# Patient Record
Sex: MALE | Race: WHITE | NOT HISPANIC OR LATINO | URBAN - METROPOLITAN AREA
[De-identification: names, ages, dates, MRNs, and addresses within clinical notes are randomized per-mention and may not be internally consistent; named-entity substitution may affect disease eponyms.]

---

## 2017-11-22 ENCOUNTER — INPATIENT (INPATIENT)
Facility: HOSPITAL | Age: 67
LOS: 5 days | Discharge: HOME | DRG: 683 | End: 2017-11-28
Attending: INTERNAL MEDICINE | Admitting: INTERNAL MEDICINE
Payer: MEDICARE

## 2017-11-22 VITALS
TEMPERATURE: 98 F | OXYGEN SATURATION: 99 % | DIASTOLIC BLOOD PRESSURE: 77 MMHG | RESPIRATION RATE: 18 BRPM | SYSTOLIC BLOOD PRESSURE: 113 MMHG | HEIGHT: 69 IN | HEART RATE: 76 BPM | WEIGHT: 149.91 LBS

## 2017-11-22 DIAGNOSIS — N28.9 DISORDER OF KIDNEY AND URETER, UNSPECIFIED: ICD-10-CM

## 2017-11-22 DIAGNOSIS — Z90.79 ACQUIRED ABSENCE OF OTHER GENITAL ORGAN(S): Chronic | ICD-10-CM

## 2017-11-22 LAB
ALBUMIN SERPL ELPH-MCNC: 3.4 G/DL — SIGNIFICANT CHANGE UP (ref 3.3–5.2)
ALP SERPL-CCNC: 86 U/L — SIGNIFICANT CHANGE UP (ref 40–120)
ALT FLD-CCNC: 18 U/L — SIGNIFICANT CHANGE UP
ANION GAP SERPL CALC-SCNC: 14 MMOL/L — SIGNIFICANT CHANGE UP (ref 5–17)
APPEARANCE UR: CLEAR — SIGNIFICANT CHANGE UP
APTT BLD: 27.6 SEC — SIGNIFICANT CHANGE UP (ref 27.5–37.4)
AST SERPL-CCNC: 14 U/L — SIGNIFICANT CHANGE UP
BACTERIA # UR AUTO: ABNORMAL
BASOPHILS # BLD AUTO: 0 K/UL — SIGNIFICANT CHANGE UP (ref 0–0.2)
BASOPHILS NFR BLD AUTO: 0.3 % — SIGNIFICANT CHANGE UP (ref 0–2)
BILIRUB SERPL-MCNC: 0.5 MG/DL — SIGNIFICANT CHANGE UP (ref 0.4–2)
BILIRUB UR-MCNC: NEGATIVE — SIGNIFICANT CHANGE UP
BUN SERPL-MCNC: 46 MG/DL — HIGH (ref 8–20)
CALCIUM SERPL-MCNC: 10.4 MG/DL — HIGH (ref 8.6–10.2)
CHLORIDE SERPL-SCNC: 113 MMOL/L — HIGH (ref 98–107)
CO2 SERPL-SCNC: 23 MMOL/L — SIGNIFICANT CHANGE UP (ref 22–29)
COLOR SPEC: YELLOW — SIGNIFICANT CHANGE UP
CREAT SERPL-MCNC: 2.8 MG/DL — HIGH (ref 0.5–1.3)
DIFF PNL FLD: ABNORMAL
EOSINOPHIL # BLD AUTO: 0.3 K/UL — SIGNIFICANT CHANGE UP (ref 0–0.5)
EOSINOPHIL NFR BLD AUTO: 4.9 % — SIGNIFICANT CHANGE UP (ref 0–5)
EPI CELLS # UR: SIGNIFICANT CHANGE UP
GLUCOSE SERPL-MCNC: 142 MG/DL — HIGH (ref 70–115)
GLUCOSE UR QL: 100 MG/DL
HCT VFR BLD CALC: 34.8 % — LOW (ref 42–52)
HGB BLD-MCNC: 11.3 G/DL — LOW (ref 14–18)
INR BLD: 1.09 RATIO — SIGNIFICANT CHANGE UP (ref 0.88–1.16)
KETONES UR-MCNC: NEGATIVE — SIGNIFICANT CHANGE UP
LEUKOCYTE ESTERASE UR-ACNC: ABNORMAL
LYMPHOCYTES # BLD AUTO: 1.4 K/UL — SIGNIFICANT CHANGE UP (ref 1–4.8)
LYMPHOCYTES # BLD AUTO: 20.4 % — SIGNIFICANT CHANGE UP (ref 20–55)
MAGNESIUM SERPL-MCNC: 2.3 MG/DL — SIGNIFICANT CHANGE UP (ref 1.6–2.6)
MCHC RBC-ENTMCNC: 30.8 PG — SIGNIFICANT CHANGE UP (ref 27–31)
MCHC RBC-ENTMCNC: 32.5 G/DL — SIGNIFICANT CHANGE UP (ref 32–36)
MCV RBC AUTO: 94.8 FL — HIGH (ref 80–94)
MONOCYTES # BLD AUTO: 0.5 K/UL — SIGNIFICANT CHANGE UP (ref 0–0.8)
MONOCYTES NFR BLD AUTO: 8 % — SIGNIFICANT CHANGE UP (ref 3–10)
NEUTROPHILS # BLD AUTO: 4.5 K/UL — SIGNIFICANT CHANGE UP (ref 1.8–8)
NEUTROPHILS NFR BLD AUTO: 65.8 % — SIGNIFICANT CHANGE UP (ref 37–73)
NITRITE UR-MCNC: POSITIVE
PH UR: 7 — SIGNIFICANT CHANGE UP (ref 5–8)
PHOSPHATE SERPL-MCNC: 3.4 MG/DL — SIGNIFICANT CHANGE UP (ref 2.4–4.7)
PLATELET # BLD AUTO: 135 K/UL — LOW (ref 150–400)
POTASSIUM SERPL-MCNC: 4.2 MMOL/L — SIGNIFICANT CHANGE UP (ref 3.5–5.3)
POTASSIUM SERPL-SCNC: 4.2 MMOL/L — SIGNIFICANT CHANGE UP (ref 3.5–5.3)
PROT SERPL-MCNC: 6 G/DL — LOW (ref 6.6–8.7)
PROT UR-MCNC: 30 MG/DL
PROTHROM AB SERPL-ACNC: 12 SEC — SIGNIFICANT CHANGE UP (ref 9.8–12.7)
RBC # BLD: 3.67 M/UL — LOW (ref 4.6–6.2)
RBC # FLD: 13.2 % — SIGNIFICANT CHANGE UP (ref 11–15.6)
RBC CASTS # UR COMP ASSIST: SIGNIFICANT CHANGE UP /HPF (ref 0–4)
SODIUM SERPL-SCNC: 150 MMOL/L — HIGH (ref 135–145)
SP GR SPEC: 1.01 — SIGNIFICANT CHANGE UP (ref 1.01–1.02)
UROBILINOGEN FLD QL: NEGATIVE MG/DL — SIGNIFICANT CHANGE UP
WBC # BLD: 6.8 K/UL — SIGNIFICANT CHANGE UP (ref 4.8–10.8)
WBC # FLD AUTO: 6.8 K/UL — SIGNIFICANT CHANGE UP (ref 4.8–10.8)
WBC UR QL: ABNORMAL

## 2017-11-22 PROCEDURE — 93010 ELECTROCARDIOGRAM REPORT: CPT

## 2017-11-22 PROCEDURE — 76775 US EXAM ABDO BACK WALL LIM: CPT | Mod: 26

## 2017-11-22 PROCEDURE — 99223 1ST HOSP IP/OBS HIGH 75: CPT

## 2017-11-22 PROCEDURE — 99285 EMERGENCY DEPT VISIT HI MDM: CPT

## 2017-11-22 RX ORDER — DEXTROSE 50 % IN WATER 50 %
25 SYRINGE (ML) INTRAVENOUS ONCE
Qty: 0 | Refills: 0 | Status: DISCONTINUED | OUTPATIENT
Start: 2017-11-22 | End: 2017-11-28

## 2017-11-22 RX ORDER — SODIUM CHLORIDE 9 MG/ML
1000 INJECTION INTRAMUSCULAR; INTRAVENOUS; SUBCUTANEOUS
Qty: 0 | Refills: 0 | Status: DISCONTINUED | OUTPATIENT
Start: 2017-11-22 | End: 2017-11-22

## 2017-11-22 RX ORDER — PANTOPRAZOLE SODIUM 20 MG/1
1 TABLET, DELAYED RELEASE ORAL
Qty: 0 | Refills: 0 | COMMUNITY

## 2017-11-22 RX ORDER — HEPARIN SODIUM 5000 [USP'U]/ML
5000 INJECTION INTRAVENOUS; SUBCUTANEOUS EVERY 8 HOURS
Qty: 0 | Refills: 0 | Status: DISCONTINUED | OUTPATIENT
Start: 2017-11-22 | End: 2017-11-28

## 2017-11-22 RX ORDER — QUETIAPINE FUMARATE 200 MG/1
25 TABLET, FILM COATED ORAL
Qty: 0 | Refills: 0 | Status: DISCONTINUED | OUTPATIENT
Start: 2017-11-22 | End: 2017-11-28

## 2017-11-22 RX ORDER — SODIUM CHLORIDE 9 MG/ML
1000 INJECTION, SOLUTION INTRAVENOUS
Qty: 0 | Refills: 0 | Status: DISCONTINUED | OUTPATIENT
Start: 2017-11-22 | End: 2017-11-28

## 2017-11-22 RX ORDER — QUETIAPINE FUMARATE 200 MG/1
100 TABLET, FILM COATED ORAL AT BEDTIME
Qty: 0 | Refills: 0 | Status: DISCONTINUED | OUTPATIENT
Start: 2017-11-22 | End: 2017-11-28

## 2017-11-22 RX ORDER — INSULIN LISPRO 100/ML
VIAL (ML) SUBCUTANEOUS
Qty: 0 | Refills: 0 | Status: DISCONTINUED | OUTPATIENT
Start: 2017-11-22 | End: 2017-11-28

## 2017-11-22 RX ORDER — SUCRALFATE 1 G
1 TABLET ORAL
Qty: 0 | Refills: 0 | Status: DISCONTINUED | OUTPATIENT
Start: 2017-11-22 | End: 2017-11-28

## 2017-11-22 RX ORDER — CINACALCET 30 MG/1
30 TABLET, FILM COATED ORAL DAILY
Qty: 0 | Refills: 0 | Status: DISCONTINUED | OUTPATIENT
Start: 2017-11-22 | End: 2017-11-28

## 2017-11-22 RX ORDER — PROPRANOLOL HCL 160 MG
20 CAPSULE, EXTENDED RELEASE 24HR ORAL
Qty: 0 | Refills: 0 | Status: DISCONTINUED | OUTPATIENT
Start: 2017-11-22 | End: 2017-11-28

## 2017-11-22 RX ORDER — LEVOTHYROXINE SODIUM 125 MCG
75 TABLET ORAL DAILY
Qty: 0 | Refills: 0 | Status: DISCONTINUED | OUTPATIENT
Start: 2017-11-22 | End: 2017-11-28

## 2017-11-22 RX ORDER — ATORVASTATIN CALCIUM 80 MG/1
1 TABLET, FILM COATED ORAL
Qty: 0 | Refills: 0 | COMMUNITY

## 2017-11-22 RX ORDER — DEXTROSE 50 % IN WATER 50 %
1 SYRINGE (ML) INTRAVENOUS ONCE
Qty: 0 | Refills: 0 | Status: DISCONTINUED | OUTPATIENT
Start: 2017-11-22 | End: 2017-11-28

## 2017-11-22 RX ORDER — GLUCAGON INJECTION, SOLUTION 0.5 MG/.1ML
1 INJECTION, SOLUTION SUBCUTANEOUS ONCE
Qty: 0 | Refills: 0 | Status: DISCONTINUED | OUTPATIENT
Start: 2017-11-22 | End: 2017-11-28

## 2017-11-22 RX ORDER — LEVOTHYROXINE SODIUM 125 MCG
1 TABLET ORAL
Qty: 0 | Refills: 0 | COMMUNITY

## 2017-11-22 RX ORDER — TRAZODONE HCL 50 MG
100 TABLET ORAL AT BEDTIME
Qty: 0 | Refills: 0 | Status: DISCONTINUED | OUTPATIENT
Start: 2017-11-22 | End: 2017-11-28

## 2017-11-22 RX ORDER — TAMSULOSIN HYDROCHLORIDE 0.4 MG/1
1 CAPSULE ORAL
Qty: 0 | Refills: 0 | COMMUNITY

## 2017-11-22 RX ORDER — ATORVASTATIN CALCIUM 80 MG/1
40 TABLET, FILM COATED ORAL AT BEDTIME
Qty: 0 | Refills: 0 | Status: DISCONTINUED | OUTPATIENT
Start: 2017-11-22 | End: 2017-11-28

## 2017-11-22 RX ORDER — SODIUM CHLORIDE 9 MG/ML
1000 INJECTION, SOLUTION INTRAVENOUS
Qty: 0 | Refills: 0 | Status: DISCONTINUED | OUTPATIENT
Start: 2017-11-22 | End: 2017-11-24

## 2017-11-22 RX ORDER — INSULIN GLARGINE 100 [IU]/ML
22 INJECTION, SOLUTION SUBCUTANEOUS AT BEDTIME
Qty: 0 | Refills: 0 | Status: DISCONTINUED | OUTPATIENT
Start: 2017-11-22 | End: 2017-11-28

## 2017-11-22 RX ORDER — PANTOPRAZOLE SODIUM 20 MG/1
40 TABLET, DELAYED RELEASE ORAL
Qty: 0 | Refills: 0 | Status: DISCONTINUED | OUTPATIENT
Start: 2017-11-22 | End: 2017-11-28

## 2017-11-22 RX ORDER — INSULIN GLARGINE 100 [IU]/ML
22 INJECTION, SOLUTION SUBCUTANEOUS
Qty: 0 | Refills: 0 | COMMUNITY

## 2017-11-22 RX ORDER — SUCRALFATE 1 G
1 TABLET ORAL
Qty: 0 | Refills: 0 | COMMUNITY

## 2017-11-22 RX ORDER — QUETIAPINE FUMARATE 200 MG/1
1 TABLET, FILM COATED ORAL
Qty: 0 | Refills: 0 | COMMUNITY

## 2017-11-22 RX ORDER — BENZTROPINE MESYLATE 1 MG
1 TABLET ORAL
Qty: 0 | Refills: 0 | Status: DISCONTINUED | OUTPATIENT
Start: 2017-11-22 | End: 2017-11-28

## 2017-11-22 RX ORDER — TAMSULOSIN HYDROCHLORIDE 0.4 MG/1
0.4 CAPSULE ORAL AT BEDTIME
Qty: 0 | Refills: 0 | Status: DISCONTINUED | OUTPATIENT
Start: 2017-11-22 | End: 2017-11-28

## 2017-11-22 RX ORDER — DEXTROSE 50 % IN WATER 50 %
12.5 SYRINGE (ML) INTRAVENOUS ONCE
Qty: 0 | Refills: 0 | Status: DISCONTINUED | OUTPATIENT
Start: 2017-11-22 | End: 2017-11-28

## 2017-11-22 RX ADMIN — Medication 75 MICROGRAM(S): at 22:10

## 2017-11-22 RX ADMIN — SODIUM CHLORIDE 85 MILLILITER(S): 9 INJECTION INTRAMUSCULAR; INTRAVENOUS; SUBCUTANEOUS at 15:02

## 2017-11-22 RX ADMIN — Medication 100 MILLIGRAM(S): at 22:10

## 2017-11-22 RX ADMIN — Medication 20 MILLIGRAM(S): at 22:10

## 2017-11-22 RX ADMIN — Medication 1 MILLIGRAM(S): at 22:10

## 2017-11-22 RX ADMIN — TAMSULOSIN HYDROCHLORIDE 0.4 MILLIGRAM(S): 0.4 CAPSULE ORAL at 22:10

## 2017-11-22 RX ADMIN — Medication 0.5 MILLIGRAM(S): at 23:01

## 2017-11-22 RX ADMIN — Medication 5 MILLIGRAM(S): at 22:10

## 2017-11-22 NOTE — ED PROVIDER NOTE - MUSCULOSKELETAL NEGATIVE STATEMENT, MLM
Unasyn, Vancomycin, Peridex, Versed no back pain, no gout, no musculoskeletal pain, no neck pain, and no weakness.

## 2017-11-22 NOTE — H&P ADULT - NSHPPHYSICALEXAM_GEN_ALL_CORE
ICU Vital Signs Last 24 Hrs  T(C): 36.6 (22 Nov 2017 12:06), Max: 36.8 (22 Nov 2017 11:09)  T(F): 97.9 (22 Nov 2017 12:06), Max: 98.2 (22 Nov 2017 11:09)  HR: 55 (22 Nov 2017 12:06) (55 - 76)  BP: 110/60 (22 Nov 2017 12:06) (110/60 - 113/77)  BP(mean): --  ABP: --  ABP(mean): --  RR: 16 (22 Nov 2017 12:06) (16 - 18)  SpO2: 95% (22 Nov 2017 12:06) (95% - 99%)

## 2017-11-22 NOTE — ED ADULT NURSE NOTE - OBJECTIVE STATEMENT
Patient poor historian. Patient daughter Naz at bedside [phone]. Daughter reports patient was hospitalized in [place] in september for dehydration, on lithium for bipolar causing renal impairment. Sent to Rehab in [place]. Medically flown back to [place]. follow up care at [facility] where they changed his psych meds to not metabolize in kidneys. Today she received call from [facility] for abnormal kidney function labs. patient has 16f gama catheter to leg bag in place.

## 2017-11-22 NOTE — ED PROVIDER NOTE - OBJECTIVE STATEMENT
67 year old male BIBA FROM A NURSING HOME WITH ABDNOMAL LABS.. Pt had recent blod tests and was told that his creatinine levels were going up. pt has a h/o kidney disease and has a chronic indwelling gama catheter . pt also has a h/o hyperparathyroidism and has elevated calcium levels 67 year old male BIBA FROM A NURSING HOME WITH ABDNOMAL LABS.. Pt had recent blood tests and was told that his creatinine levels were going up. pt has a h/o kidney disease and has a chronic indwelling gama catheter . pt also has a h/o hyperparathyroidism and has elevated calcium levels

## 2017-11-22 NOTE — H&P ADULT - ASSESSMENT
This is a 66 yo  man  with PMH of DM, HTN, psychiatric disease on Lithium for "years", prostate disease s/p surgery, chronic indwelling gama for "nonfunctioning bladder", parathyroid disease s/p resection and CRF who was following a nephrologist in [place] but was moved to [facility] in the recent past, admitted for worsening renal function. Pt report diarrhea coupe of days ago, loose stool, resolved now, also report poor oral intake. Labs here showed BUN 46, Cr2.80, Sodium 150, calcium 10.4    A/P    >Acute on CKD - unknown baseline creatinine   likely due to dehydration   admit to  medicine    start IVF, renal u/s  renal consult noted   f/u BMP  u/a     >Hypernatremia due to dehydration   c/w 0.45, f/u BMP    >Hypercalcemia - h/o parathyroid disease  c/w sensipar   f/u PTH  c/w IVF, f/u calcium     >GERD - PPI     >HTN - normotensive   will hold amlodipine for now   c/w propranolol     >Bipolar disorder and other psych comorbidities- c/w home medication     >DM - c/w lantus and sliding scale   A1c in am     >dyslipidemia - c/w statin     >DVT Ppx- heparin

## 2017-11-22 NOTE — CONSULT NOTE ADULT - ASSESSMENT
CRF(III): DM and hx Li use most likely etiologies  +chronic gama for (?) bladder dysfunction with no signs of obstruction on outpatient sono done 11/21/17.  ARF etiology unclear at this time==> r/o hypovolemia  Hypercalcemia with hx of parathyroid disease  - await admission bloodwork and repeat renal sonogram  - confirm and maintain patency of gama catheter  - IVF hydration  - check iPTH  - monitor labs

## 2017-11-22 NOTE — H&P ADULT - GASTROINTESTINAL DETAILS
Patient Demographics     Address Phone E-mail Address    Nereyda Senior  [phone] (Home)  [phone] (Mobile) *Preferred* [email or web address]      Emergency Contact(s)     Name Relation Home Work Mobile    Ty Krishnamurthy Spouse [phone] Commonly known as:  NORCO        Take 1 tablet by mouth every 4 (four) hours as needed.    Stop taking on:  3/16/2017    Denise Meneses                           Losartan Potassium 25 MG Tabs   Last time this was given:  25 mg on 3/2/2017  9:30 AM   Commonly [id number] HYDROcodone-acetaminophen (NORCO) 5-325 MG per tab 1 tablet 03/06/17 0540 Given      [id number] HYDROcodone-acetaminophen (NORCO) 5-325 MG per tab 1 tablet 03/06/17 1341 Given      [id number] STRESS FORMULA/ZINC (STRESS FORMULA) tab 1 tablet 03/06 GFR, -American >60 >=60   [facility]   Comment:           Chronic Kidney Disease: GFR <60 ml/min/1.73 m2  Kidney failure: GFR <15 ml/min/1.73 m2    The accuracy of the MDRD equation is not suitable for acute renal failure patients and it is not re Procedure                               Abnormality         Status                     ---------                               -----------         ------                     CBC W/ DIFFERENTIAL[[id number]]          Abnormal            Final result  1948 MRN [id number]   Location [facility] 1SW Attending Tania Denny MD   Hosp Day # 0 PCP Melissa Brand MD     Date of Admission:  3/1/2017    Date of Consult: 3/1/17    Chief Complaint:    Hip pain    History of Present Illness: Ears, nose, mouth, throat, and face: negative  Respiratory: negative  Cardiovascular: negative  Gastrointestinal: negative  Genitourinary:negative  Integument/breast: negative  Hematologic/lymphatic: negative  Musculoskeletal:negative  Neurological: negati Discussed with Dr. Malachi Dugan MD  3/1/2017  5:17 PM               Electronically signed by Elvis Richard MD on 3/1/2017  5:32 PM               Consults - MD Consult Notes      Consults signed by Carol Kirkland MD at 3/6/2017  3:52 PM      Au other surgery in the past.     MEDICATIONS:  Losartan 25 q.a.m. and metoprolol tartrate 50 b.i.d. She takes vitamin D 50,000 units weekly as well. ALLERGIES:  None known. SOCIAL HISTORY:  She is ;  present at the bedside.   Never smoked 2.   History of premature ventricular complexes, asymptomatic; none currently, neither are there any symptoms of ischemia, heart failure, or other significant arrhythmias. 3.   Hypertension, mildly elevated at this time.       Given the above clinical hist safety, pt took short sitting rest break between. Pt easily fatigue with standing activity and mobility. Pt was see BID today and slowly progressing toward set goals.       DISCHARGE RECOMMENDATIONS  PT Discharge Recommendations: Sub-acute rehabilitation Comment : pt with 2 person assist  sit to stand from EOB x 2  maintaining non WB status on left with therapist providing supporting for left LE   and other person assisting pt with stand   this repeated x 2  with  SPT  bed to chair  pivoting on right LE wi Closed left hip fracture, initial encounter [facility])  Active Problems:    Closed left hip fracture (HCC)       ASSESSMENT   AM session  Pt received supine in bed agreeable to mobility   Pain is well controlled    Pt with need for 2 person assist throughout ---decrease balance  Increased fall risk  Pt is non ambulatory at this time --pt able to maintain NON WB status on left with transfers and mobility as above  .   PT will continue to follow progressing as approp    D/c plans for rehab facility when pt is med -   Moving to and from a bed to a chair (including a wheelchair)?: Total   -   Need to walk in hospital room?: Total   -   Climbing 3-5 steps with a railing?: Total    AM-PAC Score:  Raw Score: 6   PT Approx Degree of Impairment Score: 100%   Standardized Goal #2    Patient is able to demonstrate transfers Sit to/from Stand at assistance level: moderate assistance    Current:  Mod Assist x 2   Assist for left LE to maintain NON WB     Goal #3    The pt will be able to perform her supine there ex with SBA to improve strength necessary to progress to ambulation. Pt unwilling to perform static stand this session, stating R LE weakness. Pt lying comfortably in bed at end of session with all needs met and  present.      PM session: TERESITA Alvares approved partici -   Turning over in bed (including adjusting bedclothes, sheets and blankets)?: A Little   -   Sitting down on and standing up from a chair with arms (e.g., wheelchair, bedside commode, etc.): A Lot   -   Moving from lying on back to sitting on the side of Occupational Therapy Notes (last 72 hours) (Notes from 3/3/2017  6:08 PM through 3/6/2017  6:08 PM)     No notes of this type exist for this encounter. Video Swallow Study Notes     No notes of this type exist for this encounter.       SLP Notes     No soft/normal/no distention/nontender

## 2017-11-22 NOTE — ED ADULT TRIAGE NOTE - CHIEF COMPLAINT QUOTE
pt alert and awake x3, BIBA from [facility] for abnormal labs of high creatinine pt alert and awake x3, BIBA from [facility] for abnormal labs of high creatinine, arrived with natan from NH

## 2017-11-22 NOTE — ED ADULT NURSE NOTE - CHIEF COMPLAINT QUOTE
pt alert and awake x3, BIBA from [facility] for abnormal labs of high creatinine, arrived with natan from NH

## 2017-11-22 NOTE — CONSULT NOTE ADULT - SUBJECTIVE AND OBJECTIVE BOX
Patient is a 67y old  Male who presents with ARF aon outpatient labwork    HPI: The pt is a 66 yo W male (pt cannot give a coherent history- family at bedside) PMH DM, HTN, psychiatric disease on Li for "years", prostate disease s/p surgery, chronic indwelling gama for "nonfunctioning bladder", parathyroid disease s/p resection and CRF who was following a nephrologist in [place] but was moved to [facility] in the recent past.  Now found to have worsening renal function on outpatient labs and sent to the ER for evaluation.  Outpatient labs show Screat=2.1 mg/dl (? baseline) earlier this month which has now risen to 3.2mg/dl (11/21).  Outpatient sonogram (11/21) showed small kidneys but no hydronephroses.    ROS: No fever, chills, HA, n/v/d, cp, sob, abd pain, cough, rash, sore throat, myalgias nor arthralgias.      PAST MEDICAL & SURGICAL HISTORY:  see above      FAMILY HISTORY:  no history of renal disease noted      Social History:  no tobacco; no etoh nor drugs    MEDICATIONS  (STANDING):  Outpatient meds reviewed through NH records  No overt nephrotoxic agents noted    MEDICATIONS  (PRN):      Allergies    No Known Allergies      Vital Signs Last 24 Hrs  T(C): 36.6 (22 Nov 2017 12:06), Max: 36.8 (22 Nov 2017 11:09)  T(F): 97.9 (22 Nov 2017 12:06), Max: 98.2 (22 Nov 2017 11:09)  HR: 55 (22 Nov 2017 12:06) (55 - 76)  BP: 110/60 (22 Nov 2017 12:06) (110/60 - 113/77)  BP(mean): --  RR: 16 (22 Nov 2017 12:06) (16 - 18)  SpO2: 95% (22 Nov 2017 12:06) (95% - 99%)    PHYSICAL EXAM:    GENERAL: NAD  HEAD:  Atraumatic, Normocephalic  EYES: EOMI, PERRLA, conjunctiva and sclera clear  NECK: Supple, No JVD, Normal thyroid  NERVOUS SYSTEM:  Alert & Oriented; intact and symmetric  CHEST/LUNG: Clear to percussion bilaterally  HEART: Regular rate and rhythm; No rub  ABDOMEN: Soft, Nontender, Nondistended; Bowel sounds present  EXTREMITIES:  2+ Peripheral Pulses, No edema  SKIN: No rashes or lesions      LABS:  ER labs pending                RADIOLOGY & ADDITIONAL TESTS:

## 2017-11-22 NOTE — H&P ADULT - HISTORY OF PRESENT ILLNESS
This is a 66 yo  man  with PMH of DM, HTN, psychiatric disease on Lithium for "years", prostate disease s/p surgery, chronic indwelling gama for "nonfunctioning bladder", parathyroid disease s/p resection and CRF who was following a nephrologist in [place] but was moved to [facility] in the recent past.  Now found to have worsening renal function, [place] [facility]  [place]ed Screat=2.1 mg/dl (? baseline) earlier this month which has now risen to 3.2mg/dl (11/21).  Outpatient sonogram (11/21) showed small kidneys but no hydronephroses. Pt report diarrhea coupe of days ago, loose stool, resolved now, also report poor oral intake.    Labs here showed BUN 46, Cr2.80, Sodium 150, calcium 10.4    Denied fever, chills, nausea, vomiting, chest pain, SOB, urinary complaints

## 2017-11-23 LAB
ANION GAP SERPL CALC-SCNC: 12 MMOL/L — SIGNIFICANT CHANGE UP (ref 5–17)
BUN SERPL-MCNC: 36 MG/DL — HIGH (ref 8–20)
CALCIUM SERPL-MCNC: 10.1 MG/DL — SIGNIFICANT CHANGE UP (ref 8.6–10.2)
CHLORIDE SERPL-SCNC: 117 MMOL/L — HIGH (ref 98–107)
CHOLEST SERPL-MCNC: 123 MG/DL — SIGNIFICANT CHANGE UP (ref 110–199)
CO2 SERPL-SCNC: 22 MMOL/L — SIGNIFICANT CHANGE UP (ref 22–29)
CREAT SERPL-MCNC: 2.3 MG/DL — HIGH (ref 0.5–1.3)
GLUCOSE BLDC GLUCOMTR-MCNC: 101 MG/DL — HIGH (ref 70–99)
GLUCOSE BLDC GLUCOMTR-MCNC: 120 MG/DL — HIGH (ref 70–99)
GLUCOSE BLDC GLUCOMTR-MCNC: 126 MG/DL — HIGH (ref 70–99)
GLUCOSE BLDC GLUCOMTR-MCNC: 223 MG/DL — HIGH (ref 70–99)
GLUCOSE SERPL-MCNC: 129 MG/DL — HIGH (ref 70–115)
HBA1C BLD-MCNC: 5.6 % — SIGNIFICANT CHANGE UP (ref 4–5.6)
HCT VFR BLD CALC: 33.4 % — LOW (ref 42–52)
HDLC SERPL-MCNC: 37 MG/DL — LOW
HGB BLD-MCNC: 10.6 G/DL — LOW (ref 14–18)
LIPID PNL WITH DIRECT LDL SERPL: 61 MG/DL — SIGNIFICANT CHANGE UP
MCHC RBC-ENTMCNC: 30.1 PG — SIGNIFICANT CHANGE UP (ref 27–31)
MCHC RBC-ENTMCNC: 31.7 G/DL — LOW (ref 32–36)
MCV RBC AUTO: 94.9 FL — HIGH (ref 80–94)
PLATELET # BLD AUTO: 145 K/UL — LOW (ref 150–400)
POTASSIUM SERPL-MCNC: 3.8 MMOL/L — SIGNIFICANT CHANGE UP (ref 3.5–5.3)
POTASSIUM SERPL-SCNC: 3.8 MMOL/L — SIGNIFICANT CHANGE UP (ref 3.5–5.3)
PTH-INTACT FLD-MCNC: 62 PG/ML — SIGNIFICANT CHANGE UP (ref 15–65)
RBC # BLD: 3.52 M/UL — LOW (ref 4.6–6.2)
RBC # FLD: 13 % — SIGNIFICANT CHANGE UP (ref 11–15.6)
SODIUM SERPL-SCNC: 151 MMOL/L — HIGH (ref 135–145)
TOTAL CHOLESTEROL/HDL RATIO MEASUREMENT: 3 RATIO — LOW (ref 3.4–9.6)
TRIGL SERPL-MCNC: 123 MG/DL — SIGNIFICANT CHANGE UP (ref 10–200)
TSH SERPL-MCNC: 0.74 UIU/ML — SIGNIFICANT CHANGE UP (ref 0.27–4.2)
WBC # BLD: 6.4 K/UL — SIGNIFICANT CHANGE UP (ref 4.8–10.8)
WBC # FLD AUTO: 6.4 K/UL — SIGNIFICANT CHANGE UP (ref 4.8–10.8)

## 2017-11-23 PROCEDURE — 99232 SBSQ HOSP IP/OBS MODERATE 35: CPT

## 2017-11-23 RX ORDER — HALOPERIDOL DECANOATE 100 MG/ML
0.5 INJECTION INTRAMUSCULAR EVERY 8 HOURS
Qty: 0 | Refills: 0 | Status: DISCONTINUED | OUTPATIENT
Start: 2017-11-23 | End: 2017-11-28

## 2017-11-23 RX ORDER — CEFTRIAXONE 500 MG/1
1 INJECTION, POWDER, FOR SOLUTION INTRAMUSCULAR; INTRAVENOUS ONCE
Qty: 0 | Refills: 0 | Status: COMPLETED | OUTPATIENT
Start: 2017-11-23 | End: 2017-11-23

## 2017-11-23 RX ORDER — CEFTRIAXONE 500 MG/1
1 INJECTION, POWDER, FOR SOLUTION INTRAMUSCULAR; INTRAVENOUS EVERY 24 HOURS
Qty: 0 | Refills: 0 | Status: DISCONTINUED | OUTPATIENT
Start: 2017-11-24 | End: 2017-11-24

## 2017-11-23 RX ORDER — CEFTRIAXONE 500 MG/1
INJECTION, POWDER, FOR SOLUTION INTRAMUSCULAR; INTRAVENOUS
Qty: 0 | Refills: 0 | Status: DISCONTINUED | OUTPATIENT
Start: 2017-11-23 | End: 2017-11-24

## 2017-11-23 RX ADMIN — HEPARIN SODIUM 5000 UNIT(S): 5000 INJECTION INTRAVENOUS; SUBCUTANEOUS at 14:50

## 2017-11-23 RX ADMIN — Medication 5 MILLIGRAM(S): at 08:57

## 2017-11-23 RX ADMIN — Medication 5 MILLIGRAM(S): at 22:24

## 2017-11-23 RX ADMIN — QUETIAPINE FUMARATE 25 MILLIGRAM(S): 200 TABLET, FILM COATED ORAL at 08:58

## 2017-11-23 RX ADMIN — TAMSULOSIN HYDROCHLORIDE 0.4 MILLIGRAM(S): 0.4 CAPSULE ORAL at 22:24

## 2017-11-23 RX ADMIN — Medication 4: at 12:14

## 2017-11-23 RX ADMIN — HEPARIN SODIUM 5000 UNIT(S): 5000 INJECTION INTRAVENOUS; SUBCUTANEOUS at 22:30

## 2017-11-23 RX ADMIN — Medication 1 MILLIGRAM(S): at 17:44

## 2017-11-23 RX ADMIN — PANTOPRAZOLE SODIUM 40 MILLIGRAM(S): 20 TABLET, DELAYED RELEASE ORAL at 08:58

## 2017-11-23 RX ADMIN — SODIUM CHLORIDE 100 MILLILITER(S): 9 INJECTION, SOLUTION INTRAVENOUS at 00:02

## 2017-11-23 RX ADMIN — Medication 1 GRAM(S): at 12:15

## 2017-11-23 RX ADMIN — ATORVASTATIN CALCIUM 40 MILLIGRAM(S): 80 TABLET, FILM COATED ORAL at 22:24

## 2017-11-23 RX ADMIN — Medication 100 MILLIGRAM(S): at 22:24

## 2017-11-23 RX ADMIN — CEFTRIAXONE 100 GRAM(S): 500 INJECTION, POWDER, FOR SOLUTION INTRAMUSCULAR; INTRAVENOUS at 12:16

## 2017-11-23 RX ADMIN — Medication 1 GRAM(S): at 22:24

## 2017-11-23 RX ADMIN — INSULIN GLARGINE 22 UNIT(S): 100 INJECTION, SOLUTION SUBCUTANEOUS at 22:24

## 2017-11-23 RX ADMIN — Medication 20 MILLIGRAM(S): at 22:24

## 2017-11-23 RX ADMIN — CINACALCET 30 MILLIGRAM(S): 30 TABLET, FILM COATED ORAL at 12:15

## 2017-11-23 RX ADMIN — Medication 75 MICROGRAM(S): at 08:57

## 2017-11-23 RX ADMIN — QUETIAPINE FUMARATE 100 MILLIGRAM(S): 200 TABLET, FILM COATED ORAL at 22:24

## 2017-11-23 RX ADMIN — SODIUM CHLORIDE 100 MILLILITER(S): 9 INJECTION, SOLUTION INTRAVENOUS at 16:11

## 2017-11-23 RX ADMIN — Medication 5 MILLIGRAM(S): at 14:50

## 2017-11-23 RX ADMIN — QUETIAPINE FUMARATE 25 MILLIGRAM(S): 200 TABLET, FILM COATED ORAL at 12:16

## 2017-11-23 RX ADMIN — Medication 1 GRAM(S): at 08:57

## 2017-11-23 NOTE — PROGRESS NOTE ADULT - ASSESSMENT
This is a 66 yo  man  with PMH of DM, HTN, psychiatric disease on Lithium for "years", prostate disease s/p surgery, chronic indwelling gama for "nonfunctioning bladder", parathyroid disease s/p resection and CRF who was following a nephrologist in [place] but was moved to [facility] in the recent past, admitted for worsening renal function. Pt report diarrhea coupe of days ago, loose stool, resolved now, also report poor oral intake. Labs here showed BUN 46, Cr2.80, Sodium 150, calcium 10.4    A/P    >Acute on CKD - unknown baseline creatinine   likely due to dehydration   continue IVF, renal u/s  Nephrology consulted, Appreciate recommendations  f/u BMP    Hypernatremia due to dehydration   c/w 0.45, f/u BMP    Hypercalcemia - h/o parathyroid disease  c/w sensipar   c/w IVF, f/u calcium     GERD - PPI     Essential HTN - normotensive   will hold amlodipine for now   c/w propranolol     >Bipolar disorder and other psych comorbidities- c/w home medication     Type 2 DM with hyperglycemia :  c/w lantus and sliding scale   A1c    >DVT Ppx- heparin This is a 66 yo  man  with PMH of DM, HTN, psychiatric disease on Lithium for "years", prostate disease s/p surgery, chronic indwelling gama for "nonfunctioning bladder", parathyroid disease s/p resection and CRF who was following a nephrologist in [place] but was moved to [facility] in the recent past, admitted for worsening renal function. Pt report diarrhea coupe of days ago, loose stool, resolved now, also report poor oral intake. Labs here showed BUN 46, Cr2.80, Sodium 150, calcium 10.4    A/P    >Acute on CKD - unknown baseline creatinine   likely due to dehydration   continue IVF,   Renal USG : no hydronephrosis  Nephrology consulted, Appreciate recommendations    Hypernatremia due to dehydration   c/w 0.45, f/u BMP    Hypercalcemia - h/o parathyroid disease  c/w Sensipar   c/w IVF, f/u calcium     GERD - PPI     Essential HTN - normotensive   continue home medicationsl     >Bipolar disorder and other psych comorbidities- c/w home medication     Type 2 DM with hyperglycemia :  c/w lantus and sliding scale   A1c    >DVT Ppx- heparin

## 2017-11-23 NOTE — PROGRESS NOTE ADULT - SUBJECTIVE AND OBJECTIVE BOX
MASOUD GIAVIVEK  ----------------------------------------    CC: worsening renal functions    The patient is in no acute distress.  Denied any chest pain, palpitations, dyspnea, or abdominal pain.    Vital Signs Last 24 Hrs  T(C): 37.1 (2017 01:00), Max: 37.1 (2017 01:00)  T(F): 98.7 (:00), Max: 98.7 (:00)  HR: 53 (:) (53 - 76)  BP: 148/76 (:) (110/60 - 148/76)  BP(mean): --  RR: 18 (:) (16 - 18)  SpO2: 97% (:) (95% - 99%)    CAPILLARY BLOOD GLUCOSE        PHYSICAL EXAMINATION:  ----------------------------------------      LABORATORY STUDIES:  ----------------------------------------                        11.3   6.8   )-----------( 135      ( 2017 14:28 )             34.8     -    150<H>  |  113<H>  |  46.0<H>  ----------------------------<  142<H>  4.2   |  23.0  |  2.80<H>    Ca    10.4<H>      2017 15:09  Phos  3.4     -22  Mg     2.3         TPro  6.0<L>  /  Alb  3.4  /  TBili  0.5  /  DBili  x   /  AST  14  /  ALT  18  /  AlkPhos  86  11-22    LIVER FUNCTIONS - ( 2017 15:09 )  Alb: 3.4 g/dL / Pro: 6.0 g/dL / ALK PHOS: 86 U/L / ALT: 18 U/L / AST: 14 U/L / GGT: x           PT/INR - ( 2017 14:28 )   PT: 12.0 sec;   INR: 1.09 ratio         PTT - ( 2017 14:28 )  PTT:27.6 sec          Urinalysis Basic - ( 2017 20:39 )    Color: Yellow / Appearance: Clear / S.010 / pH: x  Gluc: x / Ketone: Negative  / Bili: Negative / Urobili: Negative mg/dL   Blood: x / Protein: 30 mg/dL / Nitrite: Positive   Leuk Esterase: Moderate / RBC: 0-2 /HPF / WBC 26-50   Sq Epi: x / Non Sq Epi: Occasional / Bacteria: Moderate          MEDICATIONS  (STANDING):  atorvastatin 40 milliGRAM(s) Oral at bedtime  benztropine 1 milliGRAM(s) Oral two times a day  busPIRone 5 milliGRAM(s) Oral three times a day  cinacalcet 30 milliGRAM(s) Oral daily  dextrose 5%. 1000 milliLiter(s) (50 mL/Hr) IV Continuous <Continuous>  dextrose 50% Injectable 12.5 Gram(s) IV Push once  dextrose 50% Injectable 25 Gram(s) IV Push once  dextrose 50% Injectable 25 Gram(s) IV Push once  heparin  Injectable 5000 Unit(s) SubCutaneous every 8 hours  insulin glargine Injectable (LANTUS) 22 Unit(s) SubCutaneous at bedtime  insulin lispro (HumaLOG) corrective regimen sliding scale   SubCutaneous three times a day before meals  levothyroxine 75 MICROGram(s) Oral daily  pantoprazole    Tablet 40 milliGRAM(s) Oral before breakfast  propranolol 20 milliGRAM(s) Oral two times a day  QUEtiapine 100 milliGRAM(s) Oral at bedtime  QUEtiapine 25 milliGRAM(s) Oral <User Schedule>  sodium chloride 0.45%. 1000 milliLiter(s) (100 mL/Hr) IV Continuous <Continuous>  sucralfate 1 Gram(s) Oral Before meals and at bedtime  tamsulosin 0.4 milliGRAM(s) Oral at bedtime  traZODone 100 milliGRAM(s) Oral at bedtime    MEDICATIONS  (PRN):  dextrose Gel 1 Dose(s) Oral once PRN Blood Glucose LESS THAN 70 milliGRAM(s)/deciliter  glucagon  Injectable 1 milliGRAM(s) IntraMuscular once PRN Glucose LESS THAN 70 milligrams/deciliter      ASSESSMENT / PLAN:  ---------------------------------------- MASOUD GIAVIVEK  ----------------------------------------    CC: worsening renal functions    agitated last night requiring Vests ad\nd Ativan  renal functions improvin  Denied any chest pain, palpitations, dyspnea, or abdominal pain.    Vital Signs Last 24 Hrs  T(C): 37.1 (2017 01:00), Max: 37.1 (2017 01:00)  T(F): 98.7 (2017 01:00), Max: 98.7 (:00)  HR: 53 (:) (53 - 76)  BP: 148/76 (:) (110/60 - 148/76)  BP(mean): --  RR: 18 (:) (16 - 18)  SpO2: 97% (:) (95% - 99%)    CAPILLARY BLOOD GLUCOSE        PHYSICAL EXAMINATION:  ----------------------------------------      LABORATORY STUDIES:  ----------------------------------------                        11.3   6.8   )-----------( 135      ( 2017 14:28 )             34.8     11-22    150<H>  |  113<H>  |  46.0<H>  ----------------------------<  142<H>  4.2   |  23.0  |  2.80<H>    Ca    10.4<H>      2017 15:09  Phos  3.4     11-22  Mg     2.3         TPro  6.0<L>  /  Alb  3.4  /  TBili  0.5  /  DBili  x   /  AST  14  /  ALT  18  /  AlkPhos  86  11-22    LIVER FUNCTIONS - ( 2017 15:09 )  Alb: 3.4 g/dL / Pro: 6.0 g/dL / ALK PHOS: 86 U/L / ALT: 18 U/L / AST: 14 U/L / GGT: x           PT/INR - ( 2017 14:28 )   PT: 12.0 sec;   INR: 1.09 ratio         PTT - ( 2017 14:28 )  PTT:27.6 sec          Urinalysis Basic - ( 2017 20:39 )    Color: Yellow / Appearance: Clear / S.010 / pH: x  Gluc: x / Ketone: Negative  / Bili: Negative / Urobili: Negative mg/dL   Blood: x / Protein: 30 mg/dL / Nitrite: Positive   Leuk Esterase: Moderate / RBC: 0-2 /HPF / WBC 26-50   Sq Epi: x / Non Sq Epi: Occasional / Bacteria: Moderate          MEDICATIONS  (STANDING):  atorvastatin 40 milliGRAM(s) Oral at bedtime  benztropine 1 milliGRAM(s) Oral two times a day  busPIRone 5 milliGRAM(s) Oral three times a day  cinacalcet 30 milliGRAM(s) Oral daily  dextrose 5%. 1000 milliLiter(s) (50 mL/Hr) IV Continuous <Continuous>  dextrose 50% Injectable 12.5 Gram(s) IV Push once  dextrose 50% Injectable 25 Gram(s) IV Push once  dextrose 50% Injectable 25 Gram(s) IV Push once  heparin  Injectable 5000 Unit(s) SubCutaneous every 8 hours  insulin glargine Injectable (LANTUS) 22 Unit(s) SubCutaneous at bedtime  insulin lispro (HumaLOG) corrective regimen sliding scale   SubCutaneous three times a day before meals  levothyroxine 75 MICROGram(s) Oral daily  pantoprazole    Tablet 40 milliGRAM(s) Oral before breakfast  propranolol 20 milliGRAM(s) Oral two times a day  QUEtiapine 100 milliGRAM(s) Oral at bedtime  QUEtiapine 25 milliGRAM(s) Oral <User Schedule>  sodium chloride 0.45%. 1000 milliLiter(s) (100 mL/Hr) IV Continuous <Continuous>  sucralfate 1 Gram(s) Oral Before meals and at bedtime  tamsulosin 0.4 milliGRAM(s) Oral at bedtime  traZODone 100 milliGRAM(s) Oral at bedtime    MEDICATIONS  (PRN):  dextrose Gel 1 Dose(s) Oral once PRN Blood Glucose LESS THAN 70 milliGRAM(s)/deciliter  glucagon  Injectable 1 milliGRAM(s) IntraMuscular once PRN Glucose LESS THAN 70 milligrams/deciliter      ASSESSMENT / PLAN:  ---------------------------------------- MASOUD ELLIS  ----------------------------------------    CC: worsening renal functions    agitated last night requiring Vests ad\nd Ativan  renal functions improving  Denied any chest pain, palpitations, dyspnea, or abdominal pain.    Vital Signs Last 24 Hrs  T(C): 37.1 (:00), Max: 37.1 (:00)  T(F): 98.7 (2017 01:00), Max: 98.7 (:00)  HR: 53 (:) (53 - 76)  BP: 148/76 (:) (110/60 - 148/76)  BP(mean): --  RR: 18 (:) (16 - 18)  SpO2: 97% (:) (95% - 99%)    CAPILLARY BLOOD GLUCOSE        PHYSICAL EXAMINATION:  ----------------------------------------    General appearance: NAD, Awake, Alert  HEENT: NCAT, Conjunctiva clear, EOMI  + vest  Neck: Supple, No JVD  Lungs: Clear to auscultation, Breath sound equal bilaterally  Cardiovascular: S1S2, Regular rhythm  Abdomen: Soft, Nontender, Positive bowel sounds  Extremities: No clubbing, No cyanosis, No edema  + gama  CNS: alert and oriented times 2    LABORATORY STUDIES:  ----------------------------------------                        11.3   6.8   )-----------( 135      ( 2017 14:28 )             34.8     11-22    150<H>  |  113<H>  |  46.0<H>  ----------------------------<  142<H>  4.2   |  23.0  |  2.80<H>    Ca    10.4<H>      2017 15:09  Phos  3.4     11-22  Mg     2.3         TPro  6.0<L>  /  Alb  3.4  /  TBili  0.5  /  DBili  x   /  AST  14  /  ALT  18  /  AlkPhos  86      LIVER FUNCTIONS - ( 2017 15:09 )  Alb: 3.4 g/dL / Pro: 6.0 g/dL / ALK PHOS: 86 U/L / ALT: 18 U/L / AST: 14 U/L / GGT: x           PT/INR - ( 2017 14:28 )   PT: 12.0 sec;   INR: 1.09 ratio         PTT - ( 2017 14:28 )  PTT:27.6 sec      Urinalysis Basic - ( 2017 20:39 )    Color: Yellow / Appearance: Clear / S.010 / pH: x  Gluc: x / Ketone: Negative  / Bili: Negative / Urobili: Negative mg/dL   Blood: x / Protein: 30 mg/dL / Nitrite: Positive   Leuk Esterase: Moderate / RBC: 0-2 /HPF / WBC 26-50   Sq Epi: x / Non Sq Epi: Occasional / Bacteria: Moderate          MEDICATIONS  (STANDING):  atorvastatin 40 milliGRAM(s) Oral at bedtime  benztropine 1 milliGRAM(s) Oral two times a day  busPIRone 5 milliGRAM(s) Oral three times a day  cinacalcet 30 milliGRAM(s) Oral daily  dextrose 5%. 1000 milliLiter(s) (50 mL/Hr) IV Continuous <Continuous>  dextrose 50% Injectable 12.5 Gram(s) IV Push once  dextrose 50% Injectable 25 Gram(s) IV Push once  dextrose 50% Injectable 25 Gram(s) IV Push once  heparin  Injectable 5000 Unit(s) SubCutaneous every 8 hours  insulin glargine Injectable (LANTUS) 22 Unit(s) SubCutaneous at bedtime  insulin lispro (HumaLOG) corrective regimen sliding scale   SubCutaneous three times a day before meals  levothyroxine 75 MICROGram(s) Oral daily  pantoprazole    Tablet 40 milliGRAM(s) Oral before breakfast  propranolol 20 milliGRAM(s) Oral two times a day  QUEtiapine 100 milliGRAM(s) Oral at bedtime  QUEtiapine 25 milliGRAM(s) Oral <User Schedule>  sodium chloride 0.45%. 1000 milliLiter(s) (100 mL/Hr) IV Continuous <Continuous>  sucralfate 1 Gram(s) Oral Before meals and at bedtime  tamsulosin 0.4 milliGRAM(s) Oral at bedtime  traZODone 100 milliGRAM(s) Oral at bedtime    MEDICATIONS  (PRN):  dextrose Gel 1 Dose(s) Oral once PRN Blood Glucose LESS THAN 70 milliGRAM(s)/deciliter  glucagon  Injectable 1 milliGRAM(s) IntraMuscular once PRN Glucose LESS THAN 70 milligrams/deciliter      ASSESSMENT / PLAN:  ----------------------------------------

## 2017-11-23 NOTE — PROGRESS NOTE ADULT - SUBJECTIVE AND OBJECTIVE BOX
NEPHROLOGY INTERVAL HPI/OVERNIGHT EVENTS:    No new events   IVF noted   No new complaints     MEDICATIONS  (STANDING):  atorvastatin 40 milliGRAM(s) Oral at bedtime  benztropine 1 milliGRAM(s) Oral two times a day  busPIRone 5 milliGRAM(s) Oral three times a day  cinacalcet 30 milliGRAM(s) Oral daily  dextrose 5%. 1000 milliLiter(s) (50 mL/Hr) IV Continuous <Continuous>  dextrose 50% Injectable 12.5 Gram(s) IV Push once  dextrose 50% Injectable 25 Gram(s) IV Push once  dextrose 50% Injectable 25 Gram(s) IV Push once  heparin  Injectable 5000 Unit(s) SubCutaneous every 8 hours  insulin glargine Injectable (LANTUS) 22 Unit(s) SubCutaneous at bedtime  insulin lispro (HumaLOG) corrective regimen sliding scale   SubCutaneous three times a day before meals  levothyroxine 75 MICROGram(s) Oral daily  pantoprazole    Tablet 40 milliGRAM(s) Oral before breakfast  propranolol 20 milliGRAM(s) Oral two times a day  QUEtiapine 100 milliGRAM(s) Oral at bedtime  QUEtiapine 25 milliGRAM(s) Oral <User Schedule>  sodium chloride 0.45%. 1000 milliLiter(s) (100 mL/Hr) IV Continuous <Continuous>  sucralfate 1 Gram(s) Oral Before meals and at bedtime  tamsulosin 0.4 milliGRAM(s) Oral at bedtime  traZODone 100 milliGRAM(s) Oral at bedtime    MEDICATIONS  (PRN):  dextrose Gel 1 Dose(s) Oral once PRN Blood Glucose LESS THAN 70 milliGRAM(s)/deciliter  glucagon  Injectable 1 milliGRAM(s) IntraMuscular once PRN Glucose LESS THAN 70 milligrams/deciliter      Allergies    No Known Allergies    Intolerances        Vital Signs Last 24 Hrs  T(C): 36.9 (2017 08:10), Max: 37.1 (2017 01:00)  T(F): 98.5 (2017 08:10), Max: 98.7 (2017 01:00)  HR: 56 (2017 08:10) (53 - 56)  BP: 148/71 (2017 08:10) (110/60 - 148/76)  BP(mean): --  RR: 14 (2017 08:10) (14 - 18)  SpO2: 98% (2017 08:10) (95% - 98%)  Daily     Daily   I&O's Detail    2017 07:  -  2017 07:00  --------------------------------------------------------  IN:    sodium chloride 0.9%: 999 mL  Total IN: 999 mL    OUT:    Voided: 900 mL  Total OUT: 900 mL    Total NET: 99 mL      2017 07:  -  2017 11:29  --------------------------------------------------------  IN:  Total IN: 0 mL    OUT:    Indwelling Catheter - Urethral: 2500 mL  Total OUT: 2500 mL    Total NET: -2500 mL        I&O's Summary    2017 07:  -  2017 07:00  --------------------------------------------------------  IN: 999 mL / OUT: 900 mL / NET: 99 mL    2017 07:  -  2017 11:29  --------------------------------------------------------  IN: 0 mL / OUT: 2500 mL / NET: -2500 mL        PHYSICAL EXAM:  HEAD:  Atraumatic, Normocephalic  EYES: EOMI, PERRLA, conjunctiva and sclera clear  NECK: Supple, No JVD, Normal thyroid  NERVOUS SYSTEM:  Alert & Oriented; intact and symmetric  CHEST/LUNG: Clear to percussion bilaterally  HEART: Regular rate and rhythm; No rub  ABDOMEN: Soft, Nontender, Nondistended; Bowel sounds present  EXTREMITIES:  2+ Peripheral Pulses, No edema    LABS:                        10.6   6.4   )-----------( 145      ( 2017 08:11 )             33.4         151<H>  |  117<H>  |  36.0<H>  ----------------------------<  129<H>  3.8   |  22.0  |  2.30<H>    Ca    10.1      2017 08:11  Phos  3.4       Mg     2.3         TPro  6.0<L>  /  Alb  3.4  /  TBili  0.5  /  DBili  x   /  AST  14  /  ALT  18  /  AlkPhos  86      PT/INR - ( 2017 14:28 )   PT: 12.0 sec;   INR: 1.09 ratio         PTT - ( 2017 14:28 )  PTT:27.6 sec  Urinalysis Basic - ( 2017 20:39 )    Color: Yellow / Appearance: Clear / S.010 / pH: x  Gluc: x / Ketone: Negative  / Bili: Negative / Urobili: Negative mg/dL   Blood: x / Protein: 30 mg/dL / Nitrite: Positive   Leuk Esterase: Moderate / RBC: 0-2 /HPF / WBC 26-50   Sq Epi: x / Non Sq Epi: Occasional / Bacteria: Moderate      Phosphorus Level, Serum: 3.4 mg/dL ( @ 15:09)  Magnesium, Serum: 2.3 mg/dL ( @ 15:09)         EXAM:  US KIDNEY(S)                          PROCEDURE DATE:  2017          INTERPRETATION:  CLINICAL HISTORY: Renal insufficiency.    TECHNIQUE: Renal Sonography.     COMPARISON: None    FINDINGS: The right kidney displays increased cortical echogenicity   consistent with chronic medical renal disease. The right kidney measures   6.88 cm in longitudinal dimension. No right hydronephrosis, mass or   calculi is visualized. Medial midpole cyst measures a 1.6 cm. Upper pole   cyst measures 1.3 cm.    The left kidney displays increased cortical echogenicity consistent with   chronic medical renal disease. The left kidney measures 9.8 cm in   longitudinal dimension. No right hydronephrosis, mass or calculi is   visualized. Multiple subcentimeter cysts noted largest cyst within lower   pole measuring 2.4 cm and in upper pole measuring 2.3 cm.    The bladder is contracted surrounding indwelling Hardin catheter.      IMPRESSION:  Bilateral increased cortical echogenicity consistent with chronic medical   renal disease.  No hydronephrosis, mass or calculus.   Hardin catheter in place.                  RADIOLOGY & ADDITIONAL TESTS:

## 2017-11-23 NOTE — PROGRESS NOTE ADULT - ASSESSMENT
KOFI on CRF(III): DM and hx Li use most likely etiologies  +chronic gama for (?) bladder dysfunction with no signs of obstruction on outpatient sono done 11/21/17.  No hydro on renal sonogram       Hypercalcemia with hx of parathyroid disease  watch labs on Sensipar  Follow up PTH     Hypernatremia - Continue 1/2 NS    UTI - Start Ceftriaxone , follow up 11-22 urine culture

## 2017-11-24 LAB
-  AMIKACIN: SIGNIFICANT CHANGE UP
-  AZTREONAM: SIGNIFICANT CHANGE UP
-  CEFEPIME: SIGNIFICANT CHANGE UP
-  CEFTAZIDIME: SIGNIFICANT CHANGE UP
-  CIPROFLOXACIN: SIGNIFICANT CHANGE UP
-  GENTAMICIN: SIGNIFICANT CHANGE UP
-  IMIPENEM: SIGNIFICANT CHANGE UP
-  LEVOFLOXACIN: SIGNIFICANT CHANGE UP
-  MEROPENEM: SIGNIFICANT CHANGE UP
-  PIPERACILLIN/TAZOBACTAM: SIGNIFICANT CHANGE UP
-  TOBRAMYCIN: SIGNIFICANT CHANGE UP
ANION GAP SERPL CALC-SCNC: 13 MMOL/L — SIGNIFICANT CHANGE UP (ref 5–17)
BUN SERPL-MCNC: 26 MG/DL — HIGH (ref 8–20)
CALCIUM SERPL-MCNC: 10 MG/DL — SIGNIFICANT CHANGE UP (ref 8.4–10.5)
CALCIUM SERPL-MCNC: 10.2 MG/DL — SIGNIFICANT CHANGE UP (ref 8.6–10.2)
CHLORIDE SERPL-SCNC: 116 MMOL/L — HIGH (ref 98–107)
CO2 SERPL-SCNC: 23 MMOL/L — SIGNIFICANT CHANGE UP (ref 22–29)
CREAT SERPL-MCNC: 1.92 MG/DL — HIGH (ref 0.5–1.3)
CULTURE RESULTS: SIGNIFICANT CHANGE UP
GLUCOSE BLDC GLUCOMTR-MCNC: 100 MG/DL — HIGH (ref 70–99)
GLUCOSE BLDC GLUCOMTR-MCNC: 120 MG/DL — HIGH (ref 70–99)
GLUCOSE BLDC GLUCOMTR-MCNC: 264 MG/DL — HIGH (ref 70–99)
GLUCOSE BLDC GLUCOMTR-MCNC: 341 MG/DL — HIGH (ref 70–99)
GLUCOSE SERPL-MCNC: 103 MG/DL — SIGNIFICANT CHANGE UP (ref 70–115)
HCT VFR BLD CALC: 34.4 % — LOW (ref 42–52)
HGB BLD-MCNC: 11 G/DL — LOW (ref 14–18)
MAGNESIUM SERPL-MCNC: 2.1 MG/DL — SIGNIFICANT CHANGE UP (ref 1.6–2.6)
MCHC RBC-ENTMCNC: 30.5 PG — SIGNIFICANT CHANGE UP (ref 27–31)
MCHC RBC-ENTMCNC: 32 G/DL — SIGNIFICANT CHANGE UP (ref 32–36)
MCV RBC AUTO: 95.3 FL — HIGH (ref 80–94)
METHOD TYPE: SIGNIFICANT CHANGE UP
ORGANISM # SPEC MICROSCOPIC CNT: SIGNIFICANT CHANGE UP
ORGANISM # SPEC MICROSCOPIC CNT: SIGNIFICANT CHANGE UP
PLATELET # BLD AUTO: 135 K/UL — LOW (ref 150–400)
POTASSIUM SERPL-MCNC: 3.9 MMOL/L — SIGNIFICANT CHANGE UP (ref 3.5–5.3)
POTASSIUM SERPL-SCNC: 3.9 MMOL/L — SIGNIFICANT CHANGE UP (ref 3.5–5.3)
RBC # BLD: 3.61 M/UL — LOW (ref 4.6–6.2)
RBC # FLD: 12.6 % — SIGNIFICANT CHANGE UP (ref 11–15.6)
SODIUM SERPL-SCNC: 152 MMOL/L — HIGH (ref 135–145)
SPECIMEN SOURCE: SIGNIFICANT CHANGE UP
WBC # BLD: 5.7 K/UL — SIGNIFICANT CHANGE UP (ref 4.8–10.8)
WBC # FLD AUTO: 5.7 K/UL — SIGNIFICANT CHANGE UP (ref 4.8–10.8)

## 2017-11-24 PROCEDURE — 99233 SBSQ HOSP IP/OBS HIGH 50: CPT

## 2017-11-24 RX ORDER — SODIUM CHLORIDE 9 MG/ML
1000 INJECTION, SOLUTION INTRAVENOUS
Qty: 0 | Refills: 0 | Status: DISCONTINUED | OUTPATIENT
Start: 2017-11-24 | End: 2017-11-24

## 2017-11-24 RX ORDER — SODIUM CHLORIDE 9 MG/ML
1000 INJECTION INTRAMUSCULAR; INTRAVENOUS; SUBCUTANEOUS
Qty: 0 | Refills: 0 | Status: DISCONTINUED | OUTPATIENT
Start: 2017-11-24 | End: 2017-11-27

## 2017-11-24 RX ORDER — INSULIN LISPRO 100/ML
8 VIAL (ML) SUBCUTANEOUS ONCE
Qty: 0 | Refills: 0 | Status: COMPLETED | OUTPATIENT
Start: 2017-11-24 | End: 2017-11-24

## 2017-11-24 RX ADMIN — HALOPERIDOL DECANOATE 0.5 MILLIGRAM(S): 100 INJECTION INTRAMUSCULAR at 11:46

## 2017-11-24 RX ADMIN — HEPARIN SODIUM 5000 UNIT(S): 5000 INJECTION INTRAVENOUS; SUBCUTANEOUS at 23:11

## 2017-11-24 RX ADMIN — CINACALCET 30 MILLIGRAM(S): 30 TABLET, FILM COATED ORAL at 11:46

## 2017-11-24 RX ADMIN — TAMSULOSIN HYDROCHLORIDE 0.4 MILLIGRAM(S): 0.4 CAPSULE ORAL at 23:11

## 2017-11-24 RX ADMIN — Medication 6: at 11:30

## 2017-11-24 RX ADMIN — Medication 1 MILLIGRAM(S): at 18:22

## 2017-11-24 RX ADMIN — HEPARIN SODIUM 5000 UNIT(S): 5000 INJECTION INTRAVENOUS; SUBCUTANEOUS at 05:39

## 2017-11-24 RX ADMIN — Medication 5 MILLIGRAM(S): at 23:11

## 2017-11-24 RX ADMIN — Medication 5 MILLIGRAM(S): at 05:39

## 2017-11-24 RX ADMIN — PANTOPRAZOLE SODIUM 40 MILLIGRAM(S): 20 TABLET, DELAYED RELEASE ORAL at 05:39

## 2017-11-24 RX ADMIN — QUETIAPINE FUMARATE 25 MILLIGRAM(S): 200 TABLET, FILM COATED ORAL at 18:22

## 2017-11-24 RX ADMIN — HEPARIN SODIUM 5000 UNIT(S): 5000 INJECTION INTRAVENOUS; SUBCUTANEOUS at 14:51

## 2017-11-24 RX ADMIN — SODIUM CHLORIDE 80 MILLILITER(S): 9 INJECTION INTRAMUSCULAR; INTRAVENOUS; SUBCUTANEOUS at 14:51

## 2017-11-24 RX ADMIN — Medication 1 GRAM(S): at 11:29

## 2017-11-24 RX ADMIN — QUETIAPINE FUMARATE 25 MILLIGRAM(S): 200 TABLET, FILM COATED ORAL at 09:11

## 2017-11-24 RX ADMIN — Medication 1 GRAM(S): at 18:22

## 2017-11-24 RX ADMIN — Medication 20 MILLIGRAM(S): at 18:22

## 2017-11-24 RX ADMIN — Medication 20 MILLIGRAM(S): at 05:39

## 2017-11-24 RX ADMIN — Medication 1 MILLIGRAM(S): at 05:39

## 2017-11-24 RX ADMIN — Medication 1 GRAM(S): at 23:11

## 2017-11-24 RX ADMIN — Medication 1 GRAM(S): at 05:39

## 2017-11-24 RX ADMIN — INSULIN GLARGINE 22 UNIT(S): 100 INJECTION, SOLUTION SUBCUTANEOUS at 23:11

## 2017-11-24 RX ADMIN — Medication 75 MICROGRAM(S): at 05:39

## 2017-11-24 RX ADMIN — ATORVASTATIN CALCIUM 40 MILLIGRAM(S): 80 TABLET, FILM COATED ORAL at 23:11

## 2017-11-24 RX ADMIN — Medication 100 MILLIGRAM(S): at 23:11

## 2017-11-24 RX ADMIN — QUETIAPINE FUMARATE 100 MILLIGRAM(S): 200 TABLET, FILM COATED ORAL at 23:11

## 2017-11-24 RX ADMIN — Medication 8 UNIT(S): at 23:46

## 2017-11-24 RX ADMIN — Medication 5 MILLIGRAM(S): at 14:51

## 2017-11-24 NOTE — PROGRESS NOTE ADULT - SUBJECTIVE AND OBJECTIVE BOX
NEPHROLOGY INTERVAL HPI/OVERNIGHT EVENTS:    Examined earlier  Looks comfortable    MEDICATIONS  (STANDING):  atorvastatin 40 milliGRAM(s) Oral at bedtime  benztropine 1 milliGRAM(s) Oral two times a day  busPIRone 5 milliGRAM(s) Oral three times a day  cinacalcet 30 milliGRAM(s) Oral daily  dextrose 5%. 1000 milliLiter(s) (50 mL/Hr) IV Continuous <Continuous>  dextrose 5%. 1000 milliLiter(s) (50 mL/Hr) IV Continuous <Continuous>  dextrose 50% Injectable 12.5 Gram(s) IV Push once  dextrose 50% Injectable 25 Gram(s) IV Push once  dextrose 50% Injectable 25 Gram(s) IV Push once  heparin  Injectable 5000 Unit(s) SubCutaneous every 8 hours  insulin glargine Injectable (LANTUS) 22 Unit(s) SubCutaneous at bedtime  insulin lispro (HumaLOG) corrective regimen sliding scale   SubCutaneous three times a day before meals  levothyroxine 75 MICROGram(s) Oral daily  pantoprazole    Tablet 40 milliGRAM(s) Oral before breakfast  propranolol 20 milliGRAM(s) Oral two times a day  QUEtiapine 100 milliGRAM(s) Oral at bedtime  QUEtiapine 25 milliGRAM(s) Oral <User Schedule>  sucralfate 1 Gram(s) Oral Before meals and at bedtime  tamsulosin 0.4 milliGRAM(s) Oral at bedtime  traZODone 100 milliGRAM(s) Oral at bedtime    MEDICATIONS  (PRN):  dextrose Gel 1 Dose(s) Oral once PRN Blood Glucose LESS THAN 70 milliGRAM(s)/deciliter  glucagon  Injectable 1 milliGRAM(s) IntraMuscular once PRN Glucose LESS THAN 70 milligrams/deciliter  haloperidol     Tablet 0.5 milliGRAM(s) Oral every 8 hours PRN agitation      Allergies    No Known Allergies    Intolerances        Vital Signs Last 24 Hrs  T(C): 36.8 (2017 23:45), Max: 36.9 (2017 11:38)  T(F): 98.3 (2017 23:45), Max: 98.5 (2017 15:05)  HR: 74 (2017 05:37) (63 - 77)  BP: 134/76 (2017 05:37) (128/82 - 158/77)  BP(mean): --  RR: 18 (2017 23:45) (16 - 19)  SpO2: 99% (2017 23:45) (97% - 99%)  Daily     Daily     PHYSICAL EXAM:  HEAD:  Atraumatic, Normocephalic  EYES: EOMI, PERRLA, conjunctiva and sclera clear  NECK: Supple, No JVD, Normal thyroid  NERVOUS SYSTEM:  Alert & Oriented; intact and symmetric  CHEST/LUNG: Clear to percussion bilaterally  HEART: Regular rate and rhythm; No rub  ABDOMEN: Soft, Nontender, Nondistended; Bowel sounds present  EXTREMITIES:  2+ Peripheral Pulses, No edema    LABS:                        11.0   5.7   )-----------( 135      ( 2017 08:12 )             34.4         152<H>  |  116<H>  |  26.0<H>  ----------------------------<  103  3.9   |  23.0  |  1.92<H>    Ca    10.2      2017 08:12  Phos  3.4       Mg     2.1         TPro  6.0<L>  /  Alb  3.4  /  TBili  0.5  /  DBili  x   /  AST  14  /  ALT  18  /  AlkPhos  86      PT/INR - ( 2017 14:28 )   PT: 12.0 sec;   INR: 1.09 ratio         PTT - ( 2017 14:28 )  PTT:27.6 sec  Urinalysis Basic - ( 2017 20:39 )    Color: Yellow / Appearance: Clear / S.010 / pH: x  Gluc: x / Ketone: Negative  / Bili: Negative / Urobili: Negative mg/dL   Blood: x / Protein: 30 mg/dL / Nitrite: Positive   Leuk Esterase: Moderate / RBC: 0-2 /HPF / WBC 26-50   Sq Epi: x / Non Sq Epi: Occasional / Bacteria: Moderate      Magnesium, Serum: 2.1 mg/dL ( @ 08:12)  Intact PTH: 62 pg/mL ( @ 13:44)          RADIOLOGY & ADDITIONAL TESTS:

## 2017-11-24 NOTE — PROGRESS NOTE ADULT - ASSESSMENT
KOFI on CKD(III): DM and hx Li use (for 20 yrs now off Li) most likely etiologies  +chronic gama for (?) bladder dysfunction with no signs of obstruction on outpatient sono done 11/21/17.  No hydro on renal sonogram     Hypercalcemia with hx of parathyroid disease  Ca improving cont to watch labs on Sensipar  Follow up PTH     Hypernatremia - will adjust IVF make more hypotonic    UTI -   11-22 urine culture+ pseudomonas abx?

## 2017-11-24 NOTE — PROGRESS NOTE ADULT - SUBJECTIVE AND OBJECTIVE BOX
MASOUD ELLIS  ----------------------------------------    CC: FOLLOW up visit for KOFI and Hypernatremia    Urine cx: Pseudomonas But pt asymptomatic  stopped ceftriaxone  consulted Urology for catheter change and also evaluate need for Hardin  afebrile    The patient is in no acute distress.  Denied any chest pain, palpitations, dyspnea, or abdominal pain.    Vital Signs Last 24 Hrs  T(C): 36.4 (2017 12:04), Max: 36.9 (2017 15:05)  T(F): 97.5 (2017 12:04), Max: 98.5 (2017 15:05)  HR: 58 (2017 12:04) (58 - 77)  BP: 163/84 (2017 12:04) (128/82 - 163/84)  BP(mean): --  RR: 18 (2017 12:04) (18 - 19)  SpO2: 98% (2017 12:04) (97% - 99%)    CAPILLARY BLOOD GLUCOSE      POCT Blood Glucose.: 264 mg/dL (2017 11:26)  POCT Blood Glucose.: 100 mg/dL (2017 07:35)  POCT Blood Glucose.: 126 mg/dL (2017 22:17)  POCT Blood Glucose.: 101 mg/dL (2017 17:46)    PHYSICAL EXAMINATION:  ----------------------------------------    General appearance: NAD, Awake, Alert  HEENT: NCAT, Conjunctiva clear, EOMI  Neck: Supple, No JVD  Lungs: Clear to auscultation, Breath sound equal bilaterally  Cardiovascular: S1S2, Regular rhythm  Abdomen: Soft, Nontender, Positive bowel sounds  Extremities: No clubbing, No cyanosis, No edema  + Hardin with clear Urine  CNS: alert and oriented times 2      LABORATORY STUDIES:  ----------------------------------------                        11.0   5.7   )-----------( 135      ( 2017 08:12 )             34.4     11-24    152<H>  |  116<H>  |  26.0<H>  ----------------------------<  103  3.9   |  23.0  |  1.92<H>    Ca    10.2      2017 08:12  Phos  3.4       Mg     2.1         TPro  6.0<L>  /  Alb  3.4  /  T Bili  0.5  /  DBili  x   /  AST  14  /  ALT  18  /  AlkPhos  86  -    LIVER FUNCTIONS - ( 2017 15:09 )  Alb: 3.4 g/dL / Pro: 6.0 g/dL / ALK PHOS: 86 U/L / ALT: 18 U/L / AST: 14 U/L / GGT: x           PT/INR - ( 2017 14:28 )   PT: 12.0 sec;   INR: 1.09 ratio         PTT - ( 2017 14:28 )  PTT:27.6 sec          Urinalysis Basic - ( 2017 20:39 )    Color: Yellow / Appearance: Clear / S.010 / pH: x  Gluc: x / Ketone: Negative  / Bili: Negative / Urobili: Negative mg/dL   Blood: x / Protein: 30 mg/dL / Nitrite: Positive   Leuk Esterase: Moderate / RBC: 0-2 /HPF / WBC 26-50   Sq Epi: x / Non Sq Epi: Occasional / Bacteria: Moderate        Culture - Urine (collected 2017 14:33)  Source: .Urine Clean Catch (Midstream)  Final Report (2017 08:48):    >100,000 CFU/ml Pseudomonas aeruginosa  Organism: Pseudomonas aeruginosa (2017 08:48)  Organism: Pseudomonas aeruginosa (2017 08:48)        MEDICATIONS  (STANDING):  atorvastatin 40 milliGRAM(s) Oral at bedtime  benztropine 1 milliGRAM(s) Oral two times a day  busPIRone 5 milliGRAM(s) Oral three times a day  cinacalcet 30 milliGRAM(s) Oral daily  dextrose 5%. 1000 milliLiter(s) (50 mL/Hr) IV Continuous <Continuous>  dextrose 50% Injectable 12.5 Gram(s) IV Push once  dextrose 50% Injectable 25 Gram(s) IV Push once  dextrose 50% Injectable 25 Gram(s) IV Push once  heparin  Injectable 5000 Unit(s) SubCutaneous every 8 hours  insulin glargine Injectable (LANTUS) 22 Unit(s) SubCutaneous at bedtime  insulin lispro (HumaLOG) corrective regimen sliding scale   SubCutaneous three times a day before meals  levothyroxine 75 MICROGram(s) Oral daily  pantoprazole    Tablet 40 milliGRAM(s) Oral before breakfast  propranolol 20 milliGRAM(s) Oral two times a day  QUEtiapine 100 milliGRAM(s) Oral at bedtime  QUEtiapine 25 milliGRAM(s) Oral <User Schedule>  sodium chloride 0.225%. 1000 milliLiter(s) (80 mL/Hr) IV Continuous <Continuous>  sucralfate 1 Gram(s) Oral Before meals and at bedtime  tamsulosin 0.4 milliGRAM(s) Oral at bedtime  traZODone 100 milliGRAM(s) Oral at bedtime    MEDICATIONS  (PRN):  dextrose Gel 1 Dose(s) Oral once PRN Blood Glucose LESS THAN 70 milliGRAM(s)/deciliter  glucagon  Injectable 1 milliGRAM(s) IntraMuscular once PRN Glucose LESS THAN 70 milligrams/deciliter  haloperidol     Tablet 0.5 milliGRAM(s) Oral every 8 hours PRN agitation      ASSESSMENT / PLAN:  ----------------------------------------

## 2017-11-24 NOTE — PROGRESS NOTE ADULT - ASSESSMENT
This is a 68 yo  man  with PMH of DM, HTN, psychiatric disease on Lithium for "years", prostate disease s/p surgery, chronic indwelling gama for "nonfunctioning bladder", parathyroid disease s/p resection and CRF who was following a nephrologist in [place] but was moved to [facility] in the recent past, admitted for worsening renal function. Pt report diarrhea coupe of days ago, loose stool, resolved now, also report poor oral intake. Labs here showed BUN 46, Cr2.80, Sodium 150, calcium 10.4    A/P    >Acute on CKD -3-   likely due to dehydration   continue IVF,   Renal USG : no hydronephrosis  Nephrology consulted, Appreciate recommendations    Hypernatremia due to dehydration   stop 1/2 NS and start D 5 W @ 50 CC/HR for 14 hrs  repeat BMP later this evening    Chronic gama : ? bladder dysfunction    GERD - PPI     Essential HTN - normotensive   continue home medicationsl     >Bipolar disorder and other psych comorbidities- c/w home medication     Type 2 DM with hyperglycemia :  c/w lantus and sliding scale   A1c    >DVT Ppx- heparin This is a 66 yo  man  with PMH of DM, HTN, psychiatric disease on Lithium for "years", prostate disease s/p surgery, chronic indwelling gama for "nonfunctioning bladder", parathyroid disease s/p resection and CRF who was following a nephrologist in [place] but was moved to [facility] in the recent past, admitted for worsening renal function. Pt report diarrhea coupe of days ago, loose stool, resolved now, also report poor oral intake. Labs here showed BUN 46, Cr2.80, Sodium 150, calcium 10.4    A/P    >Acute on CKD -3- Pre renal, improving  likely due to dehydration   continue IVF,   Renal USG : no hydronephrosis  Nephrology consulted, Appreciate recommendations    Hypernatremia due to dehydration   stop 1/2 NS and start D 5 W @ 50 CC/HR for 14 hrs  repeat BMP later this evening    Chronic gama : ? bladder dysfunction  because of Pseudomonas in urine, likely contaminant, recommended change gama  consult Urology today  USG: No hydronephrosis    GERD - PPI     Essential HTN - normotensive   continue home medications     >Bipolar disorder and other psych comorbidities- c/w home medication     Type 2 DM with hyperglycemia :  c/w lantus and sliding scale   A1c : 5.6    >DVT Prophylaxis - SQ heparin

## 2017-11-25 LAB
ANION GAP SERPL CALC-SCNC: 13 MMOL/L — SIGNIFICANT CHANGE UP (ref 5–17)
BUN SERPL-MCNC: 25 MG/DL — HIGH (ref 8–20)
CALCIUM SERPL-MCNC: 10.3 MG/DL — HIGH (ref 8.6–10.2)
CHLORIDE SERPL-SCNC: 112 MMOL/L — HIGH (ref 98–107)
CO2 SERPL-SCNC: 22 MMOL/L — SIGNIFICANT CHANGE UP (ref 22–29)
CREAT SERPL-MCNC: 1.76 MG/DL — HIGH (ref 0.5–1.3)
GLUCOSE BLDC GLUCOMTR-MCNC: 168 MG/DL — HIGH (ref 70–99)
GLUCOSE BLDC GLUCOMTR-MCNC: 171 MG/DL — HIGH (ref 70–99)
GLUCOSE BLDC GLUCOMTR-MCNC: 223 MG/DL — HIGH (ref 70–99)
GLUCOSE BLDC GLUCOMTR-MCNC: 267 MG/DL — HIGH (ref 70–99)
GLUCOSE SERPL-MCNC: 85 MG/DL — SIGNIFICANT CHANGE UP (ref 70–115)
POTASSIUM SERPL-MCNC: 3.7 MMOL/L — SIGNIFICANT CHANGE UP (ref 3.5–5.3)
POTASSIUM SERPL-SCNC: 3.7 MMOL/L — SIGNIFICANT CHANGE UP (ref 3.5–5.3)
SODIUM SERPL-SCNC: 147 MMOL/L — HIGH (ref 135–145)

## 2017-11-25 PROCEDURE — 99232 SBSQ HOSP IP/OBS MODERATE 35: CPT

## 2017-11-25 RX ADMIN — Medication 100 MILLIGRAM(S): at 21:36

## 2017-11-25 RX ADMIN — Medication 75 MICROGRAM(S): at 06:53

## 2017-11-25 RX ADMIN — PANTOPRAZOLE SODIUM 40 MILLIGRAM(S): 20 TABLET, DELAYED RELEASE ORAL at 06:53

## 2017-11-25 RX ADMIN — Medication 1 GRAM(S): at 17:30

## 2017-11-25 RX ADMIN — HEPARIN SODIUM 5000 UNIT(S): 5000 INJECTION INTRAVENOUS; SUBCUTANEOUS at 06:53

## 2017-11-25 RX ADMIN — Medication 5 MILLIGRAM(S): at 21:35

## 2017-11-25 RX ADMIN — Medication 1 MILLIGRAM(S): at 06:53

## 2017-11-25 RX ADMIN — Medication 1 GRAM(S): at 11:57

## 2017-11-25 RX ADMIN — Medication 1 MILLIGRAM(S): at 17:30

## 2017-11-25 RX ADMIN — Medication 2: at 17:30

## 2017-11-25 RX ADMIN — Medication 4: at 11:56

## 2017-11-25 RX ADMIN — Medication 1 GRAM(S): at 06:53

## 2017-11-25 RX ADMIN — Medication 5 MILLIGRAM(S): at 06:53

## 2017-11-25 RX ADMIN — Medication 20 MILLIGRAM(S): at 17:30

## 2017-11-25 RX ADMIN — HEPARIN SODIUM 5000 UNIT(S): 5000 INJECTION INTRAVENOUS; SUBCUTANEOUS at 12:18

## 2017-11-25 RX ADMIN — Medication 5 MILLIGRAM(S): at 12:18

## 2017-11-25 RX ADMIN — QUETIAPINE FUMARATE 25 MILLIGRAM(S): 200 TABLET, FILM COATED ORAL at 08:01

## 2017-11-25 RX ADMIN — HEPARIN SODIUM 5000 UNIT(S): 5000 INJECTION INTRAVENOUS; SUBCUTANEOUS at 21:35

## 2017-11-25 RX ADMIN — TAMSULOSIN HYDROCHLORIDE 0.4 MILLIGRAM(S): 0.4 CAPSULE ORAL at 21:36

## 2017-11-25 RX ADMIN — INSULIN GLARGINE 22 UNIT(S): 100 INJECTION, SOLUTION SUBCUTANEOUS at 21:35

## 2017-11-25 RX ADMIN — Medication 20 MILLIGRAM(S): at 06:53

## 2017-11-25 RX ADMIN — ATORVASTATIN CALCIUM 40 MILLIGRAM(S): 80 TABLET, FILM COATED ORAL at 21:35

## 2017-11-25 RX ADMIN — QUETIAPINE FUMARATE 100 MILLIGRAM(S): 200 TABLET, FILM COATED ORAL at 21:37

## 2017-11-25 RX ADMIN — QUETIAPINE FUMARATE 25 MILLIGRAM(S): 200 TABLET, FILM COATED ORAL at 12:19

## 2017-11-25 RX ADMIN — Medication 1 GRAM(S): at 21:36

## 2017-11-25 RX ADMIN — Medication 2: at 08:02

## 2017-11-25 RX ADMIN — CINACALCET 30 MILLIGRAM(S): 30 TABLET, FILM COATED ORAL at 12:19

## 2017-11-25 NOTE — PROGRESS NOTE ADULT - SUBJECTIVE AND OBJECTIVE BOX
MSAOUD CALEB  ----------------------------------------    CC:  Follow up visit for KOFI, urinary retention, Hypernatremia  feels weak and requesting PT  No family at bed side  NO CP    Discussed voiding trial  Denied any chest pain, palpitations, dyspnea, or abdominal pain.    Vital Signs Last 24 Hrs  T(C): 36.7 (24 Nov 2017 23:51), Max: 36.7 (24 Nov 2017 16:44)  T(F): 98 (24 Nov 2017 23:51), Max: 98 (24 Nov 2017 16:44)  HR: 57 (24 Nov 2017 23:51) (57 - 60)  BP: 159/72 (24 Nov 2017 23:51) (159/72 - 161/83)  BP(mean): --  RR: 15 (24 Nov 2017 23:51) (15 - 18)  SpO2: 96% (24 Nov 2017 23:51) (96% - 98%)    CAPILLARY BLOOD GLUCOSE      POCT Blood Glucose.: 223 mg/dL (25 Nov 2017 11:55)  POCT Blood Glucose.: 168 mg/dL (25 Nov 2017 07:59)  POCT Blood Glucose.: 341 mg/dL (24 Nov 2017 23:09)  POCT Blood Glucose.: 120 mg/dL (24 Nov 2017 16:26)    PHYSICAL EXAMINATION:  ----------------------------------------  General appearance: NAD, Awake, Alert  HEENT: NCAT, Conjunctiva clear, EOMI  Neck: Supple, No JVD  Lungs: Clear to auscultation, Breath sound equal bilaterally  Cardiovascular: S1S2, Regular rhythm  Abdomen: Soft, Nontender, Positive bowel sounds  Extremities: No clubbing, No cyanosis, No edema  CNS: alert and oriented times 2      LABORATORY STUDIES:  ----------------------------------------                        11.0   5.7   )-----------( 135      ( 24 Nov 2017 08:12 )             34.4     11-25    147<H>  |  112<H>  |  25.0<H>  ----------------------------<  85  3.7   |  22.0  |  1.76<H>    Ca    10.3<H>      25 Nov 2017 08:11  Mg     2.1     11-24        MEDICATIONS  (STANDING):    atorvastatin 40 milliGRAM(s) Oral at bedtime  benztropine 1 milliGRAM(s) Oral two times a day  busPIRone 5 milliGRAM(s) Oral three times a day  cinacalcet 30 milliGRAM(s) Oral daily  dextrose 5%. 1000 milliLiter(s) (50 mL/Hr) IV Continuous <Continuous>  dextrose 50% Injectable 12.5 Gram(s) IV Push once  dextrose 50% Injectable 25 Gram(s) IV Push once  dextrose 50% Injectable 25 Gram(s) IV Push once  heparin  Injectable 5000 Unit(s) SubCutaneous every 8 hours  insulin glargine Injectable (LANTUS) 22 Unit(s) SubCutaneous at bedtime  insulin lispro (HumaLOG) corrective regimen sliding scale   SubCutaneous three times a day before meals  levothyroxine 75 MICROGram(s) Oral daily  pantoprazole    Tablet 40 milliGRAM(s) Oral before breakfast  propranolol 20 milliGRAM(s) Oral two times a day  QUEtiapine 100 milliGRAM(s) Oral at bedtime  QUEtiapine 25 milliGRAM(s) Oral <User Schedule>  sodium chloride 0.225%. 1000 milliLiter(s) (80 mL/Hr) IV Continuous <Continuous>  sucralfate 1 Gram(s) Oral Before meals and at bedtime  tamsulosin 0.4 milliGRAM(s) Oral at bedtime  traZODone 100 milliGRAM(s) Oral at bedtime    MEDICATIONS  (PRN):  dextrose Gel 1 Dose(s) Oral once PRN Blood Glucose LESS THAN 70 milliGRAM(s)/deciliter  glucagon  Injectable 1 milliGRAM(s) IntraMuscular once PRN Glucose LESS THAN 70 milligrams/deciliter  haloperidol     Tablet 0.5 milliGRAM(s) Oral every 8 hours PRN agitation      ASSESSMENT / PLAN:  ----------------------------------------

## 2017-11-25 NOTE — PROGRESS NOTE ADULT - ASSESSMENT
This is a 66 yo  man  with PMH of DM, HTN, psychiatric disease on Lithium for "years", prostate disease s/p surgery, chronic indwelling gama for "nonfunctioning bladder", parathyroid disease s/p resection and CRF who was following a nephrologist in [place] but was moved to [facility] in the recent past, admitted for worsening renal function. Pt report diarrhea coupe of days ago, loose stool, resolved now, also report poor oral intake. Labs here showed BUN 46, Cr2.80, Sodium 150, calcium 10.4    A/P    >Acute on CKD -3- Pre renal, improving  likely due to dehydration   continue IVF,   Renal USG : no hydronephrosis  Nephrology consulted, Appreciate recommendations    Hypernatremia due to dehydration   stop 1/2 NS and start D 5 W @ 50 CC/HR for 14 hrs  repeat BMP later this evening    Chronic gama : ? bladder dysfunction  because of Pseudomonas in urine, likely contaminant, recommended change gama  consult Urology today  USG: No hydronephrosis    GERD - PPI     Essential HTN - normotensive   continue home medications     >Bipolar disorder and other psych comorbidities- c/w home medication     Type 2 DM with hyperglycemia :  c/w lantus and sliding scale   A1c : 5.6    >DVT Prophylaxis - SQ heparin

## 2017-11-25 NOTE — PROGRESS NOTE ADULT - ASSESSMENT
This is a 68 yo  man  with PMH of DM, HTN, psychiatric disease on Lithium for "years", prostate disease s/p surgery, chronic indwelling gama for "nonfunctioning bladder", parathyroid disease s/p resection and CRF who was following a nephrologist in [place] but was moved to [facility] in the recent past, admitted for worsening renal function. Pt report diarrhea coupe of days ago, loose stool, resolved now, also report poor oral intake. Labs here showed BUN 46, Cr2.80, Sodium 150, calcium 10.4    A/P    >Acute on CKD -3- Pre renal, improving  likely due to dehydration   stopped fluids today and Monitor off of fluids  Renal USG : no hydronephrosis  Nephrology consulted, Appreciate recommendations    Hypernatremia due to dehydration   improved with  D 5 W @ 50 CC/HR for 14 hrs  stopped fluids  encouraged free water intake  repeat BMP in am    Chronic gama : ? bladder dysfunction  because of Pseudomonas in urine, likely contaminant, recommended change gama  USG: No hydronephrosis  removed Gama  voiding trial today and if Un successful replace Gama spoke with Nursing staff    GERD - PPI     Essential HTN - normotensive   continue home medications     >Bipolar disorder and other psych comorbidities- c/w home medication     Type 2 DM with hyperglycemia :  c/w lantus and sliding scale   A1c : 5.6    >DVT Prophylaxis - SQ heparin

## 2017-11-25 NOTE — PROGRESS NOTE ADULT - SUBJECTIVE AND OBJECTIVE BOX
MASOUD ELLIS  ----------------------------------------  The patient was seen and evaluated for   The patient is in no acute distress.  Denied any chest pain, palpitations, dyspnea, or abdominal pain.    Vital Signs Last 24 Hrs  T(C): 36.7 (24 Nov 2017 23:51), Max: 36.7 (24 Nov 2017 16:44)  T(F): 98 (24 Nov 2017 23:51), Max: 98 (24 Nov 2017 16:44)  HR: 57 (24 Nov 2017 23:51) (57 - 60)  BP: 159/72 (24 Nov 2017 23:51) (159/72 - 163/84)  BP(mean): --  RR: 15 (24 Nov 2017 23:51) (15 - 18)  SpO2: 96% (24 Nov 2017 23:51) (96% - 98%)    CAPILLARY BLOOD GLUCOSE      POCT Blood Glucose.: 168 mg/dL (25 Nov 2017 07:59)  POCT Blood Glucose.: 341 mg/dL (24 Nov 2017 23:09)  POCT Blood Glucose.: 120 mg/dL (24 Nov 2017 16:26)  POCT Blood Glucose.: 264 mg/dL (24 Nov 2017 11:26)    PHYSICAL EXAMINATION:  ----------------------------------------    General appearance: NAD, Awake, Alert  HEENT: NCAT, Conjunctiva clear, EOMI  Neck: Supple, No JVD  Lungs: Clear to auscultation, Breath sound equal bilaterally  Cardiovascular: S1S2, Regular rhythm  Abdomen: Soft, Nontender, Positive bowel sounds  Extremities: No clubbing, No cyanosis, No edema  CNS: alert      LABORATORY STUDIES:  ----------------------------------------                        11.0   5.7   )-----------( 135      ( 24 Nov 2017 08:12 )             34.4     11-25    147<H>  |  112<H>  |  25.0<H>  ----------------------------<  85  3.7   |  22.0  |  1.76<H>    Ca    10.3<H>      25 Nov 2017 08:11  Mg     2.1     11-24                      Culture - Urine (collected 22 Nov 2017 14:33)  Source: .Urine Clean Catch (Midstream)  Final Report (24 Nov 2017 08:48):    >100,000 CFU/ml Pseudomonas aeruginosa  Organism: Pseudomonas aeruginosa (24 Nov 2017 08:48)  Organism: Pseudomonas aeruginosa (24 Nov 2017 08:48)        MEDICATIONS  (STANDING):  atorvastatin 40 milliGRAM(s) Oral at bedtime  benztropine 1 milliGRAM(s) Oral two times a day  busPIRone 5 milliGRAM(s) Oral three times a day  cinacalcet 30 milliGRAM(s) Oral daily  dextrose 5%. 1000 milliLiter(s) (50 mL/Hr) IV Continuous <Continuous>  dextrose 50% Injectable 12.5 Gram(s) IV Push once  dextrose 50% Injectable 25 Gram(s) IV Push once  dextrose 50% Injectable 25 Gram(s) IV Push once  heparin  Injectable 5000 Unit(s) SubCutaneous every 8 hours  insulin glargine Injectable (LANTUS) 22 Unit(s) SubCutaneous at bedtime  insulin lispro (HumaLOG) corrective regimen sliding scale   SubCutaneous three times a day before meals  levothyroxine 75 MICROGram(s) Oral daily  pantoprazole    Tablet 40 milliGRAM(s) Oral before breakfast  propranolol 20 milliGRAM(s) Oral two times a day  QUEtiapine 100 milliGRAM(s) Oral at bedtime  QUEtiapine 25 milliGRAM(s) Oral <User Schedule>  sodium chloride 0.225%. 1000 milliLiter(s) (80 mL/Hr) IV Continuous <Continuous>  sucralfate 1 Gram(s) Oral Before meals and at bedtime  tamsulosin 0.4 milliGRAM(s) Oral at bedtime  traZODone 100 milliGRAM(s) Oral at bedtime    MEDICATIONS  (PRN):  dextrose Gel 1 Dose(s) Oral once PRN Blood Glucose LESS THAN 70 milliGRAM(s)/deciliter  glucagon  Injectable 1 milliGRAM(s) IntraMuscular once PRN Glucose LESS THAN 70 milligrams/deciliter  haloperidol     Tablet 0.5 milliGRAM(s) Oral every 8 hours PRN agitation      ASSESSMENT / PLAN:  ----------------------------------------

## 2017-11-26 LAB
ANION GAP SERPL CALC-SCNC: 11 MMOL/L — SIGNIFICANT CHANGE UP (ref 5–17)
BUN SERPL-MCNC: 31 MG/DL — HIGH (ref 8–20)
CALCIUM SERPL-MCNC: 10.2 MG/DL — SIGNIFICANT CHANGE UP (ref 8.6–10.2)
CHLORIDE SERPL-SCNC: 114 MMOL/L — HIGH (ref 98–107)
CO2 SERPL-SCNC: 23 MMOL/L — SIGNIFICANT CHANGE UP (ref 22–29)
CREAT SERPL-MCNC: 1.99 MG/DL — HIGH (ref 0.5–1.3)
GLUCOSE BLDC GLUCOMTR-MCNC: 132 MG/DL — HIGH (ref 70–99)
GLUCOSE BLDC GLUCOMTR-MCNC: 145 MG/DL — HIGH (ref 70–99)
GLUCOSE BLDC GLUCOMTR-MCNC: 154 MG/DL — HIGH (ref 70–99)
GLUCOSE BLDC GLUCOMTR-MCNC: 171 MG/DL — HIGH (ref 70–99)
GLUCOSE SERPL-MCNC: 145 MG/DL — HIGH (ref 70–115)
POTASSIUM SERPL-MCNC: 4 MMOL/L — SIGNIFICANT CHANGE UP (ref 3.5–5.3)
POTASSIUM SERPL-SCNC: 4 MMOL/L — SIGNIFICANT CHANGE UP (ref 3.5–5.3)
SODIUM SERPL-SCNC: 148 MMOL/L — HIGH (ref 135–145)

## 2017-11-26 PROCEDURE — 99233 SBSQ HOSP IP/OBS HIGH 50: CPT

## 2017-11-26 RX ORDER — SODIUM CHLORIDE 9 MG/ML
1000 INJECTION, SOLUTION INTRAVENOUS
Qty: 0 | Refills: 0 | Status: DISCONTINUED | OUTPATIENT
Start: 2017-11-26 | End: 2017-11-28

## 2017-11-26 RX ADMIN — CINACALCET 30 MILLIGRAM(S): 30 TABLET, FILM COATED ORAL at 11:31

## 2017-11-26 RX ADMIN — Medication 1 GRAM(S): at 05:29

## 2017-11-26 RX ADMIN — Medication 5 MILLIGRAM(S): at 13:57

## 2017-11-26 RX ADMIN — Medication 5 MILLIGRAM(S): at 05:24

## 2017-11-26 RX ADMIN — HEPARIN SODIUM 5000 UNIT(S): 5000 INJECTION INTRAVENOUS; SUBCUTANEOUS at 23:19

## 2017-11-26 RX ADMIN — Medication 100 MILLIGRAM(S): at 23:21

## 2017-11-26 RX ADMIN — Medication 75 MICROGRAM(S): at 05:23

## 2017-11-26 RX ADMIN — Medication 1 MILLIGRAM(S): at 05:23

## 2017-11-26 RX ADMIN — Medication 5 MILLIGRAM(S): at 23:19

## 2017-11-26 RX ADMIN — QUETIAPINE FUMARATE 100 MILLIGRAM(S): 200 TABLET, FILM COATED ORAL at 23:19

## 2017-11-26 RX ADMIN — Medication 20 MILLIGRAM(S): at 05:23

## 2017-11-26 RX ADMIN — Medication 1 GRAM(S): at 23:19

## 2017-11-26 RX ADMIN — QUETIAPINE FUMARATE 25 MILLIGRAM(S): 200 TABLET, FILM COATED ORAL at 11:31

## 2017-11-26 RX ADMIN — ATORVASTATIN CALCIUM 40 MILLIGRAM(S): 80 TABLET, FILM COATED ORAL at 23:19

## 2017-11-26 RX ADMIN — PANTOPRAZOLE SODIUM 40 MILLIGRAM(S): 20 TABLET, DELAYED RELEASE ORAL at 05:29

## 2017-11-26 RX ADMIN — Medication 2: at 11:30

## 2017-11-26 RX ADMIN — TAMSULOSIN HYDROCHLORIDE 0.4 MILLIGRAM(S): 0.4 CAPSULE ORAL at 23:19

## 2017-11-26 RX ADMIN — Medication 20 MILLIGRAM(S): at 17:27

## 2017-11-26 RX ADMIN — Medication 2: at 16:36

## 2017-11-26 RX ADMIN — Medication 1 MILLIGRAM(S): at 17:26

## 2017-11-26 RX ADMIN — QUETIAPINE FUMARATE 25 MILLIGRAM(S): 200 TABLET, FILM COATED ORAL at 07:31

## 2017-11-26 RX ADMIN — Medication 1 GRAM(S): at 11:31

## 2017-11-26 RX ADMIN — HEPARIN SODIUM 5000 UNIT(S): 5000 INJECTION INTRAVENOUS; SUBCUTANEOUS at 05:23

## 2017-11-26 RX ADMIN — INSULIN GLARGINE 22 UNIT(S): 100 INJECTION, SOLUTION SUBCUTANEOUS at 23:26

## 2017-11-26 RX ADMIN — Medication 1 GRAM(S): at 16:37

## 2017-11-26 RX ADMIN — HEPARIN SODIUM 5000 UNIT(S): 5000 INJECTION INTRAVENOUS; SUBCUTANEOUS at 13:57

## 2017-11-26 NOTE — PROGRESS NOTE ADULT - ASSESSMENT
This is a 66 yo  man  with PMH of DM, HTN, psychiatric disease on Lithium for "years", prostate disease s/p surgery, chronic indwelling gama for "nonfunctioning bladder", parathyroid disease s/p resection and CRF who was following a nephrologist in [place] but was moved to [facility] in the recent past, admitted for worsening renal function. Pt report diarrhea coupe of days ago, loose stool, resolved now, also report poor oral intake. Labs here showed BUN 46, Cr2.80, Sodium 150, calcium 10.4    A/P    >Acute on CKD -3- Pre renal, improving  likely due to dehydration   stopped fluids today and Monitor off of fluids  Renal USG : no hydronephrosis  Nephrology consulted, Appreciate recommendations    Hypernatremia due to dehydration   improved with  D 5 W @ 50 CC/HR for 14 hrs  stopped fluids  encouraged free water intake  repeat BMP in am    Chronic gama : ? bladder dysfunction  because of Pseudomonas in urine, likely contaminant, recommended change gama  USG: No hydronephrosis  removed Gama  voiding trial today and if Un successful replace Gama spoke with Nursing staff    GERD - PPI     Essential HTN - normotensive   continue home medications     >Bipolar disorder and other psych comorbidities- c/w home medication     Type 2 DM with hyperglycemia :  c/w lantus and sliding scale   A1c : 5.6    >DVT Prophylaxis - SQ heparin This is a 68 yo  man  with PMH of DM, HTN, psychiatric disease on Lithium for "years", prostate disease s/p surgery, chronic indwelling gama for "nonfunctioning bladder", parathyroid disease s/p resection and CRF who was following a nephrologist in [place] but was moved to [facility] in the recent past, admitted for worsening renal function. Pt report diarrhea coupe of days ago, loose stool, resolved now, also report poor oral intake. Labs here showed BUN 46, Cr2.80, Sodium 150, calcium 10.4    A/P    >Acute on CKD -3- Pre renal,    due to dehydration   Re started fluids D 5 W @ 50 CC/HR  Renal USG : no hydronephrosis  Nephrology consulted,     Hypernatremia due to dehydration   restart  D 5 W @ 50 CC/HR for 24 hrs  encouraged free water intake  repeat BMP in am    Chronic Gama : for Urinary retention   changed Gama 11/25  USG: No hydronephrosis    Essential HTN - normotensive   continue home medications     >Bipolar disorder and other psych comorbidities- c/w home medication     Type 2 DM with hyperglycemia :  c/w Lantus and sliding scale   A1c : 5.6    Deconditioning : consult PT/OT;     >DVT Prophylaxis - SQ heparin

## 2017-11-26 NOTE — PROGRESS NOTE ADULT - SUBJECTIVE AND OBJECTIVE BOX
NEPHROLOGY INTERVAL HPI/OVERNIGHT EVENTS:    Examined earlier  clinically unchanged    MEDICATIONS  (STANDING):  atorvastatin 40 milliGRAM(s) Oral at bedtime  benztropine 1 milliGRAM(s) Oral two times a day  busPIRone 5 milliGRAM(s) Oral three times a day  cinacalcet 30 milliGRAM(s) Oral daily  dextrose 5%. 1000 milliLiter(s) (50 mL/Hr) IV Continuous <Continuous>  dextrose 5%. 1000 milliLiter(s) (50 mL/Hr) IV Continuous <Continuous>  dextrose 50% Injectable 12.5 Gram(s) IV Push once  dextrose 50% Injectable 25 Gram(s) IV Push once  dextrose 50% Injectable 25 Gram(s) IV Push once  heparin  Injectable 5000 Unit(s) SubCutaneous every 8 hours  insulin glargine Injectable (LANTUS) 22 Unit(s) SubCutaneous at bedtime  insulin lispro (HumaLOG) corrective regimen sliding scale   SubCutaneous three times a day before meals  levothyroxine 75 MICROGram(s) Oral daily  pantoprazole    Tablet 40 milliGRAM(s) Oral before breakfast  propranolol 20 milliGRAM(s) Oral two times a day  QUEtiapine 100 milliGRAM(s) Oral at bedtime  QUEtiapine 25 milliGRAM(s) Oral <User Schedule>  sodium chloride 0.225%. 1000 milliLiter(s) (80 mL/Hr) IV Continuous <Continuous>  sucralfate 1 Gram(s) Oral Before meals and at bedtime  tamsulosin 0.4 milliGRAM(s) Oral at bedtime  traZODone 100 milliGRAM(s) Oral at bedtime    MEDICATIONS  (PRN):  dextrose Gel 1 Dose(s) Oral once PRN Blood Glucose LESS THAN 70 milliGRAM(s)/deciliter  glucagon  Injectable 1 milliGRAM(s) IntraMuscular once PRN Glucose LESS THAN 70 milligrams/deciliter  haloperidol     Tablet 0.5 milliGRAM(s) Oral every 8 hours PRN agitation      Allergies    No Known Allergies    Intolerances        Vital Signs Last 24 Hrs  T(C): 36.8 (26 Nov 2017 07:30), Max: 37.1 (25 Nov 2017 17:02)  T(F): 98.2 (26 Nov 2017 07:30), Max: 98.7 (25 Nov 2017 17:02)  HR: 74 (26 Nov 2017 07:30) (60 - 79)  BP: 126/84 (26 Nov 2017 07:30) (126/84 - 163/91)  BP(mean): --  RR: 18 (26 Nov 2017 07:30) (17 - 18)  SpO2: 96% (26 Nov 2017 07:30) (96% - 97%)  Daily     Daily     PHYSICAL EXAM:  HEAD:  Atraumatic, Normocephalic  EYES: EOMI  NECK: Supple, No JVD  NERVOUS SYSTEM:  Alert & Oriented; intact and symmetric  CHEST/LUNG: Clear to percussion bilaterally  HEART: Regular rate and rhythm; No rub  ABDOMEN: Soft, Nontender, Nondistended; Bowel sounds present  EXTREMITIES:   No edema      LABS:    11-26    148<H>  |  114<H>  |  31.0<H>  ----------------------------<  145<H>  4.0   |  23.0  |  1.99<H>    Ca    10.2      26 Nov 2017 07:02                  RADIOLOGY & ADDITIONAL TESTS:

## 2017-11-26 NOTE — PROGRESS NOTE ADULT - ASSESSMENT
KOFI on CKD(III): DM and hx Li use (for 20 yrs now off Li) most likely etiologies  +chronic gama for (?) bladder dysfunction with no signs of obstruction on outpatient sono done 11/21/17.  No hydro on renal sonogram     Hypercalcemia with hx of parathyroid disease  Ca improving cont to watch labs on Sensipar  Follow up PTH     Hypernatremia -Cont hypotonic IVF hypotonic

## 2017-11-26 NOTE — PROGRESS NOTE ADULT - NSHPATTENDINGPLANDISCUSS_GEN_ALL_CORE
patient, Nursing staff, Urology
Patient, Kassiisng staff, Urology office, social Work/
patient, Nurisng staff

## 2017-11-26 NOTE — PROGRESS NOTE ADULT - SUBJECTIVE AND OBJECTIVE BOX
MASOUD ELLIS  ----------------------------------------  The patient was seen and evaluated for   The patient is in no acute distress.  Denied any chest pain, palpitations, dyspnea, or abdominal pain.    Vital Signs Last 24 Hrs  T(C): 36.9 (26 Nov 2017 06:07), Max: 37.1 (25 Nov 2017 17:02)  T(F): 98.4 (26 Nov 2017 06:07), Max: 98.7 (25 Nov 2017 17:02)  HR: 77 (26 Nov 2017 06:07) (60 - 79)  BP: 157/89 (26 Nov 2017 06:07) (157/89 - 163/91)  BP(mean): --  RR: 18 (26 Nov 2017 06:07) (17 - 18)  SpO2: 97% (26 Nov 2017 00:34) (97% - 97%)    CAPILLARY BLOOD GLUCOSE      POCT Blood Glucose.: 145 mg/dL (26 Nov 2017 07:31)  POCT Blood Glucose.: 267 mg/dL (25 Nov 2017 21:40)  POCT Blood Glucose.: 171 mg/dL (25 Nov 2017 17:28)  POCT Blood Glucose.: 223 mg/dL (25 Nov 2017 11:55)    PHYSICAL EXAMINATION:  ----------------------------------------    General appearance: NAD, Awake, Alert  HEENT: NCAT, Conjunctiva clear, EOMI  Neck: Supple, No JVD  Lungs: Clear to auscultation, Breath sound equal bilaterally  Cardiovascular: S1S2, Regular rhythm  Abdomen: Soft, Nontender, Positive bowel sounds  Extremities: No clubbing, No cyanosis, No edema  CNS: alert      LABORATORY STUDIES:  ----------------------------------------    11-26    148<H>  |  114<H>  |  31.0<H>  ----------------------------<  145<H>  4.0   |  23.0  |  1.99<H>    Ca    10.2      26 Nov 2017 07:02        MEDICATIONS  (STANDING):    atorvastatin 40 milliGRAM(s) Oral at bedtime  benztropine 1 milliGRAM(s) Oral two times a day  busPIRone 5 milliGRAM(s) Oral three times a day  cinacalcet 30 milliGRAM(s) Oral daily  dextrose 5%. 1000 milliLiter(s) (50 mL/Hr) IV Continuous <Continuous>  dextrose 5%. 1000 milliLiter(s) (50 mL/Hr) IV Continuous <Continuous>  dextrose 50% Injectable 12.5 Gram(s) IV Push once  dextrose 50% Injectable 25 Gram(s) IV Push once  dextrose 50% Injectable 25 Gram(s) IV Push once  heparin  Injectable 5000 Unit(s) SubCutaneous every 8 hours  insulin glargine Injectable (LANTUS) 22 Unit(s) SubCutaneous at bedtime  insulin lispro (HumaLOG) corrective regimen sliding scale   SubCutaneous three times a day before meals  levothyroxine 75 MICROGram(s) Oral daily  pantoprazole    Tablet 40 milliGRAM(s) Oral before breakfast  propranolol 20 milliGRAM(s) Oral two times a day  QUEtiapine 100 milliGRAM(s) Oral at bedtime  QUEtiapine 25 milliGRAM(s) Oral <User Schedule>  sodium chloride 0.225%. 1000 milliLiter(s) (80 mL/Hr) IV Continuous <Continuous>  sucralfate 1 Gram(s) Oral Before meals and at bedtime  tamsulosin 0.4 milliGRAM(s) Oral at bedtime  traZODone 100 milliGRAM(s) Oral at bedtime    MEDICATIONS  (PRN):  dextrose Gel 1 Dose(s) Oral once PRN Blood Glucose LESS THAN 70 milliGRAM(s)/deciliter  glucagon  Injectable 1 milliGRAM(s) IntraMuscular once PRN Glucose LESS THAN 70 milligrams/deciliter  haloperidol     Tablet 0.5 milliGRAM(s) Oral every 8 hours PRN agitation      ASSESSMENT / PLAN:  ---------------------------------------- MASOUD GIAVIVEK  ----------------------------------------    CC:  Follow up visit for Hypernatremia, KOFI    Failed voiding trial, had to put back gama  Sodium levels worsening again, re started D 5 W @ 50 cc/hr  very deconditioned, consulted PT/OT    Vital Signs Last 24 Hrs  T(C): 36.9 (26 Nov 2017 06:07), Max: 37.1 (25 Nov 2017 17:02)  T(F): 98.4 (26 Nov 2017 06:07), Max: 98.7 (25 Nov 2017 17:02)  HR: 77 (26 Nov 2017 06:07) (60 - 79)  BP: 157/89 (26 Nov 2017 06:07) (157/89 - 163/91)  BP(mean): --  RR: 18 (26 Nov 2017 06:07) (17 - 18)  SpO2: 97% (26 Nov 2017 00:34) (97% - 97%)    CAPILLARY BLOOD GLUCOSE      POCT Blood Glucose.: 145 mg/dL (26 Nov 2017 07:31)  POCT Blood Glucose.: 267 mg/dL (25 Nov 2017 21:40)  POCT Blood Glucose.: 171 mg/dL (25 Nov 2017 17:28)  POCT Blood Glucose.: 223 mg/dL (25 Nov 2017 11:55)    PHYSICAL EXAMINATION:  ----------------------------------------    General appearance: NAD, Awake, Alert  HEENT: NCAT, Conjunctiva clear, EOMI  Neck: Supple, No JVD  Lungs: Clear to auscultation, Breath sound equal bilaterally  Cardiovascular: S1S2, Regular rhythm  Abdomen: Soft, Nontender, Positive bowel sounds  Extremities: No clubbing, No cyanosis, No edema  + gama  CNS: alert AND Oriented times 2      LABORATORY STUDIES:  ----------------------------------------    11-26    148<H>  |  114<H>  |  31.0<H>  ----------------------------<  145<H>  4.0   |  23.0  |  1.99<H>    Ca    10.2      26 Nov 2017 07:02        MEDICATIONS  (STANDING):    atorvastatin 40 milliGRAM(s) Oral at bedtime  benztropine 1 milliGRAM(s) Oral two times a day  busPIRone 5 milliGRAM(s) Oral three times a day  cinacalcet 30 milliGRAM(s) Oral daily  dextrose 5%. 1000 milliLiter(s) (50 mL/Hr) IV Continuous <Continuous>  dextrose 5%. 1000 milliLiter(s) (50 mL/Hr) IV Continuous <Continuous>  dextrose 50% Injectable 12.5 Gram(s) IV Push once  dextrose 50% Injectable 25 Gram(s) IV Push once  dextrose 50% Injectable 25 Gram(s) IV Push once  heparin  Injectable 5000 Unit(s) SubCutaneous every 8 hours  insulin glargine Injectable (LANTUS) 22 Unit(s) SubCutaneous at bedtime  insulin lispro (HumaLOG) corrective regimen sliding scale   SubCutaneous three times a day before meals  levothyroxine 75 MICROGram(s) Oral daily  pantoprazole    Tablet 40 milliGRAM(s) Oral before breakfast  propranolol 20 milliGRAM(s) Oral two times a day  QUEtiapine 100 milliGRAM(s) Oral at bedtime  QUEtiapine 25 milliGRAM(s) Oral <User Schedule>  sodium chloride 0.225%. 1000 milliLiter(s) (80 mL/Hr) IV Continuous <Continuous>  sucralfate 1 Gram(s) Oral Before meals and at bedtime  tamsulosin 0.4 milliGRAM(s) Oral at bedtime  traZODone 100 milliGRAM(s) Oral at bedtime    MEDICATIONS  (PRN):  dextrose Gel 1 Dose(s) Oral once PRN Blood Glucose LESS THAN 70 milliGRAM(s)/deciliter  glucagon  Injectable 1 milliGRAM(s) IntraMuscular once PRN Glucose LESS THAN 70 milligrams/deciliter  haloperidol     Tablet 0.5 milliGRAM(s) Oral every 8 hours PRN agitation      ASSESSMENT / PLAN:  ----------------------------------------

## 2017-11-27 LAB
ANION GAP SERPL CALC-SCNC: 14 MMOL/L — SIGNIFICANT CHANGE UP (ref 5–17)
BUN SERPL-MCNC: 30 MG/DL — HIGH (ref 8–20)
CALCIUM SERPL-MCNC: 10.6 MG/DL — HIGH (ref 8.6–10.2)
CHLORIDE SERPL-SCNC: 112 MMOL/L — HIGH (ref 98–107)
CO2 SERPL-SCNC: 22 MMOL/L — SIGNIFICANT CHANGE UP (ref 22–29)
CREAT SERPL-MCNC: 1.91 MG/DL — HIGH (ref 0.5–1.3)
GLUCOSE BLDC GLUCOMTR-MCNC: 136 MG/DL — HIGH (ref 70–99)
GLUCOSE BLDC GLUCOMTR-MCNC: 142 MG/DL — HIGH (ref 70–99)
GLUCOSE BLDC GLUCOMTR-MCNC: 166 MG/DL — HIGH (ref 70–99)
GLUCOSE BLDC GLUCOMTR-MCNC: 226 MG/DL — HIGH (ref 70–99)
GLUCOSE BLDC GLUCOMTR-MCNC: 308 MG/DL — HIGH (ref 70–99)
GLUCOSE SERPL-MCNC: 144 MG/DL — HIGH (ref 70–115)
POTASSIUM SERPL-MCNC: 3.4 MMOL/L — LOW (ref 3.5–5.3)
POTASSIUM SERPL-SCNC: 3.4 MMOL/L — LOW (ref 3.5–5.3)
SODIUM SERPL-SCNC: 148 MMOL/L — HIGH (ref 135–145)

## 2017-11-27 PROCEDURE — 99232 SBSQ HOSP IP/OBS MODERATE 35: CPT

## 2017-11-27 RX ORDER — SODIUM CHLORIDE 9 MG/ML
1000 INJECTION, SOLUTION INTRAVENOUS
Qty: 0 | Refills: 0 | Status: DISCONTINUED | OUTPATIENT
Start: 2017-11-27 | End: 2017-11-28

## 2017-11-27 RX ORDER — POTASSIUM CHLORIDE 20 MEQ
20 PACKET (EA) ORAL ONCE
Qty: 0 | Refills: 0 | Status: COMPLETED | OUTPATIENT
Start: 2017-11-27 | End: 2017-11-27

## 2017-11-27 RX ADMIN — Medication 1 GRAM(S): at 21:27

## 2017-11-27 RX ADMIN — HEPARIN SODIUM 5000 UNIT(S): 5000 INJECTION INTRAVENOUS; SUBCUTANEOUS at 14:03

## 2017-11-27 RX ADMIN — Medication 20 MILLIGRAM(S): at 07:06

## 2017-11-27 RX ADMIN — TAMSULOSIN HYDROCHLORIDE 0.4 MILLIGRAM(S): 0.4 CAPSULE ORAL at 21:29

## 2017-11-27 RX ADMIN — QUETIAPINE FUMARATE 25 MILLIGRAM(S): 200 TABLET, FILM COATED ORAL at 12:25

## 2017-11-27 RX ADMIN — Medication 5 MILLIGRAM(S): at 07:06

## 2017-11-27 RX ADMIN — Medication 100 MILLIGRAM(S): at 21:29

## 2017-11-27 RX ADMIN — INSULIN GLARGINE 22 UNIT(S): 100 INJECTION, SOLUTION SUBCUTANEOUS at 21:42

## 2017-11-27 RX ADMIN — Medication 1 MILLIGRAM(S): at 17:02

## 2017-11-27 RX ADMIN — Medication 1 GRAM(S): at 07:06

## 2017-11-27 RX ADMIN — ATORVASTATIN CALCIUM 40 MILLIGRAM(S): 80 TABLET, FILM COATED ORAL at 21:29

## 2017-11-27 RX ADMIN — Medication 1 MILLIGRAM(S): at 07:06

## 2017-11-27 RX ADMIN — QUETIAPINE FUMARATE 100 MILLIGRAM(S): 200 TABLET, FILM COATED ORAL at 21:28

## 2017-11-27 RX ADMIN — PANTOPRAZOLE SODIUM 40 MILLIGRAM(S): 20 TABLET, DELAYED RELEASE ORAL at 07:06

## 2017-11-27 RX ADMIN — Medication 5 MILLIGRAM(S): at 14:04

## 2017-11-27 RX ADMIN — SODIUM CHLORIDE 75 MILLILITER(S): 9 INJECTION, SOLUTION INTRAVENOUS at 12:26

## 2017-11-27 RX ADMIN — HEPARIN SODIUM 5000 UNIT(S): 5000 INJECTION INTRAVENOUS; SUBCUTANEOUS at 21:29

## 2017-11-27 RX ADMIN — Medication 20 MILLIEQUIVALENT(S): at 17:08

## 2017-11-27 RX ADMIN — Medication 5 MILLIGRAM(S): at 21:29

## 2017-11-27 RX ADMIN — Medication 4: at 12:25

## 2017-11-27 RX ADMIN — Medication 1 GRAM(S): at 17:02

## 2017-11-27 RX ADMIN — Medication 1 GRAM(S): at 12:25

## 2017-11-27 RX ADMIN — HEPARIN SODIUM 5000 UNIT(S): 5000 INJECTION INTRAVENOUS; SUBCUTANEOUS at 07:06

## 2017-11-27 RX ADMIN — Medication 20 MILLIGRAM(S): at 17:02

## 2017-11-27 RX ADMIN — QUETIAPINE FUMARATE 25 MILLIGRAM(S): 200 TABLET, FILM COATED ORAL at 08:11

## 2017-11-27 RX ADMIN — Medication 75 MICROGRAM(S): at 07:06

## 2017-11-27 RX ADMIN — CINACALCET 30 MILLIGRAM(S): 30 TABLET, FILM COATED ORAL at 12:25

## 2017-11-27 RX ADMIN — Medication 8: at 17:00

## 2017-11-27 RX ADMIN — SODIUM CHLORIDE 75 MILLILITER(S): 9 INJECTION, SOLUTION INTRAVENOUS at 21:27

## 2017-11-27 NOTE — PHYSICAL THERAPY INITIAL EVALUATION ADULT - BED MOBILITY LIMITATIONS, REHAB EVAL
supervision 2*2 cognitive impairment, pt pulling at gama line, attempting to transfer OOB without assist despite balance deficits

## 2017-11-27 NOTE — PROGRESS NOTE ADULT - SUBJECTIVE AND OBJECTIVE BOX
MASOUD ELLIS  ----------------------------------------  The patient was seen and evaluated for   The patient is in no acute distress.  Denied any chest pain, palpitations, dyspnea, or abdominal pain.    Vital Signs Last 24 Hrs  T(C): 36.6 (27 Nov 2017 08:17), Max: 36.9 (26 Nov 2017 16:29)  T(F): 97.8 (27 Nov 2017 08:17), Max: 98.5 (26 Nov 2017 16:29)  HR: 86 (27 Nov 2017 08:17) (80 - 86)  BP: 161/88 (27 Nov 2017 08:17) (121/75 - 161/88)  BP(mean): --  RR: 18 (27 Nov 2017 08:17) (18 - 18)  SpO2: 95% (27 Nov 2017 08:17) (95% - 96%)    CAPILLARY BLOOD GLUCOSE  136 (27 Nov 2017 07:09)      POCT Blood Glucose.: 166 mg/dL (27 Nov 2017 08:04)  POCT Blood Glucose.: 136 mg/dL (27 Nov 2017 07:08)  POCT Blood Glucose.: 132 mg/dL (26 Nov 2017 23:22)  POCT Blood Glucose.: 171 mg/dL (26 Nov 2017 16:34)  POCT Blood Glucose.: 154 mg/dL (26 Nov 2017 11:28)    PHYSICAL EXAMINATION:  ----------------------------------------      LABORATORY STUDIES:  ----------------------------------------    11-27    148<H>  |  112<H>  |  30.0<H>  ----------------------------<  144<H>  3.4<L>   |  22.0  |  1.91<H>    Ca    10.6<H>      27 Nov 2017 08:54                        MEDICATIONS  (STANDING):  atorvastatin 40 milliGRAM(s) Oral at bedtime  benztropine 1 milliGRAM(s) Oral two times a day  busPIRone 5 milliGRAM(s) Oral three times a day  cinacalcet 30 milliGRAM(s) Oral daily  dextrose 5%. 1000 milliLiter(s) (50 mL/Hr) IV Continuous <Continuous>  dextrose 5%. 1000 milliLiter(s) (75 mL/Hr) IV Continuous <Continuous>  dextrose 50% Injectable 12.5 Gram(s) IV Push once  dextrose 50% Injectable 25 Gram(s) IV Push once  dextrose 50% Injectable 25 Gram(s) IV Push once  heparin  Injectable 5000 Unit(s) SubCutaneous every 8 hours  insulin glargine Injectable (LANTUS) 22 Unit(s) SubCutaneous at bedtime  insulin lispro (HumaLOG) corrective regimen sliding scale   SubCutaneous three times a day before meals  levothyroxine 75 MICROGram(s) Oral daily  pantoprazole    Tablet 40 milliGRAM(s) Oral before breakfast  potassium chloride   Powder 20 milliEquivalent(s) Oral once  propranolol 20 milliGRAM(s) Oral two times a day  QUEtiapine 100 milliGRAM(s) Oral at bedtime  QUEtiapine 25 milliGRAM(s) Oral <User Schedule>  sodium chloride 0.225%. 1000 milliLiter(s) (80 mL/Hr) IV Continuous <Continuous>  sucralfate 1 Gram(s) Oral Before meals and at bedtime  tamsulosin 0.4 milliGRAM(s) Oral at bedtime  traZODone 100 milliGRAM(s) Oral at bedtime    MEDICATIONS  (PRN):  dextrose Gel 1 Dose(s) Oral once PRN Blood Glucose LESS THAN 70 milliGRAM(s)/deciliter  glucagon  Injectable 1 milliGRAM(s) IntraMuscular once PRN Glucose LESS THAN 70 milligrams/deciliter  haloperidol     Tablet 0.5 milliGRAM(s) Oral every 8 hours PRN agitation      ASSESSMENT / PLAN:  ---------------------------------------- MASOUD GIAVIVEK  ----------------------------------------  CC:  FOLLOW up visit for Hypernatremia, KOFI and Urinary retention    Hypernatremia worse, and so re started D 5 W and Increased fluids to 75 cc/hr      Vital Signs Last 24 Hrs    T(C): 36.6 (27 Nov 2017 08:17), Max: 36.9 (26 Nov 2017 16:29)  T(F): 97.8 (27 Nov 2017 08:17), Max: 98.5 (26 Nov 2017 16:29)  HR: 86 (27 Nov 2017 08:17) (80 - 86)  BP: 161/88 (27 Nov 2017 08:17) (121/75 - 161/88)  BP(mean): --  RR: 18 (27 Nov 2017 08:17) (18 - 18)  SpO2: 95% (27 Nov 2017 08:17) (95% - 96%)    CAPILLARY BLOOD GLUCOSE  136 (27 Nov 2017 07:09)      POCT Blood Glucose.: 166 mg/dL (27 Nov 2017 08:04)  POCT Blood Glucose.: 136 mg/dL (27 Nov 2017 07:08)  POCT Blood Glucose.: 132 mg/dL (26 Nov 2017 23:22)  POCT Blood Glucose.: 171 mg/dL (26 Nov 2017 16:34)  POCT Blood Glucose.: 154 mg/dL (26 Nov 2017 11:28)    PHYSICAL EXAMINATION:  ----------------------------------------    General appearance: NAD, Awake, Alert  HEENT: NCAT, Conjunctiva clear, EOMI  Neck: Supple, No JVD  Lungs: Clear to auscultation, Breath sound equal bilaterally  Cardiovascular: S1S2, Regular rhythm  Abdomen: Soft, Nontender, Positive bowel sounds  Extremities: No clubbing, No cyanosis, No edema  + urinary retention  CNS: alert      LABORATORY STUDIES:  ----------------------------------------    11-27    148<H>  |  112<H>  |  30.0<H>  ----------------------------<  144<H>  3.4<L>   |  22.0  |  1.91<H>    Ca    10.6<H>      27 Nov 2017 08:54      MEDICATIONS  (STANDING):    atorvastatin 40 milliGRAM(s) Oral at bedtime  benztropine 1 milliGRAM(s) Oral two times a day  busPIRone 5 milliGRAM(s) Oral three times a day  cinacalcet 30 milliGRAM(s) Oral daily  dextrose 5%. 1000 milliLiter(s) (50 mL/Hr) IV Continuous <Continuous>  dextrose 5%. 1000 milliLiter(s) (75 mL/Hr) IV Continuous <Continuous>  dextrose 50% Injectable 12.5 Gram(s) IV Push once  dextrose 50% Injectable 25 Gram(s) IV Push once  dextrose 50% Injectable 25 Gram(s) IV Push once  heparin  Injectable 5000 Unit(s) SubCutaneous every 8 hours  insulin glargine Injectable (LANTUS) 22 Unit(s) SubCutaneous at bedtime  insulin lispro (HumaLOG) corrective regimen sliding scale   SubCutaneous three times a day before meals  levothyroxine 75 MICROGram(s) Oral daily  pantoprazole    Tablet 40 milliGRAM(s) Oral before breakfast  potassium chloride   Powder 20 milliEquivalent(s) Oral once  propranolol 20 milliGRAM(s) Oral two times a day  QUEtiapine 100 milliGRAM(s) Oral at bedtime  QUEtiapine 25 milliGRAM(s) Oral <User Schedule>  sodium chloride 0.225%. 1000 milliLiter(s) (80 mL/Hr) IV Continuous <Continuous>  sucralfate 1 Gram(s) Oral Before meals and at bedtime  tamsulosin 0.4 milliGRAM(s) Oral at bedtime  traZODone 100 milliGRAM(s) Oral at bedtime    MEDICATIONS  (PRN):  dextrose Gel 1 Dose(s) Oral once PRN Blood Glucose LESS THAN 70 milliGRAM(s)/deciliter  glucagon  Injectable 1 milliGRAM(s) IntraMuscular once PRN Glucose LESS THAN 70 milligrams/deciliter  haloperidol     Tablet 0.5 milliGRAM(s) Oral every 8 hours PRN agitation      ASSESSMENT / PLAN:  ----------------------------------------

## 2017-11-27 NOTE — PHYSICAL THERAPY INITIAL EVALUATION ADULT - PATIENT/FAMILY/SIGNIFICANT OTHER GOALS STATEMENT, PT EVAL
68 yo  man  with PMH of DM, HTN, psychiatric disease on Lithium for "years", prostate disease s/p surgery, chronic indwelling gama for "nonfunctioning bladder", parathyroid disease s/p resection and CRF who was following a nephrologist in [place] but was moved to [facility] in the recent past, admitted for worsening renal function.

## 2017-11-27 NOTE — PHYSICAL THERAPY INITIAL EVALUATION ADULT - LEVEL OF INDEPENDENCE: STAIR NEGOTIATION, REHAB EVAL
2*2 poor balance and need for verbal cues to maintain safety, pt impulsive at times./minimum assist (75% patients effort)

## 2017-11-27 NOTE — PHYSICAL THERAPY INITIAL EVALUATION ADULT - IMPAIRMENTS FOUND, PT EVAL
cognitive impairment/muscle strength/posture/poor safety awareness/aerobic capacity/endurance/gait, locomotion, and balance

## 2017-11-27 NOTE — PHYSICAL THERAPY INITIAL EVALUATION ADULT - GENERAL OBSERVATIONS, REHAB EVAL
Pt received supine in bed, + gama, + bed alarm, no c/o pain, pt A&O x2, unclear on situation, pt agreeable to PT

## 2017-11-27 NOTE — PHYSICAL THERAPY INITIAL EVALUATION ADULT - ADDITIONAL COMMENTS
Pt is a poor historian, Pt states he lives alone, walks with a walker "sometimes", has "a few" steps to enter with handrails, unable to state if he has stairs inside. Pt verbalized he will stay with his daugher "Naz" upon d/c. As per H&P, pt is a resident at [facility].

## 2017-11-27 NOTE — PROGRESS NOTE ADULT - ASSESSMENT
This is a 68 yo  man  with PMH of DM, HTN, psychiatric disease on Lithium for "years", prostate disease s/p surgery, chronic indwelling gama for "nonfunctioning bladder", parathyroid disease s/p resection and CRF who was following a nephrologist in [place] but was moved to [facility] in the recent past, admitted for worsening renal function. Pt report diarrhea coupe of days ago, loose stool, resolved now, also report poor oral intake. Labs here showed BUN 46, Cr2.80, Sodium 150, calcium 10.4    A/P    >Acute on CKD -3- Pre renal,    due to dehydration   Re started fluids D 5 W @ 50 CC/HR  Renal USG : no hydronephrosis  Nephrology consulted,     Hypernatremia due to dehydration   restart  D 5 W @ 50 CC/HR for 24 hrs  encouraged free water intake  repeat BMP in am    Chronic Gama : for Urinary retention   changed Gama 11/25  USG: No hydronephrosis    Essential HTN - normotensive   continue home medications     >Bipolar disorder and other psych comorbidities- c/w home medication     Type 2 DM with hyperglycemia :  c/w Lantus and sliding scale   A1c : 5.6    Deconditioning : consult PT/OT;     >DVT Prophylaxis - SQ heparin This is a 68 yo  man  with PMH of DM, HTN, psychiatric disease on Lithium for "years", prostate disease s/p surgery, chronic indwelling gama for "nonfunctioning bladder", parathyroid disease s/p resection and CRF who was following a nephrologist in [place] but was moved to [facility] in the recent past, admitted for worsening renal function. Pt report diarrhea coupe of days ago, loose stool, resolved now, also report poor oral intake. Labs here showed BUN 46, Cr2.80, Sodium 150, calcium 10.4    A/P    >Acute on CKD -3- Pre renal, improving   fluids D 5 W @ 75 CC/HR  Renal USG : no hydronephrosis  Nephrology consulted,     Hypernatremia due to dehydration   Increased D 5 W @ 50 CC/HR to 75 cc/hr  encouraged free water intake  repeat BMP in am    Chronic Gama : for Urinary retention   changed Gama 11/25  USG: No hydronephrosis    Essential HTN - normotensive   continue home medications     >Bipolar disorder and other psych comorbidities- c/w home medication     Type 2 DM with hyperglycemia :  c/w Lantus and sliding scale   A1c : 5.6    Deconditioning : consulted PT/OT; Recommended sub acute rehab    >DVT Prophylaxis - SQ heparin     Anticipate D/C back to Sub acute Rehab tuesday/wednesday ( [facility] )

## 2017-11-28 ENCOUNTER — TRANSCRIPTION ENCOUNTER (OUTPATIENT)
Age: 67
End: 2017-11-28

## 2017-11-28 VITALS
SYSTOLIC BLOOD PRESSURE: 153 MMHG | HEART RATE: 96 BPM | RESPIRATION RATE: 18 BRPM | TEMPERATURE: 98 F | OXYGEN SATURATION: 99 % | DIASTOLIC BLOOD PRESSURE: 86 MMHG

## 2017-11-28 LAB
ANION GAP SERPL CALC-SCNC: 12 MMOL/L — SIGNIFICANT CHANGE UP (ref 5–17)
BUN SERPL-MCNC: 28 MG/DL — HIGH (ref 8–20)
CALCIUM SERPL-MCNC: 10.6 MG/DL — HIGH (ref 8.6–10.2)
CHLORIDE SERPL-SCNC: 113 MMOL/L — HIGH (ref 98–107)
CO2 SERPL-SCNC: 21 MMOL/L — LOW (ref 22–29)
CREAT SERPL-MCNC: 1.69 MG/DL — HIGH (ref 0.5–1.3)
GLUCOSE BLDC GLUCOMTR-MCNC: 127 MG/DL — HIGH (ref 70–99)
GLUCOSE BLDC GLUCOMTR-MCNC: 183 MG/DL — HIGH (ref 70–99)
GLUCOSE SERPL-MCNC: 137 MG/DL — HIGH (ref 70–115)
POTASSIUM SERPL-MCNC: 3.6 MMOL/L — SIGNIFICANT CHANGE UP (ref 3.5–5.3)
POTASSIUM SERPL-SCNC: 3.6 MMOL/L — SIGNIFICANT CHANGE UP (ref 3.5–5.3)
SODIUM SERPL-SCNC: 146 MMOL/L — HIGH (ref 135–145)

## 2017-11-28 PROCEDURE — 82310 ASSAY OF CALCIUM: CPT

## 2017-11-28 PROCEDURE — 81001 URINALYSIS AUTO W/SCOPE: CPT

## 2017-11-28 PROCEDURE — 76775 US EXAM ABDO BACK WALL LIM: CPT

## 2017-11-28 PROCEDURE — 83970 ASSAY OF PARATHORMONE: CPT

## 2017-11-28 PROCEDURE — 99239 HOSP IP/OBS DSCHRG MGMT >30: CPT

## 2017-11-28 PROCEDURE — 83036 HEMOGLOBIN GLYCOSYLATED A1C: CPT

## 2017-11-28 PROCEDURE — 93005 ELECTROCARDIOGRAM TRACING: CPT

## 2017-11-28 PROCEDURE — 80053 COMPREHEN METABOLIC PANEL: CPT

## 2017-11-28 PROCEDURE — 87086 URINE CULTURE/COLONY COUNT: CPT

## 2017-11-28 PROCEDURE — 85610 PROTHROMBIN TIME: CPT

## 2017-11-28 PROCEDURE — 80048 BASIC METABOLIC PNL TOTAL CA: CPT

## 2017-11-28 PROCEDURE — 97163 PT EVAL HIGH COMPLEX 45 MIN: CPT

## 2017-11-28 PROCEDURE — 87186 SC STD MICRODIL/AGAR DIL: CPT

## 2017-11-28 PROCEDURE — 85730 THROMBOPLASTIN TIME PARTIAL: CPT

## 2017-11-28 PROCEDURE — 85027 COMPLETE CBC AUTOMATED: CPT

## 2017-11-28 PROCEDURE — 83735 ASSAY OF MAGNESIUM: CPT

## 2017-11-28 PROCEDURE — 99285 EMERGENCY DEPT VISIT HI MDM: CPT | Mod: 25

## 2017-11-28 PROCEDURE — 84100 ASSAY OF PHOSPHORUS: CPT

## 2017-11-28 PROCEDURE — 82962 GLUCOSE BLOOD TEST: CPT

## 2017-11-28 PROCEDURE — 84443 ASSAY THYROID STIM HORMONE: CPT

## 2017-11-28 PROCEDURE — 36415 COLL VENOUS BLD VENIPUNCTURE: CPT

## 2017-11-28 PROCEDURE — 80061 LIPID PANEL: CPT

## 2017-11-28 RX ORDER — HYDRALAZINE HCL 50 MG
10 TABLET ORAL ONCE
Qty: 0 | Refills: 0 | Status: COMPLETED | OUTPATIENT
Start: 2017-11-28 | End: 2017-11-28

## 2017-11-28 RX ORDER — AMLODIPINE BESYLATE 2.5 MG/1
1 TABLET ORAL
Qty: 0 | Refills: 0 | COMMUNITY

## 2017-11-28 RX ADMIN — Medication 1 MILLIGRAM(S): at 05:37

## 2017-11-28 RX ADMIN — Medication 10 MILLIGRAM(S): at 01:43

## 2017-11-28 RX ADMIN — HEPARIN SODIUM 5000 UNIT(S): 5000 INJECTION INTRAVENOUS; SUBCUTANEOUS at 05:37

## 2017-11-28 RX ADMIN — HALOPERIDOL DECANOATE 0.5 MILLIGRAM(S): 100 INJECTION INTRAMUSCULAR at 14:36

## 2017-11-28 RX ADMIN — CINACALCET 30 MILLIGRAM(S): 30 TABLET, FILM COATED ORAL at 13:09

## 2017-11-28 RX ADMIN — Medication 5 MILLIGRAM(S): at 05:37

## 2017-11-28 RX ADMIN — HEPARIN SODIUM 5000 UNIT(S): 5000 INJECTION INTRAVENOUS; SUBCUTANEOUS at 13:09

## 2017-11-28 RX ADMIN — SODIUM CHLORIDE 75 MILLILITER(S): 9 INJECTION, SOLUTION INTRAVENOUS at 08:37

## 2017-11-28 RX ADMIN — QUETIAPINE FUMARATE 25 MILLIGRAM(S): 200 TABLET, FILM COATED ORAL at 08:26

## 2017-11-28 RX ADMIN — Medication 5 MILLIGRAM(S): at 13:09

## 2017-11-28 RX ADMIN — Medication 1 GRAM(S): at 11:53

## 2017-11-28 RX ADMIN — Medication 20 MILLIGRAM(S): at 05:36

## 2017-11-28 RX ADMIN — PANTOPRAZOLE SODIUM 40 MILLIGRAM(S): 20 TABLET, DELAYED RELEASE ORAL at 05:38

## 2017-11-28 RX ADMIN — Medication 1 GRAM(S): at 05:37

## 2017-11-28 RX ADMIN — Medication 75 MICROGRAM(S): at 05:37

## 2017-11-28 RX ADMIN — QUETIAPINE FUMARATE 25 MILLIGRAM(S): 200 TABLET, FILM COATED ORAL at 11:53

## 2017-11-28 RX ADMIN — Medication 2: at 11:52

## 2017-11-28 NOTE — PROGRESS NOTE ADULT - PROVIDER SPECIALTY LIST ADULT
Hospitalist
Nephrology
Urology
Hospitalist

## 2017-11-28 NOTE — DISCHARGE NOTE ADULT - SECONDARY DIAGNOSIS.
Hypernatremia Essential hypertension Bipolar 1 disorder Urinary retention Type 2 diabetes mellitus with hyperglycemia, with long-term current use of insulin

## 2017-11-28 NOTE — DISCHARGE NOTE ADULT - MEDICATION SUMMARY - MEDICATIONS TO TAKE
I will START or STAY ON the medications listed below when I get home from the hospital:    Flomax 0.4 mg oral capsule  -- 1 cap(s) by mouth once a day  -- Indication: For BPH    propranolol 20 mg oral tablet  -- 1 tab(s) by mouth 2 times a day  -- Indication: For HTN (hypertension)    traZODone 100 mg oral tablet  -- 1 tab(s) by mouth once a day (at bedtime)  -- Indication: For INSOMNIA    Lantus 100 units/mL subcutaneous solution  -- 22 unit(s) subcutaneous once a day (at bedtime)  -- Indication: For DM    Lipitor 40 mg oral tablet  -- 1 tab(s) by mouth once a day  -- Indication: For DYSLIPIDEMIA    benztropine 1 mg oral tablet  -- 1 tab(s) by mouth 2 times a day  -- Indication: For Bipolar 1 disorder    SEROquel 100 mg oral tablet  -- 1 tab(s) by mouth once a day (at bedtime)  -- Indication: For Bipolar 1 disorder    SEROquel 25 mg oral tablet  -- 1 tab(s) by mouth 2 times a day  -- Indication: For Bipolar 1 disorder    busPIRone 5 mg oral tablet  -- 1 tab(s) by mouth 3 times a day  -- Indication: For GENERALIZED ANXIETY DISORDER    Sensipar 30 mg oral tablet  -- 1 tab(s) by mouth once a day  -- Indication: For HyperPARATHYROIDISM    Pyridium 100 mg oral tablet  -- 1 tab(s) by mouth 3 times a day (after meals)  -- Indication: For UTI    Carafate 1 g oral tablet  -- 1 tab(s) by mouth 4 times a day (before meals and at bedtime)  -- Indication: For PUD    Protonix 40 mg oral delayed release tablet  -- 1 tab(s) by mouth once a day  -- Indication: For PUD    levothyroxine 75 mcg (0.075 mg) oral tablet  -- 1 tab(s) by mouth once a day  -- Indication: For HypOTHYROIDISM

## 2017-11-28 NOTE — DISCHARGE NOTE ADULT - HOSPITAL COURSE
· Assessment	  This is a 68 yo  man  with PMH of DM, HTN, psychiatric disease on Lithium for "years", prostate disease s/p surgery, chronic indwelling gama for "nonfunctioning bladder", parathyroid disease s/p resection and CRF who was following a nephrologist in [place] but was moved to [facility] in the recent past, admitted for worsening renal function. Pt report diarrhea coupe of days ago, loose stool, resolved now, also report poor oral intake. Labs here showed BUN 46, Cr2.80, Sodium 150, calcium 10.4    A/P    >Acute on CKD -3- Pre renal, improving   fluids D 5 W @ 75 CC/HR  Renal USG : no hydronephrosis  Nephrology consulted,     Hypernatremia due to dehydration   Increased D 5 W @ 50 CC/HR to 75 cc/hr  encouraged free water intake  repeat BMP in am    Chronic Gama : for Urinary retention   changed Gama 11/25  USG: No hydronephrosis    Essential HTN - normotensive   continue home medications     >Bipolar disorder and other psych comorbidities- c/w home medication     Type 2 DM with hyperglycemia :  c/w Lantus and sliding scale   A1c : 5.6    Deconditioning : consulted PT/OT; Recommended sub acute rehab    >DVT Prophylaxis - SQ heparin     Anticipate D/C back to Sub acute Rehab tuesday/wednesday ( [facility] ) This is a 68 yo  man  with PMH of DM, HTN, psychiatric disease on Lithium for "years", prostate disease s/p surgery, chronic indwelling gama for "nonfunctioning bladder" and Urinary retention, parathyroid disease s/p resection and CRF who was following a nephrologist in [place] but was moved to [facility] in the recent past, admitted for worsening renal function. Pt report diarrhea coupe of days ago, loose stool, resolved now, also report poor oral intake. Labs here showed BUN 46, Cr2.80, Sodium 150, calcium 10.4 and subsequently [facility] to [facility].      Acute on CKD -3- Pre renal, improved with fluids. Creatinine back to base line   Renal USG : no hydronephrosis  Nephrology consulted,   recommended patient drink free water to prevent from dehydration and recurrent worsening of renal functions  recommended check BMP in 4 days at Nursing home to make sure, renal functions stable    Hypernatremia due to dehydration   started fluids D 5 W @ 50 CC/HR to 75 cc/hr  encouraged free water intake  serum sodium levels improved to 146 on the day of D/C  Nephrology team was also consulted to assist us with Rx of Hypernatremia    Chronic Gama : for Urinary retention   changed Gama 11/25  USG: No hydronephrosis  continue Gama  Urine Cx: pseudomonas contaminant and not true infection. We have changed Gama and so no indication of Abx. Pr remained afebrile and normal wbc counts    Essential HTN - normotensive   continue home medications except Norvasc, which was stopped, as pt's BP well controlled with out Norvasc    Bipolar disorder and other psych comorbidities- c/w home medication, seroquel    Type 2 DM with hyperglycemia, with Long term use of Insulin:  c/w Lantus and sliding scale   A1c : 5.6  takes Lantus 22 U SQ Q HS. Continued his home Medications    Deconditioning : consulted PT/OT; Recommended sub acute rehab     D/C back to Sub acute Rehab [facility], with Assistance from  and case management    Total time spent 45 Minutes, in discharge, more than 50 % time spent in counseling/cor dination of patient's care

## 2017-11-28 NOTE — DISCHARGE NOTE ADULT - MEDICATION SUMMARY - MEDICATIONS TO STOP TAKING
I will STOP taking the medications listed below when I get home from the hospital:    Norvasc 10 mg oral tablet  -- 1 tab(s) by mouth once a day

## 2017-11-28 NOTE — DISCHARGE NOTE ADULT - ADDITIONAL INSTRUCTIONS
1) continue Hardin catheter and change as per Nursing protocol regularly to prevent from recurrent UTIs ( has Hardin for urinary retention )  2) Follow up BMP at nursing home in 4 days and call Physician with results. Please encourage him to drink free water   3) Please follow up with primary MD at Nursing home

## 2017-11-28 NOTE — DISCHARGE NOTE ADULT - PROVIDER TOKENS
FREE:[LAST:[PCP],PHONE:[(   )    -],FAX:[(   )    -],ADDRESS:[Please follow up Primary MD at [facility] and check BMP]]

## 2017-11-28 NOTE — DISCHARGE NOTE ADULT - PATIENT PORTAL LINK FT
“You can access the Follow[facility] Patient Portal, offered by [facility] [place], by registering with the following website: [email or web address]”

## 2017-11-28 NOTE — PROGRESS NOTE ADULT - SUBJECTIVE AND OBJECTIVE BOX
Patient seen and examined    Feels fine, comfortable  no c/o CP SOB NV   No swelling feet    Vital Signs Last 24 Hrs  T(C): 36.7 (28 Nov 2017 13:21), Max: 36.7 (28 Nov 2017 00:33)  T(F): 98 (28 Nov 2017 13:21), Max: 98 (28 Nov 2017 00:33)  HR: 96 (28 Nov 2017 13:21) (51 - 96)  BP: 153/86 (28 Nov 2017 13:21) (153/86 - 190/75)  BP(mean): --  RR: 18 (28 Nov 2017 13:21) (18 - 18)  SpO2: 99% (28 Nov 2017 13:21) (96% - 99%)    PHYSICAL EXAM    GENERAL: NAD,   EYES:  conjunctiva and sclera clear  NECK: Supple, No JVD/Bruit  NERVOUS SYSTEM:  A/O x3,   CHEST:  CTA ,No rales or rhonchi  HEART:  RRR, No murmurs  ABDOMEN: Soft, NT/ND BS+  EXTREMITIES:  No Edema;  SKIN: No rashes    28 Nov 2017 07:58    146    |  113    |  28.0   ----------------------------<  137    3.6     |  21.0   |  1.69     Ca    10.6       28 Nov 2017 07:58          na better at 146- encourage POFs  creat better at 1.69  Lytes normal; watch Calcium levels  Continue present treatment

## 2017-11-28 NOTE — DISCHARGE NOTE ADULT - CARE PLAN
Principal Discharge DX:	KOFI (acute kidney injury)  Goal:	ENCOURAGED FREE WATER INTAKE  Instructions for follow-up, activity and diet:	Follow up BMP at nursing home in 4 days and call Physician with results  Secondary Diagnosis:	Hypernatremia  Instructions for follow-up, activity and diet:	ENCOURAGED FREE WATER INTAKE AND CHECK bmp in 4 days at Nursing homr  Secondary Diagnosis:	Essential hypertension  Secondary Diagnosis:	Bipolar 1 disorder  Secondary Diagnosis:	Urinary retention  Instructions for follow-up, activity and diet:	continue Hardin catheter and change as per Nursing protocol regularly to prevent from recurrent UTI's  Secondary Diagnosis:	Type 2 diabetes mellitus with hyperglycemia, with long-term current use of insulin

## 2017-11-28 NOTE — DISCHARGE NOTE ADULT - PLAN OF CARE
ENCOURAGED FREE WATER INTAKE Follow up BMP at nursing home in 4 days and call Physician with results ENCOURAGED FREE WATER INTAKE AND CHECK bmp in 4 days at Nursing homr continue Hardin catheter and change as per Nursing protocol regularly to prevent from recurrent UTI's

## 2017-12-20 ENCOUNTER — INPATIENT (INPATIENT)
Facility: HOSPITAL | Age: 67
LOS: 12 days | Discharge: HOME | DRG: 78 | End: 2018-01-02
Attending: HOSPITALIST | Admitting: GENERAL ACUTE CARE HOSPITAL
Payer: MEDICARE

## 2017-12-20 VITALS
OXYGEN SATURATION: 97 % | DIASTOLIC BLOOD PRESSURE: 91 MMHG | SYSTOLIC BLOOD PRESSURE: 192 MMHG | HEIGHT: 66 IN | TEMPERATURE: 98 F | WEIGHT: 138.01 LBS | RESPIRATION RATE: 16 BRPM | HEART RATE: 65 BPM

## 2017-12-20 DIAGNOSIS — R41.82 ALTERED MENTAL STATUS, UNSPECIFIED: ICD-10-CM

## 2017-12-20 DIAGNOSIS — Z90.79 ACQUIRED ABSENCE OF OTHER GENITAL ORGAN(S): Chronic | ICD-10-CM

## 2017-12-20 DIAGNOSIS — I16.0 HYPERTENSIVE URGENCY: ICD-10-CM

## 2017-12-20 DIAGNOSIS — I67.4 HYPERTENSIVE ENCEPHALOPATHY: ICD-10-CM

## 2017-12-20 DIAGNOSIS — N28.9 DISORDER OF KIDNEY AND URETER, UNSPECIFIED: ICD-10-CM

## 2017-12-20 PROBLEM — F31.9 BIPOLAR DISORDER, UNSPECIFIED: Chronic | Status: ACTIVE | Noted: 2017-11-22

## 2017-12-20 PROBLEM — R33.9 RETENTION OF URINE, UNSPECIFIED: Chronic | Status: ACTIVE | Noted: 2017-11-22

## 2017-12-20 LAB
ALBUMIN SERPL ELPH-MCNC: 3.2 G/DL — LOW (ref 3.3–5.2)
ALP SERPL-CCNC: 76 U/L — SIGNIFICANT CHANGE UP (ref 40–120)
ALT FLD-CCNC: 10 U/L — SIGNIFICANT CHANGE UP
ANION GAP SERPL CALC-SCNC: 15 MMOL/L — SIGNIFICANT CHANGE UP (ref 5–17)
APPEARANCE UR: CLEAR — SIGNIFICANT CHANGE UP
APTT BLD: 29.3 SEC — SIGNIFICANT CHANGE UP (ref 27.5–37.4)
AST SERPL-CCNC: 14 U/L — SIGNIFICANT CHANGE UP
BASOPHILS # BLD AUTO: 0 K/UL — SIGNIFICANT CHANGE UP (ref 0–0.2)
BASOPHILS NFR BLD AUTO: 0.2 % — SIGNIFICANT CHANGE UP (ref 0–2)
BILIRUB SERPL-MCNC: 0.4 MG/DL — SIGNIFICANT CHANGE UP (ref 0.4–2)
BILIRUB UR-MCNC: NEGATIVE — SIGNIFICANT CHANGE UP
BLD GP AB SCN SERPL QL: SIGNIFICANT CHANGE UP
BUN SERPL-MCNC: 28 MG/DL — HIGH (ref 8–20)
CALCIUM SERPL-MCNC: 10.1 MG/DL — SIGNIFICANT CHANGE UP (ref 8.6–10.2)
CHLORIDE SERPL-SCNC: 106 MMOL/L — SIGNIFICANT CHANGE UP (ref 98–107)
CK SERPL-CCNC: 77 U/L — SIGNIFICANT CHANGE UP (ref 30–200)
CO2 SERPL-SCNC: 21 MMOL/L — LOW (ref 22–29)
COLOR SPEC: YELLOW — SIGNIFICANT CHANGE UP
CREAT SERPL-MCNC: 2.21 MG/DL — HIGH (ref 0.5–1.3)
DIFF PNL FLD: ABNORMAL
EOSINOPHIL # BLD AUTO: 0.2 K/UL — SIGNIFICANT CHANGE UP (ref 0–0.5)
EOSINOPHIL NFR BLD AUTO: 2.2 % — SIGNIFICANT CHANGE UP (ref 0–5)
EPI CELLS # UR: SIGNIFICANT CHANGE UP
GLUCOSE BLDC GLUCOMTR-MCNC: 154 MG/DL — HIGH (ref 70–99)
GLUCOSE SERPL-MCNC: 187 MG/DL — HIGH (ref 70–115)
GLUCOSE UR QL: 50 MG/DL
HCT VFR BLD CALC: 29.2 % — LOW (ref 42–52)
HGB BLD-MCNC: 9.3 G/DL — LOW (ref 14–18)
INR BLD: 1.04 RATIO — SIGNIFICANT CHANGE UP (ref 0.88–1.16)
KETONES UR-MCNC: NEGATIVE — SIGNIFICANT CHANGE UP
LEUKOCYTE ESTERASE UR-ACNC: ABNORMAL
LYMPHOCYTES # BLD AUTO: 1.3 K/UL — SIGNIFICANT CHANGE UP (ref 1–4.8)
LYMPHOCYTES # BLD AUTO: 12.6 % — LOW (ref 20–55)
MCHC RBC-ENTMCNC: 29.1 PG — SIGNIFICANT CHANGE UP (ref 27–31)
MCHC RBC-ENTMCNC: 31.8 G/DL — LOW (ref 32–36)
MCV RBC AUTO: 91.3 FL — SIGNIFICANT CHANGE UP (ref 80–94)
MONOCYTES # BLD AUTO: 0.6 K/UL — SIGNIFICANT CHANGE UP (ref 0–0.8)
MONOCYTES NFR BLD AUTO: 6.4 % — SIGNIFICANT CHANGE UP (ref 3–10)
NEUTROPHILS # BLD AUTO: 8 K/UL — SIGNIFICANT CHANGE UP (ref 1.8–8)
NEUTROPHILS NFR BLD AUTO: 78.2 % — HIGH (ref 37–73)
NITRITE UR-MCNC: NEGATIVE — SIGNIFICANT CHANGE UP
PH UR: 8 — SIGNIFICANT CHANGE UP (ref 5–8)
PLATELET # BLD AUTO: 220 K/UL — SIGNIFICANT CHANGE UP (ref 150–400)
POTASSIUM SERPL-MCNC: 4.7 MMOL/L — SIGNIFICANT CHANGE UP (ref 3.5–5.3)
POTASSIUM SERPL-SCNC: 4.7 MMOL/L — SIGNIFICANT CHANGE UP (ref 3.5–5.3)
PROT SERPL-MCNC: 5.7 G/DL — LOW (ref 6.6–8.7)
PROT UR-MCNC: 30 MG/DL
PROTHROM AB SERPL-ACNC: 11.5 SEC — SIGNIFICANT CHANGE UP (ref 9.8–12.7)
RBC # BLD: 3.2 M/UL — LOW (ref 4.6–6.2)
RBC # FLD: 14.7 % — SIGNIFICANT CHANGE UP (ref 11–15.6)
RBC CASTS # UR COMP ASSIST: SIGNIFICANT CHANGE UP /HPF (ref 0–4)
SODIUM SERPL-SCNC: 142 MMOL/L — SIGNIFICANT CHANGE UP (ref 135–145)
SP GR SPEC: 1.01 — SIGNIFICANT CHANGE UP (ref 1.01–1.02)
TROPONIN T SERPL-MCNC: 0.02 NG/ML — SIGNIFICANT CHANGE UP (ref 0–0.06)
TYPE + AB SCN PNL BLD: SIGNIFICANT CHANGE UP
UROBILINOGEN FLD QL: NEGATIVE MG/DL — SIGNIFICANT CHANGE UP
WBC # BLD: 10.2 K/UL — SIGNIFICANT CHANGE UP (ref 4.8–10.8)
WBC # FLD AUTO: 10.2 K/UL — SIGNIFICANT CHANGE UP (ref 4.8–10.8)
WBC UR QL: SIGNIFICANT CHANGE UP

## 2017-12-20 PROCEDURE — 93010 ELECTROCARDIOGRAM REPORT: CPT

## 2017-12-20 PROCEDURE — 71010: CPT | Mod: 26

## 2017-12-20 PROCEDURE — 72141 MRI NECK SPINE W/O DYE: CPT | Mod: 26

## 2017-12-20 PROCEDURE — 99291 CRITICAL CARE FIRST HOUR: CPT

## 2017-12-20 PROCEDURE — 70547 MR ANGIOGRAPHY NECK W/O DYE: CPT | Mod: 26

## 2017-12-20 PROCEDURE — 99223 1ST HOSP IP/OBS HIGH 75: CPT

## 2017-12-20 PROCEDURE — 70551 MRI BRAIN STEM W/O DYE: CPT | Mod: 26

## 2017-12-20 PROCEDURE — 70450 CT HEAD/BRAIN W/O DYE: CPT | Mod: 26

## 2017-12-20 PROCEDURE — 70544 MR ANGIOGRAPHY HEAD W/O DYE: CPT | Mod: 26,59

## 2017-12-20 RX ORDER — LABETALOL HCL 100 MG
10 TABLET ORAL
Qty: 0 | Refills: 0 | Status: COMPLETED | OUTPATIENT
Start: 2017-12-20 | End: 2017-12-21

## 2017-12-20 RX ORDER — NICARDIPINE HYDROCHLORIDE 30 MG/1
5 CAPSULE, EXTENDED RELEASE ORAL
Qty: 40 | Refills: 0 | Status: DISCONTINUED | OUTPATIENT
Start: 2017-12-20 | End: 2017-12-20

## 2017-12-20 RX ORDER — LABETALOL HCL 100 MG
20 TABLET ORAL ONCE
Qty: 0 | Refills: 0 | Status: COMPLETED | OUTPATIENT
Start: 2017-12-20 | End: 2017-12-20

## 2017-12-20 RX ORDER — QUETIAPINE FUMARATE 200 MG/1
100 TABLET, FILM COATED ORAL ONCE
Qty: 0 | Refills: 0 | Status: COMPLETED | OUTPATIENT
Start: 2017-12-20 | End: 2017-12-20

## 2017-12-20 RX ADMIN — NICARDIPINE HYDROCHLORIDE 25 MG/HR: 30 CAPSULE, EXTENDED RELEASE ORAL at 13:33

## 2017-12-20 RX ADMIN — Medication 0.5 MILLIGRAM(S): at 19:03

## 2017-12-20 RX ADMIN — Medication 20 MILLIGRAM(S): at 17:15

## 2017-12-20 RX ADMIN — QUETIAPINE FUMARATE 100 MILLIGRAM(S): 200 TABLET, FILM COATED ORAL at 18:13

## 2017-12-20 NOTE — CONSULT NOTE ADULT - SUBJECTIVE AND OBJECTIVE BOX
CHIEF COMPLAINT: weakness, r/o CVA    HPI: 67yMal  · HPI Objective Statement: 67 year old male with a h/o dementia, nDM, HTN and bipolar disorder presents with left facial droop. ,pt is from a nursing home and at 9 am he was noted to have a left facial droop and left wrist drop	    No other history is available, Patient unable to provide information    PAST MEDICAL & SURGICAL HISTORY:  HTN (hypertension)  Urinary retention  Renal impairment  Bipolar 1 disorder  S/P TURP    MEDICATIONS  (STANDING):  niCARdipine Infusion 5 mG/Hr (25 mL/Hr) IV Continuous <Continuous>    MEDICATIONS  (PRN):    Allergies    No Known Allergies    Intolerances        FAMILY HISTORY:  No pertinent family history in first degree relatives          SOCIAL HISTORY:    Tobacco:  no  Alcohol:  no  Drugs:  no        REVIEW OF SYSTEMS:    Relevant systems are negative except as noted in the chart, HPI, and PMH      VITAL SIGNS:  Vital Signs Last 24 Hrs  T(C): 36.7 (20 Dec 2017 13:34), Max: 36.9 (20 Dec 2017 13:19)  T(F): 98 (20 Dec 2017 13:34), Max: 98.4 (20 Dec 2017 13:19)  HR: 78 (20 Dec 2017 13:40) (60 - 78)  BP: 178/95 (20 Dec 2017 14:04) (176/79 - 225/101)  BP(mean): --  RR: 20 (20 Dec 2017 13:40) (16 - 20)  SpO2: 99% (20 Dec 2017 13:40) (97% - 100%)    PHYSICAL EXAMINATION:    General: Well-developed, well nourished, in no acute distress.  Cardiac:  Regular rate and rhythm. No carotid bruits appreciated.  Eyes: Fundoscopic examination was deferred.  Neurologic:  - Mental Status:  Alert, awake, oriented to person. Follows commands. Severe bradykinesia and psychomotor slowing  Cranial Nerves II-XII:    II:  ; Visual fields are full to confrontation; Pupils are equal, round, and reactive to light.  III, IV, VI:  Extraocular movements are intact without nystagmus.  V:  Facial sensation is intact in the V1-V3 distribution bilaterally.  VII:  Face is symmetric with normal eye closure and smile  VIII:  Hearing is grossly intact  IX, X, XII:  speech is clear  XI:  Head turning and shoulder shrug are intact.  - Motor:  Strength exam is limited but right side appears without weakness. There is 3/5 strength of left wrist extensors and 4+/5 of left triceps  There is no pronator drift. .  Cogwheel rigidity of limbs without tremor  - Reflexes:  1+ and symmetric at the knees.  Plantar responses nonreacitve  - Sensory:   Grossly Symmetric to light touch        LABS:                          9.3    10.2  )-----------( 220      ( 20 Dec 2017 13:37 )             29.2             PT/INR - ( 20 Dec 2017 13:37 )   PT: 11.5 sec;   INR: 1.04 ratio         PTT - ( 20 Dec 2017 13:37 )  PTT:29.3 sec      RADIOLOGY & ADDITIONAL STUDIES:      < from: CT Head No Cont (12.20.17 @ 13:15) >  There is prominence of the ventricles and cortical sulci. Midline   structures are intact. There are patchy hypodensities in periventricular   distribution consistent with chronic small vessel ischemic changes. There   is no CT evidence of acute territorial infarction.  There is no   hemorrhage, contusion or extraaxial collection. There is no acute   hydrocephalus. The visualized posterior fossa structures are intact.   There are scattered intracranial arterial calcification.  The visualized   paranasal sinuses are clear.    < end of copied text >    IMPRESSION:    Dementia  Parkinsonism- ?h/o neuroleptic exposure?   Left wrist drop- suspect radial nerve palsy r/o Crad r/o cva    PLAN:  1. MRI brain and C spine  2.  Vascular imaging studies  3. Stroke protocols  4.Cerebrovascular risk factor assessment and management  5. Antiplatelet therapy as prescribed.  6.Medical and Cardiac evaluation and treatment as indicated  7. Psych eval: meds CHIEF COMPLAINT: weakness, r/o CVA    HPI: 67yMal  · HPI Objective Statement: 67 year old male with a h/o dementia, nDM, HTN and bipolar disorder presents with left facial droop. ,pt is from a nursing home and at 9 am he was noted to have a left facial droop and left wrist drop	    No other history is available, Patient unable to provide information    PAST MEDICAL & SURGICAL HISTORY:  HTN (hypertension)  Urinary retention  Renal impairment  Bipolar 1 disorder  S/P TURP    MEDICATIONS  (STANDING):  niCARdipine Infusion 5 mG/Hr (25 mL/Hr) IV Continuous <Continuous>    include: buspar, seroquel, cogentin and  trazadone    MEDICATIONS  (PRN):    Allergies    No Known Allergies    Intolerances        FAMILY HISTORY:  No pertinent family history in first degree relatives          SOCIAL HISTORY:    Tobacco:  no  Alcohol:  no  Drugs:  no        REVIEW OF SYSTEMS:    Relevant systems are negative except as noted in the chart, HPI, and PMH      VITAL SIGNS:  Vital Signs Last 24 Hrs  T(C): 36.7 (20 Dec 2017 13:34), Max: 36.9 (20 Dec 2017 13:19)  T(F): 98 (20 Dec 2017 13:34), Max: 98.4 (20 Dec 2017 13:19)  HR: 78 (20 Dec 2017 13:40) (60 - 78)  BP: 178/95 (20 Dec 2017 14:04) (176/79 - 225/101)  BP(mean): --  RR: 20 (20 Dec 2017 13:40) (16 - 20)  SpO2: 99% (20 Dec 2017 13:40) (97% - 100%)    PHYSICAL EXAMINATION:    General: Well-developed, well nourished, in no acute distress.  Cardiac:  Regular rate and rhythm. No carotid bruits appreciated.  Eyes: Fundoscopic examination was deferred.  Neurologic:  - Mental Status:  Alert, awake, oriented to person. Follows commands. Severe bradykinesia and psychomotor slowing  Cranial Nerves II-XII:    II:  ; Visual fields are full to confrontation; Pupils are equal, round, and reactive to light.  III, IV, VI:  Extraocular movements are intact without nystagmus.  V:  Facial sensation is intact in the V1-V3 distribution bilaterally.  VII:  Face is symmetric with normal eye closure and smile  VIII:  Hearing is grossly intact  IX, X, XII:  speech is clear  XI:  Head turning and shoulder shrug are intact.  - Motor:  Strength exam is limited but right side appears without weakness. There is 3/5 strength of left wrist extensors and 4+/5 of left triceps  There is no pronator drift. .  Cogwheel rigidity of limbs without tremor  - Reflexes:  1+ and symmetric at the knees.  Plantar responses nonreacitve  - Sensory:   Grossly Symmetric to light touch        LABS:                          9.3    10.2  )-----------( 220      ( 20 Dec 2017 13:37 )             29.2             PT/INR - ( 20 Dec 2017 13:37 )   PT: 11.5 sec;   INR: 1.04 ratio         PTT - ( 20 Dec 2017 13:37 )  PTT:29.3 sec      RADIOLOGY & ADDITIONAL STUDIES:      < from: CT Head No Cont (12.20.17 @ 13:15) >  There is prominence of the ventricles and cortical sulci. Midline   structures are intact. There are patchy hypodensities in periventricular   distribution consistent with chronic small vessel ischemic changes. There   is no CT evidence of acute territorial infarction.  There is no   hemorrhage, contusion or extraaxial collection. There is no acute   hydrocephalus. The visualized posterior fossa structures are intact.   There are scattered intracranial arterial calcification.  The visualized   paranasal sinuses are clear.    < end of copied text >    IMPRESSION:    Dementia  Parkinsonism- ?h/o neuroleptic exposure?   Left wrist drop- suspect radial nerve palsy r/o Crad r/o cva    PLAN:  1. MRI brain and C spine  2.  Vascular imaging studies  3. Stroke protocols  4.Cerebrovascular risk factor assessment and management  5. Antiplatelet therapy as prescribed.  6.Medical and Cardiac evaluation and treatment as indicated  7. Psych eval: meds

## 2017-12-20 NOTE — CONSULT NOTE ADULT - PROBLEM SELECTOR RECOMMENDATION 3
Avoid nephrotoxic agents   monitor UO  Urology consult. pt w/ chronic indwelling gama     continue to trend Crt/BUN

## 2017-12-20 NOTE — ED ADULT NURSE REASSESSMENT NOTE - NS ED NURSE REASSESS COMMENT FT1
Pt sleeping, responsive to pain stimuli. Pts son speaking with md echeverria at bedside about plan of care. Please refer to flowsheets for all vitals, BG at [id number]. Pt turned on left side and repositioned with pillows. 1:1 PCA still at bedside, Pt sleeping, responsive to pain stimuli and falls back to sleep, all sensory skills intact. Pts son speaking with md Karimi at bedside about plan of care. Please refer to flow sheets for all vitals, BG at [id number]. Pt turned on left side and repositioned with pillows. 1:1 PCA still at bedside. When asked if tired pt states "yes", unable to complete full nih at this time.

## 2017-12-20 NOTE — ED ADULT NURSE NOTE - CHIEF COMPLAINT QUOTE
Sent from [facility] for "new onset left wrist drop and left facial droop." Last seen normal at 9 am as per EMS." Currently being treated for UTI with IV Zosyn. Patient denies complaints. No facial droop present upon arrival. MD called to bedside for evaluation. PIV present to R hand placed PTA. Right leg drift present.

## 2017-12-20 NOTE — ED ADULT NURSE REASSESSMENT NOTE - NIH STROKE SCALE RESULT
5-15 (moderate stroke)
16-20 (moderately severe stroke)
5-15 (moderate stroke)
5-15 (moderate stroke)

## 2017-12-20 NOTE — ED ADULT NURSE NOTE - OBJECTIVE STATEMENT
[facility] of left hand drooping and left side facial droop. upon ER MD assessment pt with neither of those mentioned symptoms, but showing right lower extremity weakness. pt is awake and alert, follows commands, hx dementia. pt last known well is 9am today. pt currently also being treated for UTI, gama cath in place on arrival to ED. pt afebrile, no resp distress, code stroke activated by ER MD.

## 2017-12-20 NOTE — ED ADULT NURSE REASSESSMENT NOTE - NS ED NURSE REASSESS COMMENT FT1
Pt seems much more comfortable now that daughter is at bedside. BP resulting in systolics of 150's, cardene drip turned off and immediately returned to a systolic of 212, cardene drip readministered at a rate of 5mg/ml and now systolic 179.

## 2017-12-20 NOTE — H&P ADULT - ASSESSMENT
This is a 66 yo man with PMH of DM, HTN, psychiatric disease on Lithium for "years", prostate disease s/p surgery, chronic indwelling gama for "nonfunctioning bladder" and Urinary retention, parathyroid disease s/p resection and CRF who was following a nephrologist in [place] but was moved to [facility], admitted for left wrist and facial droop, last well known 9AM on day of arrival.    Neurology consult appreciated: Parkinsonism- ?h/o neuroleptic exposure?, Left wrist drop- suspect radial nerve palsy r/o Crad r/o cva    ICU consulted while patient was in ER for Hypertensive encephalopathy on nicardipine gtt, w/ 's. Nicardipine drip was started without trail of injectable anti-HTN meds. 20mg IVP of labetalol was given and titrate down/ turn off drip after IV push. (SBP remained 150's.)  MRI neck:  Abnormal increased T2 signal consistent with edema seen on the right side of the cord at C5 and C6 level measuring 5 x 6 mm in axial dimension and 19 mm in craniocaudal dimension. Likely due to hemosiderin hemosiderin.  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    Hypertensive encephalopathy vs dementia vs CVA   Admit to stroke unit  Neuro checks  MRI brain and C spine – completed, prelim result reviewed  Neurosurgery consulted for abnormality on C-Spine (noted above)  f/u Carotid Doppler, ECHO,   Completed 11/2017 lipid (Tchol;123 HLD; 37 LDL 61)and A1C (5.6)  Labetalol 10mg PRN for SBP >180  NPO, advance as tolerated once pt pass bedside dysphagia screening    Acute on CKD -3- post renal, obstructive pattern,   Gentle IV hydration   Reviewed Renal USG : no hydronephrosis  Avoid nephrotoxic drugs, dose medication to daily CrCl    Chronic Gama : for Urinary retention and UTI  continue Gama  cont current zosyn abx  f/u urine culture    Bipolar disorder and other psych comorbidities-   Held Seroquel, Trazodone, Buspirone due to prolonged obtunded mental status after ativan, cont 1:1 observation for pt safety    Type 2 DM with hyperglycemia, with Long term use of Insulin:  c/w Lantus and sliding scale   Lantus dose reduced from 22 U SQ to 11u Q HS. As pt currently NPO

## 2017-12-20 NOTE — ED ADULT NURSE REASSESSMENT NOTE - NS ED NURSE REASSESS COMMENT FT1
Pt aoxo2 ( not to location.) settled into I2 after assessment of stroke symptoms. Pt  ordered for 1:1 due to anxiety and attempting to get out of bed on his own. Pt  evaluated by md Chao in Ed, pending plan of care.  Pt daughter now at the bedside @ 1500 along with 1:1 PCA and updated on plan of care. Please refer to Neuro assessment for NIH/GCS and full neuro assessment.  Pt currently on cardene drip  at 5mg/ml to keep systolic goal of 180, will titrate appropriately. Pt aoxo2 ( not to location.) settled into I2 after assessment of stroke symptoms. Pt  ordered for 1:1 due to anxiety and attempting to get out of bed on his own. Pt  evaluated by md Chao in Ed, pending plan of care.  Pt daughter now at the bedside @ 1500 along with 1:1 PCA and updated on plan of care. Please refer to Neuro assessment for NIH/GCS and full neuro assessment.  Pt currently on cardene drip  at 5mg/ml to keep systolic goal of 180, will titrate appropriately.    During initial assessment pt present with leg bag from [facility]. Hardin changed in ER, urine orange in color, pt on pyridium

## 2017-12-20 NOTE — CONSULT NOTE ADULT - ASSESSMENT
68 y/o male pmhx of Bipolar, HTN, DM, presenting from [facility] with right sided facial droop and left wrist drop that began this morning, 66 y/o male pmhx of Bipolar, HTN, DM, presenting from [facility] with right sided facial droop and left wrist drop that began this morning. Pt seen in ED on nicardipine gtt, w/ 's. Pt still w/ left wrist drop but able to follow commands and w/out significant weakness of left side. CT head negative.  Pt started on nicardipine drip w/out trail of  injectable anti-HTN meds. Advised nurse to give 20mg IVP of labetalol and titrate down/ turn off drip, PRN pushes ordered as well. As per ED nurse drip is turned off and after IV push SBP is in 150's.      At this time patient does not need an ICU admission, please re consult if condition worsens. Case discussed w/ ICU attending Dr. Bar.

## 2017-12-20 NOTE — H&P ADULT - HISTORY OF PRESENT ILLNESS
History obtained from chart as patient is currently obtunded status post Ativan for MRI studies. He is a 67 year old male who was sent from [facility] for new onset left wrist drop and left facial droop. Last seen normal at 9 morning.   · Negative Findings: no blurred vision  · Timing: sudden onset  · Duration: hour(s)  4  · Severity: MILD  · Context: unknown  · Witnessed By: nursing home staff  · Aggravating Factors: none  · Relieving Factors: none

## 2017-12-20 NOTE — ED PROVIDER NOTE - NIH STROKE SCALE 1A. LEVEL OF CONSCIOUSNESS
(3) Responds only with reflex motor or autonomic effects or totally unresponsive, flaccid, and areflex

## 2017-12-20 NOTE — ED PROVIDER NOTE - CRITICAL CARE PROVIDED
consultation with other physicians/interpretation of diagnostic studies/consult w/ pt's family directly relating to pts condition/direct patient care (not related to procedure)/additional history taking/documentation

## 2017-12-20 NOTE — ED PROVIDER NOTE - OBJECTIVE STATEMENT
67 year old male with a h/o dementia, nDM, HTN and bipolar disorder presents with left facial droop. ,pt is from a nursing home and at 9 am he was noted to have a left facial droop and left wrist drop

## 2017-12-20 NOTE — ED PROVIDER NOTE - CARE PLAN
Principal Discharge DX:	Hypertensive encephalopathy  Secondary Diagnosis:	Hypertension, unspecified type

## 2017-12-20 NOTE — ED ADULT NURSE REASSESSMENT NOTE - NIH STROKE SCALE: 1B. LOC QUESTIONS, QM
(2) Answers neither question correctly
(1) Answers one question correctly
(1) Answers one question correctly

## 2017-12-20 NOTE — ED ADULT TRIAGE NOTE - CHIEF COMPLAINT QUOTE
Sent from [facility] for "new onset left wrist drop and left facial droop." Last seen normal at 9 am as per EMS." Currently being treated for UTI with IV Zosyn. Patient denies complaints. No facial droop present upon arrival. MD called to bedside for evaluation. PIV present to R hand placed PTA. Sent from [facility] for "new onset left wrist drop and left facial droop." Last seen normal at 9 am as per EMS." Currently being treated for UTI with IV Zosyn. Patient denies complaints. No facial droop present upon arrival. MD called to bedside for evaluation. PIV present to R hand placed PTA. Right leg drift present.

## 2017-12-20 NOTE — ED ADULT NURSE REASSESSMENT NOTE - NIH STROKE SCALE: 4. FACIAL PALSY, QM
(0) Normal symmetrical movements

## 2017-12-20 NOTE — H&P ADULT - NSHPPHYSICALEXAM_GEN_ALL_CORE
Vital Signs Last 24 Hrs  T(C): 36.6 (21 Dec 2017 00:00), Max: 37.1 (20 Dec 2017 22:00)  T(F): 97.8 (21 Dec 2017 00:00), Max: 98.8 (20 Dec 2017 22:00)  HR: 65 (21 Dec 2017 00:00) (60 - 105)  BP: 135/72 (21 Dec 2017 00:00) (132/75 - 225/101)  BP(mean): --  RR: 18 (21 Dec 2017 00:00) (16 - 20)  SpO2: 98% (21 Dec 2017 00:00) (96% - 100%) Vital Signs Last 24 Hrs  T(C): 36.6 (21 Dec 2017 00:00), Max: 37.1 (20 Dec 2017 22:00)  T(F): 97.8 (21 Dec 2017 00:00), Max: 98.8 (20 Dec 2017 22:00)  HR: 65 (21 Dec 2017 00:00) (60 - 105)  BP: 135/72 (21 Dec 2017 00:00) (132/75 - 225/101)  BP(mean): --  RR: 18 (21 Dec 2017 00:00) (16 - 20)  SpO2: 98% (21 Dec 2017 00:00) (96% - 100%)    Constitutional/General: Well developed, well nourished, vitals reviewed  EYE: PERRL, conjunctiva clear  ENT: Good dentition, dry oropharynx   NECK: No masses, thyroid normal  RESP: diminished bilateral air entry - poor patient effort, no wheezes, no rhonchi, normal effort  CV: RRR, normal S1S2, no murmur, 2+ DP pulses, no JVD, no edema  ABDOMEN: Soft, nontender, no HSM, Chronic indwelling gama (present on admission)  LYMPH: No cervical or supraclavicular adenopathy  SKIN: Warm, dry, no rashes  NEURO: limited due to patient being obtunded, not able to follow commands  PSYCHIATRIC: Oriented xO.

## 2017-12-20 NOTE — ED ADULT NURSE REASSESSMENT NOTE - NS ED NURSE REASSESS COMMENT FT1
patient now out of window for TPA. pt is awake, following most commands. Cardene gtt started as per ER MD Yepez at 5mg/hour, will titrate to keep systolic . pt in no resp distress, continues to have right leg weakness.

## 2017-12-20 NOTE — ED ADULT NURSE REASSESSMENT NOTE - NIH STROKE SCALE: 1A. LEVEL OF CONSCIOUSNESS, QM
(0) Alert; keenly responsive
(1) Not alert; but arousable by minor stimulation to obey, answer, or respond

## 2017-12-20 NOTE — ED ADULT NURSE REASSESSMENT NOTE - NS ED NURSE REASSESS COMMENT FT1
Pt back from MRI placed back in tele monitor. Pt resting comfortably in iso 2 with 1:1 pca at bedside. Md Karimi at bedside for admitting orders.  Pt responding to questions appropriately, left wrist drop still present and weakness to right leg.

## 2017-12-20 NOTE — ED ADULT NURSE REASSESSMENT NOTE - NS ED NURSE REASSESS COMMENT FT1
CAILIN Reyes of ICU at bedside assessing patient and speaking to daughter on plan of care. Labetolol will be ordered  in an attempt to get off of cardene drip, pending order. Will continue to monitor patient and notify md of any changes

## 2017-12-20 NOTE — ED ADULT NURSE REASSESSMENT NOTE - NIH STROKE SCALE: 8. SENSORY, QM
(0) Normal; no sensory loss

## 2017-12-20 NOTE — ED ADULT NURSE REASSESSMENT NOTE - NIH STROKE SCALE: 6B. MOTOR LEG, RIGHT, QM
(1) Drift; leg falls by the end of the 5-sec period but does not hit bed

## 2017-12-20 NOTE — ED ADULT NURSE REASSESSMENT NOTE - NS ED NURSE REASSESS COMMENT FT1
Notified that patients blood pressure at 1700 was with a systolic over 200's, at this time patient found to be extremely anxious and attempting to get out of bed. Pt appears to be sundowning. Pt deescalated and bp systolic 163. 20mg iv push labetalol ordered by Gume henry of ICU and administered, Hr 80bpm, systolic 159.  Both Gume henry and Md villa made aware of patients anxiety which may be attributing to his elevated BP. Pts son concerned that he did not get his Seroquel today and may be the reason he is anxious. Seroquel 100mg ordered and administered.  At this time Cardene drip is off to monitor appropriate blood pressures after labetalol given. If the patients bop does not sustain a systolic lower then 180, pt will be admitted to icu .  Will reassess vitals and notify md of any changes.

## 2017-12-20 NOTE — ED PROVIDER NOTE - PROGRESS NOTE DETAILS
pt was made a code stroke but he was out of of the 4 1/2 hour window and his blood pressure was too high pt was evaluated by the icu as his blood pressure was persitiently elevated and it eventually came down and he will be admitted  to medicine

## 2017-12-21 DIAGNOSIS — I63.9 CEREBRAL INFARCTION, UNSPECIFIED: ICD-10-CM

## 2017-12-21 DIAGNOSIS — F31.9 BIPOLAR DISORDER, UNSPECIFIED: ICD-10-CM

## 2017-12-21 DIAGNOSIS — E13.29 OTHER SPECIFIED DIABETES MELLITUS WITH OTHER DIABETIC KIDNEY COMPLICATION: ICD-10-CM

## 2017-12-21 DIAGNOSIS — N39.0 URINARY TRACT INFECTION, SITE NOT SPECIFIED: ICD-10-CM

## 2017-12-21 DIAGNOSIS — G54.2 CERVICAL ROOT DISORDERS, NOT ELSEWHERE CLASSIFIED: ICD-10-CM

## 2017-12-21 DIAGNOSIS — I67.4 HYPERTENSIVE ENCEPHALOPATHY: ICD-10-CM

## 2017-12-21 DIAGNOSIS — F05 DELIRIUM DUE TO KNOWN PHYSIOLOGICAL CONDITION: ICD-10-CM

## 2017-12-21 DIAGNOSIS — R33.9 RETENTION OF URINE, UNSPECIFIED: ICD-10-CM

## 2017-12-21 LAB
ANION GAP SERPL CALC-SCNC: 17 MMOL/L — SIGNIFICANT CHANGE UP (ref 5–17)
BASOPHILS # BLD AUTO: 0 K/UL — SIGNIFICANT CHANGE UP (ref 0–0.2)
BASOPHILS NFR BLD AUTO: 0.3 % — SIGNIFICANT CHANGE UP (ref 0–2)
BUN SERPL-MCNC: 24 MG/DL — HIGH (ref 8–20)
CALCIUM SERPL-MCNC: 10.8 MG/DL — HIGH (ref 8.6–10.2)
CHLORIDE SERPL-SCNC: 115 MMOL/L — HIGH (ref 98–107)
CK SERPL-CCNC: 89 U/L — SIGNIFICANT CHANGE UP (ref 30–200)
CO2 SERPL-SCNC: 19 MMOL/L — LOW (ref 22–29)
CREAT SERPL-MCNC: 1.93 MG/DL — HIGH (ref 0.5–1.3)
CULTURE RESULTS: NO GROWTH — SIGNIFICANT CHANGE UP
EOSINOPHIL # BLD AUTO: 0.1 K/UL — SIGNIFICANT CHANGE UP (ref 0–0.5)
EOSINOPHIL NFR BLD AUTO: 1.5 % — SIGNIFICANT CHANGE UP (ref 0–5)
GLUCOSE BLDC GLUCOMTR-MCNC: 141 MG/DL — HIGH (ref 70–99)
GLUCOSE BLDC GLUCOMTR-MCNC: 147 MG/DL — HIGH (ref 70–99)
GLUCOSE BLDC GLUCOMTR-MCNC: 184 MG/DL — HIGH (ref 70–99)
GLUCOSE BLDC GLUCOMTR-MCNC: 189 MG/DL — HIGH (ref 70–99)
GLUCOSE BLDC GLUCOMTR-MCNC: 193 MG/DL — HIGH (ref 70–99)
GLUCOSE SERPL-MCNC: 182 MG/DL — HIGH (ref 70–115)
HCT VFR BLD CALC: 33.5 % — LOW (ref 42–52)
HGB BLD-MCNC: 10.5 G/DL — LOW (ref 14–18)
LYMPHOCYTES # BLD AUTO: 1.2 K/UL — SIGNIFICANT CHANGE UP (ref 1–4.8)
LYMPHOCYTES # BLD AUTO: 12.7 % — LOW (ref 20–55)
MCHC RBC-ENTMCNC: 28.8 PG — SIGNIFICANT CHANGE UP (ref 27–31)
MCHC RBC-ENTMCNC: 31.3 G/DL — LOW (ref 32–36)
MCV RBC AUTO: 91.8 FL — SIGNIFICANT CHANGE UP (ref 80–94)
MONOCYTES # BLD AUTO: 0.6 K/UL — SIGNIFICANT CHANGE UP (ref 0–0.8)
MONOCYTES NFR BLD AUTO: 6.2 % — SIGNIFICANT CHANGE UP (ref 3–10)
NEUTROPHILS # BLD AUTO: 7.3 K/UL — SIGNIFICANT CHANGE UP (ref 1.8–8)
NEUTROPHILS NFR BLD AUTO: 79 % — HIGH (ref 37–73)
PLATELET # BLD AUTO: 234 K/UL — SIGNIFICANT CHANGE UP (ref 150–400)
POTASSIUM SERPL-MCNC: 4.2 MMOL/L — SIGNIFICANT CHANGE UP (ref 3.5–5.3)
POTASSIUM SERPL-SCNC: 4.2 MMOL/L — SIGNIFICANT CHANGE UP (ref 3.5–5.3)
RBC # BLD: 3.65 M/UL — LOW (ref 4.6–6.2)
RBC # FLD: 14.9 % — SIGNIFICANT CHANGE UP (ref 11–15.6)
SODIUM SERPL-SCNC: 151 MMOL/L — HIGH (ref 135–145)
SPECIMEN SOURCE: SIGNIFICANT CHANGE UP
WBC # BLD: 9.2 K/UL — SIGNIFICANT CHANGE UP (ref 4.8–10.8)
WBC # FLD AUTO: 9.2 K/UL — SIGNIFICANT CHANGE UP (ref 4.8–10.8)

## 2017-12-21 PROCEDURE — 99233 SBSQ HOSP IP/OBS HIGH 50: CPT

## 2017-12-21 PROCEDURE — 93306 TTE W/DOPPLER COMPLETE: CPT | Mod: 26

## 2017-12-21 PROCEDURE — 93880 EXTRACRANIAL BILAT STUDY: CPT | Mod: 26

## 2017-12-21 PROCEDURE — 73610 X-RAY EXAM OF ANKLE: CPT | Mod: 26,RT

## 2017-12-21 RX ORDER — PROPRANOLOL HCL 160 MG
20 CAPSULE, EXTENDED RELEASE 24HR ORAL
Qty: 0 | Refills: 0 | Status: DISCONTINUED | OUTPATIENT
Start: 2017-12-21 | End: 2017-12-27

## 2017-12-21 RX ORDER — BENZTROPINE MESYLATE 1 MG
1 TABLET ORAL
Qty: 0 | Refills: 0 | COMMUNITY

## 2017-12-21 RX ORDER — TAMSULOSIN HYDROCHLORIDE 0.4 MG/1
0.4 CAPSULE ORAL AT BEDTIME
Qty: 0 | Refills: 0 | Status: DISCONTINUED | OUTPATIENT
Start: 2017-12-21 | End: 2018-01-02

## 2017-12-21 RX ORDER — AMLODIPINE BESYLATE 2.5 MG/1
10 TABLET ORAL DAILY
Qty: 0 | Refills: 0 | Status: DISCONTINUED | OUTPATIENT
Start: 2017-12-21 | End: 2018-01-02

## 2017-12-21 RX ORDER — BENZTROPINE MESYLATE 1 MG
1 TABLET ORAL
Qty: 0 | Refills: 0 | Status: DISCONTINUED | OUTPATIENT
Start: 2017-12-21 | End: 2017-12-21

## 2017-12-21 RX ORDER — DEXTROSE 50 % IN WATER 50 %
1 SYRINGE (ML) INTRAVENOUS ONCE
Qty: 0 | Refills: 0 | Status: DISCONTINUED | OUTPATIENT
Start: 2017-12-21 | End: 2018-01-02

## 2017-12-21 RX ORDER — SUCRALFATE 1 G
1 TABLET ORAL
Qty: 0 | Refills: 0 | Status: DISCONTINUED | OUTPATIENT
Start: 2017-12-21 | End: 2018-01-02

## 2017-12-21 RX ORDER — DEXTROSE 50 % IN WATER 50 %
12.5 SYRINGE (ML) INTRAVENOUS ONCE
Qty: 0 | Refills: 0 | Status: DISCONTINUED | OUTPATIENT
Start: 2017-12-21 | End: 2018-01-02

## 2017-12-21 RX ORDER — PIPERACILLIN AND TAZOBACTAM 4; .5 G/20ML; G/20ML
2.25 INJECTION, POWDER, LYOPHILIZED, FOR SOLUTION INTRAVENOUS EVERY 8 HOURS
Qty: 0 | Refills: 0 | Status: DISCONTINUED | OUTPATIENT
Start: 2017-12-21 | End: 2017-12-23

## 2017-12-21 RX ORDER — DEXAMETHASONE 0.5 MG/5ML
4 ELIXIR ORAL EVERY 8 HOURS
Qty: 0 | Refills: 0 | Status: DISCONTINUED | OUTPATIENT
Start: 2017-12-21 | End: 2017-12-23

## 2017-12-21 RX ORDER — ATORVASTATIN CALCIUM 80 MG/1
40 TABLET, FILM COATED ORAL AT BEDTIME
Qty: 0 | Refills: 0 | Status: DISCONTINUED | OUTPATIENT
Start: 2017-12-21 | End: 2018-01-02

## 2017-12-21 RX ORDER — PIPERACILLIN AND TAZOBACTAM 4; .5 G/20ML; G/20ML
2.25 INJECTION, POWDER, LYOPHILIZED, FOR SOLUTION INTRAVENOUS EVERY 8 HOURS
Qty: 0 | Refills: 0 | Status: DISCONTINUED | OUTPATIENT
Start: 2017-12-21 | End: 2017-12-21

## 2017-12-21 RX ORDER — INSULIN LISPRO 100/ML
VIAL (ML) SUBCUTANEOUS
Qty: 0 | Refills: 0 | Status: DISCONTINUED | OUTPATIENT
Start: 2017-12-21 | End: 2018-01-02

## 2017-12-21 RX ORDER — LEVOTHYROXINE SODIUM 125 MCG
75 TABLET ORAL DAILY
Qty: 0 | Refills: 0 | Status: DISCONTINUED | OUTPATIENT
Start: 2017-12-21 | End: 2018-01-02

## 2017-12-21 RX ORDER — HYDRALAZINE HCL 50 MG
10 TABLET ORAL EVERY 8 HOURS
Qty: 0 | Refills: 0 | Status: DISCONTINUED | OUTPATIENT
Start: 2017-12-21 | End: 2018-01-02

## 2017-12-21 RX ORDER — INSULIN GLARGINE 100 [IU]/ML
11 INJECTION, SOLUTION SUBCUTANEOUS AT BEDTIME
Qty: 0 | Refills: 0 | Status: DISCONTINUED | OUTPATIENT
Start: 2017-12-21 | End: 2017-12-22

## 2017-12-21 RX ORDER — SODIUM CHLORIDE 9 MG/ML
1000 INJECTION INTRAMUSCULAR; INTRAVENOUS; SUBCUTANEOUS
Qty: 0 | Refills: 0 | Status: DISCONTINUED | OUTPATIENT
Start: 2017-12-21 | End: 2017-12-21

## 2017-12-21 RX ORDER — PHENAZOPYRIDINE HCL 100 MG
100 TABLET ORAL
Qty: 0 | Refills: 0 | Status: DISCONTINUED | OUTPATIENT
Start: 2017-12-21 | End: 2017-12-23

## 2017-12-21 RX ORDER — PANTOPRAZOLE SODIUM 20 MG/1
40 TABLET, DELAYED RELEASE ORAL
Qty: 0 | Refills: 0 | Status: DISCONTINUED | OUTPATIENT
Start: 2017-12-21 | End: 2018-01-02

## 2017-12-21 RX ORDER — SODIUM CHLORIDE 9 MG/ML
1000 INJECTION, SOLUTION INTRAVENOUS
Qty: 0 | Refills: 0 | Status: DISCONTINUED | OUTPATIENT
Start: 2017-12-21 | End: 2018-01-02

## 2017-12-21 RX ORDER — DEXTROSE 50 % IN WATER 50 %
25 SYRINGE (ML) INTRAVENOUS ONCE
Qty: 0 | Refills: 0 | Status: DISCONTINUED | OUTPATIENT
Start: 2017-12-21 | End: 2018-01-02

## 2017-12-21 RX ORDER — GLUCAGON INJECTION, SOLUTION 0.5 MG/.1ML
1 INJECTION, SOLUTION SUBCUTANEOUS ONCE
Qty: 0 | Refills: 0 | Status: DISCONTINUED | OUTPATIENT
Start: 2017-12-21 | End: 2018-01-02

## 2017-12-21 RX ORDER — CINACALCET 30 MG/1
30 TABLET, FILM COATED ORAL DAILY
Qty: 0 | Refills: 0 | Status: DISCONTINUED | OUTPATIENT
Start: 2017-12-21 | End: 2017-12-25

## 2017-12-21 RX ADMIN — Medication 1: at 18:00

## 2017-12-21 RX ADMIN — Medication 1: at 12:25

## 2017-12-21 RX ADMIN — Medication 10 MILLIGRAM(S): at 12:33

## 2017-12-21 RX ADMIN — Medication 4 MILLIGRAM(S): at 12:25

## 2017-12-21 RX ADMIN — Medication 100 MILLIGRAM(S): at 18:01

## 2017-12-21 RX ADMIN — PANTOPRAZOLE SODIUM 40 MILLIGRAM(S): 20 TABLET, DELAYED RELEASE ORAL at 08:42

## 2017-12-21 RX ADMIN — CINACALCET 30 MILLIGRAM(S): 30 TABLET, FILM COATED ORAL at 18:01

## 2017-12-21 RX ADMIN — Medication: at 21:29

## 2017-12-21 RX ADMIN — SODIUM CHLORIDE 85 MILLILITER(S): 9 INJECTION INTRAMUSCULAR; INTRAVENOUS; SUBCUTANEOUS at 05:41

## 2017-12-21 RX ADMIN — Medication 100 MILLIGRAM(S): at 08:42

## 2017-12-21 RX ADMIN — Medication 10 MILLIGRAM(S): at 08:42

## 2017-12-21 RX ADMIN — INSULIN GLARGINE 11 UNIT(S): 100 INJECTION, SOLUTION SUBCUTANEOUS at 21:12

## 2017-12-21 RX ADMIN — Medication 1 GRAM(S): at 12:26

## 2017-12-21 RX ADMIN — Medication 1 MILLIGRAM(S): at 21:42

## 2017-12-21 RX ADMIN — Medication 100 MILLIGRAM(S): at 12:33

## 2017-12-21 RX ADMIN — Medication 1 GRAM(S): at 08:42

## 2017-12-21 RX ADMIN — TAMSULOSIN HYDROCHLORIDE 0.4 MILLIGRAM(S): 0.4 CAPSULE ORAL at 21:03

## 2017-12-21 RX ADMIN — Medication 1 GRAM(S): at 18:01

## 2017-12-21 RX ADMIN — ATORVASTATIN CALCIUM 40 MILLIGRAM(S): 80 TABLET, FILM COATED ORAL at 21:03

## 2017-12-21 RX ADMIN — Medication 1 GRAM(S): at 21:03

## 2017-12-21 RX ADMIN — Medication 20 MILLIGRAM(S): at 12:26

## 2017-12-21 RX ADMIN — Medication 20 MILLIGRAM(S): at 18:01

## 2017-12-21 RX ADMIN — AMLODIPINE BESYLATE 10 MILLIGRAM(S): 2.5 TABLET ORAL at 18:01

## 2017-12-21 RX ADMIN — PIPERACILLIN AND TAZOBACTAM 200 GRAM(S): 4; .5 INJECTION, POWDER, LYOPHILIZED, FOR SOLUTION INTRAVENOUS at 18:01

## 2017-12-21 RX ADMIN — Medication 4 MILLIGRAM(S): at 21:12

## 2017-12-21 RX ADMIN — PIPERACILLIN AND TAZOBACTAM 200 GRAM(S): 4; .5 INJECTION, POWDER, LYOPHILIZED, FOR SOLUTION INTRAVENOUS at 08:41

## 2017-12-21 NOTE — BEHAVIORAL HEALTH ASSESSMENT NOTE - OTHER
rigid not observed nonverbal nonverbal. Will reply yes. unable to assess sahil nonverbal at this time

## 2017-12-21 NOTE — CONSULT NOTE ADULT - ATTENDING COMMENTS
NSGY Attg:    see above  rec MRI c spine w and without contrast
I have seen and evaluated this patient independently and agree with the above: Pt with hypertensive urgency placed on cardene gtts- Resume home PO BB and titrate dose for effect with utilization of IVP meds PRN. Pt able to be titrated off of Cardene and does not require ICU LOC at this time, should his condition change and BP becomes uncontrollable please reconsult as condition dictates.

## 2017-12-21 NOTE — ED ADULT NURSE REASSESSMENT NOTE - NS ED NURSE REASSESS COMMENT FT1
pt awake and alert, refused blood work and dysphagia screen. pt states " I just want to be left alone, get out of my room."

## 2017-12-21 NOTE — PROGRESS NOTE ADULT - PROBLEM SELECTOR PLAN 5
c/w Lantus and sliding scale   Lantus dose reduced from 22 U SQ to 11u Q HS while pt NPO, return to usual dose when tolerating PO

## 2017-12-21 NOTE — CONSULT NOTE ADULT - ASSESSMENT
66 y/o male with history of bipolar disorder (1) and renal insufficiency presents 12/20 from [facility] new onset left wrist drop and left facial droop.  - MRI with abnormal 5x6mm (axial) by 19mm (cc) area of increased T2 signal on R side of the cord at C5/C6 level with blooming on the GRE concerning for hemosiderin.     PLAN:  Imaging reviewed.     NOTE IS INCOMPLETE 66 y/o male with history of bipolar disorder (1) and renal insufficiency presents 12/20 from [facility] new onset left wrist drop and left facial droop.  - MRI with abnormal 5x6mm (axial) by 19mm (cc) area of increased T2 signal on R side of the cord at C5/C6 level with blooming on the GRE concerning for hemosiderin.   - RUE weakness on our exam but difficult to examine further d/t cognitive dysfunction    PLAN:  Imaging reviewed.   Will order MRI w/contrast  Patient is not a NSG candidate at this time  Will continue to follow neurology  Steroids per neuro  Headache management per neuro/primary team 66 y/o male with history of bipolar disorder (1) and renal insufficiency presents 12/20 from [facility] new onset left wrist drop and left facial droop.  - MRI with abnormal 5x6mm (axial) by 19mm (cc) area of increased T2 signal on R side of the cord at C5/C6 level with blooming on the GRE concerning for hemosiderin.   - RUE weakness on our exam but difficult to examine further d/t cognitive dysfunction    PLAN:  Imaging reviewed.   Will order MRI w/contrast when renally feasible  Patient is not a NSG candidate at this time  Will continue to follow neurology  Steroids per neuro  Headache management per neuro/primary team 66 y/o male with history of bipolar disorder (1) and renal insufficiency presents 12/20 from [facility] new onset left wrist drop and left facial droop.  - MRI with abnormal 5x6mm (axial) by 19mm (cc) area of increased T2 signal on R side of the cord at C5/C6 level with blooming on the GRE concerning for hemosiderin.   - RUE weakness on our exam but difficult to examine further d/t cognitive dysfunction    PLAN:  Imaging reviewed.   Will order MRI c-spine w/contrast when renally feasible  Patient is not a NSG candidate at this time  Will continue to follow neuro exam  Steroids per neuro  Headache management per neuro/primary team

## 2017-12-21 NOTE — BEHAVIORAL HEALTH ASSESSMENT NOTE - SUMMARY
Patient is a 68 y/o male admitted to [facility] from [facility] found to have left wrist drop and left facial droop. Patient has long h/o of Bipolar Disorder maintained on Lithium for >25 years which was discontinued in October 2017 2/2 ARF. Patient was most recently prescribed Seroquel, Trazodone and Buspar.   Patient seen and evaluated received collateral from daughter and treating psychiatrist at [facility]. Patient appears catatonic, rigid with hand tremor and right arm cogwheeling. As per daughter patient has had memory problems and confusion since October however he was able to communicate. Patient has had hand tremor for several years   recommend.   CPK-  would hold psychotropics for now  Psychiatry will follow  continue constant observation

## 2017-12-21 NOTE — PROGRESS NOTE ADULT - ATTENDING COMMENTS
67 y.o. man with hx of DM, HTN, psychiatric disease on Lithium for "years", prostate disease s/p surgery, chronic indwelling gama for "nonfunctioning bladder" and Urinary retention, parathyroid disease s/p resection and CRF who was following a nephrologist in [place] but was moved to [facility], admitted for left wrist and facial droop, r/o CVA, hypertensive urgency- noted to have Cervical lesion on studies, 67 y.o. man with hx of DM, HTN, Bipolar d/o, anxiety, CKD stage 3, prostate disease s/p surgery, urinary retention with chronic indwelling gama, parathyroid disease s/p resection and CKD sent from [facility] for left wrist drop and noted facial droop admitted for further evaluation and to rule out cva. CT head and MRI head with no acute infarct noted. MRI C -Spine with abnormal increased T2 signal consistent in the right side of the cord at C5 and C6 level measuring 5 x 6 mm in axial dimension and 19 mm in craniocaudal dimension. This lesion shows blooming on the gradient echo. This could also be attributing to patients current physical exam finding of left wrist drop. Neurology and neurosurgery consulted. Recommended placing the pt on decadron and contrast study, but given patients baseline renal function KOFI on ckd stage 3 will avoid contrast studies at this time, c/w sensipar and if contrast studies are indicated will consult nephrology prior to studies being completed. Also noted parkinsonism type symptoms, for which neurology recommended re-evaluation of psych meds and psych consulted and for now will maintain off of psych meds. Pt from the [facility] dx with UTI and has been on zosyn since 12/14/17 D# 8. Called facility to discuss duration of abx b/c UA and Ucx negative currently, but given pt has indwelling gama catheter and unclear as to what organism grew. DM c/w lantus/ HSS and hypoglycemia protocol. Normocytic anemia, at baseline, follow up anemia panel. BPH c/w flomax. Hypothyroidism c/w synthroid. DVT ppx. 67 y.o. man with hx of DM, HTN, Bipolar d/o, anxiety, CKD stage 3, prostate disease s/p surgery, urinary retention with chronic indwelling gama, parathyroid disease s/p resection and CKD sent from [facility] for left wrist drop and noted facial droop admitted for further evaluation and to rule out cva. CT head and MRI head with no acute infarct noted. MRI C -Spine with abnormal increased T2 signal consistent in the right side of the cord at C5 and C6 level measuring 5 x 6 mm in axial dimension and 19 mm in craniocaudal dimension. This lesion shows blooming on the gradient echo. This could also be attributing to patients current physical exam finding of left wrist drop. Neurology and neurosurgery consulted. Recommended placing the pt on decadron and contrast study, but given patients baseline renal function KOFI on ckd stage 3 will avoid contrast studies at this time, c/w sensipar and if contrast studies are indicated will consult nephrology prior to studies being completed. Also noted parkinsonism type symptoms, for which neurology recommended re-evaluation of psych meds and psych consulted and for now will maintain off of psych meds. Uncontrolled HTN initially on nicardipine drip in the ER, ICU consulted but downgraded the pt to medicine b/c IV push or oral medication was not trialed prior to placing on a drip. Pt restarted on propranolol from NH paperwork as well as norvasc and prn hydralzine added for elevated sbp. Pt from the NH dx with UTI and has been on zosyn since 12/14/17 D# 8. Called facility to discuss duration of abx b/c UA and Ucx negative currently, but given pt has indwelling gama catheter and unclear as to what organism grew. DM c/w lantus/ HSS and hypoglycemia protocol. Normocytic anemia, at baseline, follow up anemia panel. BPH c/w flomax. Hypothyroidism c/w synthroid. DVT ppx.

## 2017-12-21 NOTE — PROGRESS NOTE ADULT - PROBLEM SELECTOR PLAN 2
Clinically improved- no facial droop, wrist drop appears to be radial nerve palsey  Being ruled out for CVA- Neurology following-   Results of Head CT, MRI head, MRA Neck- reviewed  Completed 11/2017 lipid (Tchol;123 HLD; 37 LDL 61)and A1C (5.6)  Swallow eval  Carotid dopplers and TTE ordered

## 2017-12-21 NOTE — BEHAVIORAL HEALTH ASSESSMENT NOTE - HPI (INCLUDE ILLNESS QUALITY, SEVERITY, DURATION, TIMING, CONTEXT, MODIFYING FACTORS, ASSOCIATED SIGNS AND SYMPTOMS)
Patient is a 66 y/o male admitted to [facility] from [facility] for change in mental status with accompanied left wrist drop and left facial droop. PMH includes HTN, urinary retention, renal impairment, TURP and chronic indwelling gama catheter, PPH includes long h/o of Bipolar Disorder, two prior inpatient psychiatric hospitalizations, one prior suicide attempt in 1194 by overdose, no h/o substance abuse or violence. Patient is non verbal, received personal collateral from daughter Naz [place]. Patient was on Lithium for over 25 years. In September he was admitted to a hospital in [place] for ARF and dehydration. During that hospitalization his Lithium was decreased and he was eventually discharged to [facility]. Unhappy with his progress patient's daughter had him transferred to [facility]. While at [facility] he was followed by psychiatry who discontinued his Lithium and started Seroquel, Trazodone, Buspar and Cogentin. He was discharged to [facility] in November and followed by psychiatrist Dr Valerio. Naz reports patient has had a hand tremor for several years. She is not able to provide information regarding his gait or ability to care for self. Naz reports patient had been confused since October but was more communicative.   Received collateral from  Dr Valerio who reports patient was intermittently paranoid but remained in behavioral control. Dr Valerio discontinued cogentin December 5th. [facility] reports patient needed assistance with ADL's, go not walk independently and has appeared more anxious over the past several weeks.

## 2017-12-21 NOTE — CONSULT NOTE ADULT - SUBJECTIVE AND OBJECTIVE BOX
NOTE IS INCOMPLETE  HISTORY OF PRESENT ILLNESS:   History obtained from chart as patient is not cooperative at the time of exam. He is a 67 year old male who was sent from [facility] for new onset left wrist drop and left facial droop. Last seen normal at 9 morning.     PAST MEDICAL & SURGICAL HISTORY:  HTN (hypertension)  Urinary retention  Renal impairment  Bipolar 1 disorder  S/P TURP    FAMILY HISTORY:  No pertinent family history in first degree relatives    SOCIAL HISTORY:    Allergies  No Known Allergies    REVIEW OF SYSTEMS  12 pt ROS otherwise unremarkable    Vital Signs Last 24 Hrs  T(C): 36.8 (21 Dec 2017 09:37), Max: 37.1 (20 Dec 2017 22:00)  T(F): 98.2 (21 Dec 2017 09:37), Max: 98.8 (20 Dec 2017 22:00)  HR: 58 (21 Dec 2017 09:37) (58 - 105)  BP: 176/80 (21 Dec 2017 09:37) (132/75 - 225/101)  RR: 18 (21 Dec 2017 09:37) (16 - 20)  SpO2: 99% (21 Dec 2017 09:37) (96% - 100%)  PHYSICAL EXAM:  GENERAL: NAD, well-groomed, well-developed  HEAD:  Atraumatic, normocephalic  SHUNT: easily compressible and refills  EYES: Conjunctiva and sclera clear; corneal reflex intact  ENMT: No tonsillar erythema, exudates, or enlargement; moist mucous membranes, good dentition, no lesions  NECK: Supple, no JVD, dormal thyroid  MENTAL STATUS: AAO x3; Awake/Comatose; Opens eyes spontaneously/to voice/to light touch/to noxious stimuli; Appropriately conversant without aphasia/Nonverbal; following simple commands/mimicking/not following commands  CRANIAL NERVES: PERRL; EOMI; visual fields grossly intact; corneals intact b/l; Dolls sign positive; blinks to threat b/l; no facial asymmetry; facial sensation grossly intact to light touch b/l;  tongue midline; palate rises symmetrically; gag reflex intact  MOTOR: strength 5/5 B/L upper and lower extremities  COORDINATION: Pronator drift; gait intact; rapid alternating movements intact b/l upper extremities; no upper extremity dysmetria  SENSATION: grossly intact to light touch all extremities  MUSCLE STRETCH REFLEXES: DTRs 2+ intact and symmetric; no Batista's b/l; no clonus b/l  PLANTAR: upgoing/downgoing/mute (Babinski)  CHEST/LUNG: Clear to auscultation bilaterally; no rales, rhonchi, wheezing, or rubs  HEART: +S1/+S2; Regular rate and rhythm; no murmurs, rubs, or gallops  ABDOMEN: Soft, nontender, nondistended; bowel sounds present all four quadrants  EXTREMITIES:  2+ peripheral pulses, no clubbing, cyanosis, or edema  LYMPH: No lymphadenopathy noted  SKIN: Warm, dry; no rashes or lesions    LABS:             9.3    10.2  )-----------( 220                 29.2     142  |  106  |  28.0<H>  ----------------------------<  187<H>  4.7   |  21.0<L>  |  2.21<H>    Ca    10.1        TPro  5.7<L>  /  Alb  3.2<L>  /  TBili  0.4  /  DBili  x   /  AST  14  /  ALT  10  /  AlkPhos  76  12-20  PT/INR - ( 20 Dec 2017 13:37 )   PT: 11.5 sec;   INR: 1.04 ratio   PTT:29.3 sec    RADIOLOGY & ADDITIONAL STUDIES:  MR Cervical Spine No Cont (12.20.17 @ 21:46)   IMPRESSION:   Abnormal increased T2 signal consistent in the right side of the cord at   C5 and C6 level measuring 5 x 6 mm in axial dimension and 19 mm in   craniocaudal dimension. This lesion shows blooming on the gradient echo   MERGE images likely due to hemosiderin hemosiderin. Postcontrast images   are recommended.    MR Angio Head No Cont (12.20.17 @ 21:46)  IMPRESSION:       Normal branch occlusion or hemodynamically significant stenosis.    MR Angio Neck No Cont (12.20.17 @ 21:46)  IMPRESSION:       No hemodynamically significant stenosis at the origin of the internal   carotid arteries. HISTORY OF PRESENT ILLNESS:   History obtained from chart as patient is not cooperative at the time of exam. He is a 67 year old male who was sent from [facility] for new onset left wrist drop and reportedly a L facial droop. Patient underwent MRI which was unremarkable for ischemic injury. An MRI C-spine was performed which showed abnormal T2 signal on the R side of the cord at the C5/C6 level. Patient is drowsy and minimally cooperative  w/exam and history.     PAST MEDICAL & SURGICAL HISTORY:  HTN (hypertension)  Urinary retention  Renal impairment  Bipolar 1 disorder  S/P TURP    FAMILY HISTORY:  No pertinent family history in first degree relatives    SOCIAL HISTORY:  Unable to obtain    Allergies  No Known Allergies    REVIEW OF SYSTEMS  12 pt ROS otherwise unremarkable    Vital Signs Last 24 Hrs  T(C): 36.8 (21 Dec 2017 09:37), Max: 37.1 (20 Dec 2017 22:00)  T(F): 98.2 (21 Dec 2017 09:37), Max: 98.8 (20 Dec 2017 22:00)  HR: 58 (21 Dec 2017 09:37) (58 - 105)  BP: 176/80 (21 Dec 2017 09:37) (132/75 - 225/101)  RR: 18 (21 Dec 2017 09:37) (16 - 20)  SpO2: 99% (21 Dec 2017 09:37) (96% - 100%)  PHYSICAL EXAM:  GENERAL: NAD, frail, generalized atrophy  HEAD:  Atraumatic, normocephalic  EYES: Conjunctiva and sclera clear  ENMT:  moist mucous membranes  NECK: Supple, no JVD, no paraspinous tenderness or palpable step offs  MENTAL STATUS: Awake, nonverbal other than selective "yes/no", difficulty following one-step commands, mildly diminished L NLF, EOMI, pupils are 1mm symmetric and reactive  MOTOR: strength 5/5 LUE, 4+/5 RUE, unable to test ADP, no noted thenar atrophy, holds arms in the air with high frequency low amplitude bilateral intention tremors, +bradykinesia, no rigidity  COORDINATION: unable to test dysmetria  SENSATION: withdrawals to light touch all extremities  MUSCLE STRETCH REFLEXES:  no clonus b/l  PLANTAR: downgoing  CHEST/LUNG: Clear to auscultation bilaterally  HEART: +S1/+S2; Regular rate and rhythm  ABDOMEN: Soft, nontender, nondistended  EXTREMITIES:  2+ peripheral pulses, no clubbing, cyanosis, or edema  SKIN: Warm, dry; no rashes or lesions, scattered LE abrasions    LABS:             9.3    10.2  )-----------( 220                 29.2     142  |  106  |  28.0<H>  ----------------------------<  187<H>  4.7   |  21.0<L>  |  2.21<H>    Ca    10.1        TPro  5.7<L>  /  Alb  3.2<L>  /  TBili  0.4  /  DBili  x   /  AST  14  /  ALT  10  /  AlkPhos  76  12-20  PT/INR - ( 20 Dec 2017 13:37 )   PT: 11.5 sec;   INR: 1.04 ratio   PTT:29.3 sec    RADIOLOGY & ADDITIONAL STUDIES:  MR Cervical Spine No Cont (12.20.17 @ 21:46)   IMPRESSION:   Abnormal increased T2 signal consistent in the right side of the cord at   C5 and C6 level measuring 5 x 6 mm in axial dimension and 19 mm in   craniocaudal dimension. This lesion shows blooming on the gradient echo   MERGE images likely due to hemosiderin hemosiderin. Postcontrast images   are recommended.    MR Angio Head No Cont (12.20.17 @ 21:46)  IMPRESSION:       Normal branch occlusion or hemodynamically significant stenosis.    MR Angio Neck No Cont (12.20.17 @ 21:46)  IMPRESSION:       No hemodynamically significant stenosis at the origin of the internal   carotid arteries.

## 2017-12-21 NOTE — ED ADULT NURSE REASSESSMENT NOTE - NS ED NURSE REASSESS COMMENT FT1
Attempted to hang liter of fluid on pt. pt awake and alert, disoriented to place. pt refusing IV fluids and to wear monitor leads. pt states "you are all trying to drug me, don't touch me." MD made aware.

## 2017-12-21 NOTE — ED ADULT NURSE REASSESSMENT NOTE - NS ED NURSE REASSESS COMMENT FT1
spoke with dr White- hospitalist, verbal order for 1 mg Ativan IVP, MD seen patient at bedside, patient remains on 1:1

## 2017-12-21 NOTE — PROGRESS NOTE ADULT - PROBLEM SELECTOR PLAN 6
Held Seroquel, Trazodone, Buspirone due to prolonged obtunded mental status after ativan, cont 1:1 observation for pt safety, Psych consult

## 2017-12-21 NOTE — ED ADULT NURSE REASSESSMENT NOTE - NS ED NURSE REASSESS COMMENT FT1
assumed care of patient in 1:1 supervision for safety, NA at bedside, VSS, gama to SBD, no distress noted, no complaints offered at this time, patient with HTN MDA, respirations even and unlabored

## 2017-12-21 NOTE — ED ADULT NURSE REASSESSMENT NOTE - NS ED NURSE REASSESS COMMENT FT1
Assumed care of pt at 2330. pt responds to verbal and painful stimuli, sleeping comfortably with 1:1 @ bedside for safety. pt in no apparent pain, discomfort or distress. pt admitted to [facility], will continue to monitor.

## 2017-12-21 NOTE — PROGRESS NOTE ADULT - SUBJECTIVE AND OBJECTIVE BOX
MASOUD ELLIS    [id number]    67y      Male    INTERVAL HPI/OVERNIGHT EVENTS:  History obtained from chart as patient is currently obtunded status post Ativan for MRI studies. He is a 67 year old male who was sent from [facility] for new onset left wrist drop and left facial droop. Last seen normal at 9 morning.   · Negative Findings: no blurred vision  · Timing: sudden onset  · Duration: hour(s)  4  · Severity: MILD  · Context: unknown  · Witnessed By: nursing home staff  · Aggravating Factors: none  · Relieving Factors: none    ICU consulted while patient was in ER for Hypertensive encephalopathy on nicardipine gtt, w/ 's. Nicardipine drip was started without trial of injectable anti-HTN meds. 20mg IVP of labetalol was given and titrate down/ turn off drip after IV push. (SBP remained 150's.)    This morning, pt is alert x 2 in no distress appears confused but this could be baseline. Slow to answer questions. Pt with no complaints. Does not appear to have facial droop.  Follows commands.  + Chronic gama.  Pt reports he ambulates normally with a cane.  When noted that his rt ankle appeared slightly swollen when compared to lt - he said he may have banged it.  Pt able to eat applesauce without difficulty but will order swallow eval.    REVIEW OF SYSTEMS:    CONSTITUTIONAL: No fever, weight loss, or fatigue  RESPIRATORY: No cough, wheezing, hemoptysis; No shortness of breath  CARDIOVASCULAR: No chest pain, palpitations  GASTROINTESTINAL: No abdominal or epigastric pain. No nausea, vomiting  NEUROLOGICAL: No headaches, + memory loss,+ loss of strength.      Vital Signs Last 24 Hrs  T(C): 36.8 (21 Dec 2017 09:37), Max: 37.1 (20 Dec 2017 22:00)  T(F): 98.2 (21 Dec 2017 09:37), Max: 98.8 (20 Dec 2017 22:00)  HR: 58 (21 Dec 2017 09:37) (58 - 105)  BP: 176/80 (21 Dec 2017 09:37) (132/75 - 225/101)  RR: 18 (21 Dec 2017 09:37) (16 - 20)  SpO2: 99% (21 Dec 2017 09:37) (96% - 100%)    PHYSICAL EXAM:    GENERAL: NAD, A & O x 2, calm   HEENT: PERRL, +EOMI  NECK: soft, Supple, No JVD,   CHEST/LUNG: Clear to ascultation bilaterally; No wheezing  HEART: S1S2+, Regular rate and rhythm; No murmurs, rubs, or gallops  ABDOMEN: Soft, Nontender, Nondistended; Bowel sounds present  EXTREMITIES:  2+ Peripheral Pulses, No clubbing, cyanosis, + Rt. ankle small amt of swelling compared to Lt., Lt wrist drop 4/5   SKIN: No rashes or lesions  NEURO: AAOX2, no facial droop, Rt wrist decreased strength compared to Lt., + bradykinesia, no rigidity  PSYCH: normal mood      LABS:                        9.3    10.2  )-----------( 220      ( 20 Dec 2017 13:37 )             29.2     12-20    142  |  106  |  28.0<H>  ----------------------------<  187<H>  4.7   |  21.0<L>  |  2.21<H>    Ca    10.1      20 Dec 2017 13:37    TPro  5.7<L>  /  Alb  3.2<L>  /  TBili  0.4  /  DBili  x   /  AST  14  /  ALT  10  /  AlkPhos  76  12-20    PT/INR - ( 20 Dec 2017 13:37 )   PT: 11.5 sec;   INR: 1.04 ratio         PTT - ( 20 Dec 2017 13:37 )  PTT:29.3 sec    Trop 0.02    Urinalysis Basic - ( 20 Dec 2017 15:05 )    Color: Yellow / Appearance: Clear / S.010 / pH: x  Gluc: x / Ketone: Negative  / Bili: Negative / Urobili: Negative mg/dL   Blood: x / Protein: 30 mg/dL / Nitrite: Negative   Leuk Esterase: Moderate / RBC: 0-2 /HPF / WBC 3-5   Sq Epi: x / Non Sq Epi: Occasional / Bacteria: x    Urine culture shows no growth    MEDICATIONS  (STANDING):  atorvastatin 40 milliGRAM(s) Oral at bedtime  cinacalcet 30 milliGRAM(s) Oral daily  dexamethasone  Injectable 4 milliGRAM(s) IV Push every 8 hours  dextrose 5%. 1000 milliLiter(s) (50 mL/Hr) IV Continuous <Continuous>  dextrose 50% Injectable 12.5 Gram(s) IV Push once  dextrose 50% Injectable 25 Gram(s) IV Push once  dextrose 50% Injectable 25 Gram(s) IV Push once  insulin glargine Injectable (LANTUS) 11 Unit(s) SubCutaneous at bedtime  insulin lispro (HumaLOG) corrective regimen sliding scale   SubCutaneous Before meals and at bedtime  levothyroxine 75 MICROGram(s) Oral daily  pantoprazole    Tablet 40 milliGRAM(s) Oral before breakfast  phenazopyridine 100 milliGRAM(s) Oral three times a day after meals  piperacillin/tazobactam IVPB. 2.25 Gram(s) IV Intermittent every 8 hours  propranolol 20 milliGRAM(s) Oral <User Schedule>  sucralfate 1 Gram(s) Oral Before meals and at bedtime  tamsulosin 0.4 milliGRAM(s) Oral at bedtime    MEDICATIONS  (PRN):  dextrose Gel 1 Dose(s) Oral once PRN Blood Glucose LESS THAN 70 milliGRAM(s)/deciliter  glucagon  Injectable 1 milliGRAM(s) IntraMuscular once PRN Glucose LESS THAN 70 milligrams/deciliter  hydrALAZINE Injectable 10 milliGRAM(s) IV Push every 8 hours PRN if sbp >170 or dbp >100      RADIOLOGY & ADDITIONAL TESTS:    CXR:  No evidence of active chest disease.    MR cervical spine:  Abnormal increased T2 signal consistent in the right side of the cord at   C5 and C6 level measuring 5 x 6 mm in axial dimension and 19 mm in   craniocaudal dimension. This lesion shows blooming on the gradient echo   MERGE images likely due to hemosiderin hemosiderin. Postcontrast images   are recommended. Preliminary report provided by APOLONIA CORDOVA M.D.;[facility]   RADIOLOGIST.     MRA Head:  Normal branch occlusion or hemodynamically significant stenosis.    MRA Neck:  No hemodynamically significant stenosis at the origin of the internal   carotid arteries.    MRI Head:  Diffusion restriction to represent acute infarct.    CT Head:  No parenchymal contusion, hemorrhage or extra-axial collection.  No CT evidence of an acute territorial infarction.      Neurology consult appreciated

## 2017-12-21 NOTE — ED ADULT NURSE REASSESSMENT NOTE - GLASGOW COMA SCALE: BEST MOTOR RESPONSE
(M6) obeys commands
(M6) obeys commands
(M5) localizes pain
(M6) obeys commands

## 2017-12-21 NOTE — PROGRESS NOTE ADULT - SUBJECTIVE AND OBJECTIVE BOX
INTERVAL HISTORY:  more alert today, less bradykinetic, no specific complaints      VITAL SIGNS:  Vital Signs Last 24 Hrs  T(C): 36.8 (21 Dec 2017 09:37), Max: 37.1 (20 Dec 2017 22:00)  T(F): 98.2 (21 Dec 2017 09:37), Max: 98.8 (20 Dec 2017 22:00)  HR: 58 (21 Dec 2017 09:37) (58 - 105)  BP: 176/80 (21 Dec 2017 09:37) (132/75 - 225/101)  BP(mean): --  RR: 18 (21 Dec 2017 09:37) (16 - 20)  SpO2: 99% (21 Dec 2017 09:37) (96% - 100%)    PHYSICAL EXAMINATION:    Mentation:  awake and fairly cooperative. oriented to name  Language/Speech: micorphonia, but intacts  CN: nl  Visual Fields: full  Motor: left wrist drop 4/5- improved.  Still moderate bradykinesia, no rigidity no cogwheel  Sensory:  DTR: 2+ KJ, tone normal  Babinski:  plantar      MEDS:  MEDICATIONS  (STANDING):  atorvastatin 40 milliGRAM(s) Oral at bedtime  cinacalcet 30 milliGRAM(s) Oral daily  dexamethasone  Injectable 4 milliGRAM(s) IV Push every 8 hours  dextrose 5%. 1000 milliLiter(s) (50 mL/Hr) IV Continuous <Continuous>  dextrose 50% Injectable 12.5 Gram(s) IV Push once  dextrose 50% Injectable 25 Gram(s) IV Push once  dextrose 50% Injectable 25 Gram(s) IV Push once  insulin glargine Injectable (LANTUS) 11 Unit(s) SubCutaneous at bedtime  insulin lispro (HumaLOG) corrective regimen sliding scale   SubCutaneous Before meals and at bedtime  levothyroxine 75 MICROGram(s) Oral daily  pantoprazole    Tablet 40 milliGRAM(s) Oral before breakfast  phenazopyridine 100 milliGRAM(s) Oral three times a day after meals  piperacillin/tazobactam IVPB. 2.25 Gram(s) IV Intermittent every 8 hours  propranolol 20 milliGRAM(s) Oral <User Schedule>  sucralfate 1 Gram(s) Oral Before meals and at bedtime  tamsulosin 0.4 milliGRAM(s) Oral at bedtime    MEDICATIONS  (PRN):  dextrose Gel 1 Dose(s) Oral once PRN Blood Glucose LESS THAN 70 milliGRAM(s)/deciliter  glucagon  Injectable 1 milliGRAM(s) IntraMuscular once PRN Glucose LESS THAN 70 milligrams/deciliter  hydrALAZINE Injectable 10 milliGRAM(s) IV Push every 8 hours PRN if sbp >170 or dbp >100      LABS:                          9.3    10.2  )-----------( 220      ( 20 Dec 2017 13:37 )             29.2     12-20    142  |  106  |  28.0<H>  ----------------------------<  187<H>  4.7   |  21.0<L>  |  2.21<H>    Ca    10.1      20 Dec 2017 13:37    TPro  5.7<L>  /  Alb  3.2<L>  /  TBili  0.4  /  DBili  x   /  AST  14  /  ALT  10  /  AlkPhos  76  12-20    LIVER FUNCTIONS - ( 20 Dec 2017 13:37 )  Alb: 3.2 g/dL / Pro: 5.7 g/dL / ALK PHOS: 76 U/L / ALT: 10 U/L / AST: 14 U/L / GGT: x               RADIOLOGY & ADDITIONAL STUDIES:    Brain MRI and and MRA studies are without significant findings    < from: MR Cervical Spine No Cont (12.20.17 @ 21:46) >    Abnormal increased T2 signal consistent with edema seen on the right   side of   the cord at C5 and C6 level measuring 5 x 6 mm in axial dimension and 19   mm in   craniocaudal dimension. This lesion shows blooming on the gradient echo   MERGE   images likely due to hemosiderin hemosiderin.  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          ******PRELIMINARY REPORT******    ******PRELIMINARY REPORT******              APOLONIA CORDOVA M.D.;[facility] RADIOLOGIST          < end of copied text >      IMPRESSION & PLAN:    LUE weakness consistent with improving wrist drop -radial nerve palsy.  No evidence of stroke.  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  Dr. Strickland suggests contrast study for further assessment.  Patient has been emipirically placed on decadron. Would continue for a few days and monitor  Neurosurgical eval requested but likely no intervention. Not a good candidate for spinal angio,    Parkinsonism has improved. Would have psych eval indications the meds he was on at admission.

## 2017-12-21 NOTE — PROGRESS NOTE ADULT - PROBLEM SELECTOR PLAN 8
Acute on CKD -3- post renal, obstructive pattern,   Gentle IV hydration competed  Reviewed Renal USG : no hydronephrosis  Avoid nephrotoxic drugs, dose medication to daily CrCl

## 2017-12-21 NOTE — PROGRESS NOTE ADULT - PROBLEM SELECTOR PLAN 1
greater than 24 hrs after initial event- restart out pt dose of Propanolol, hydralazine prn   BP slowly improved to 176/80 HR 58 from 212/58 without medication

## 2017-12-22 LAB
ANION GAP SERPL CALC-SCNC: 14 MMOL/L — SIGNIFICANT CHANGE UP (ref 5–17)
BUN SERPL-MCNC: 28 MG/DL — HIGH (ref 8–20)
CALCIUM SERPL-MCNC: 10.8 MG/DL — HIGH (ref 8.6–10.2)
CHLORIDE SERPL-SCNC: 116 MMOL/L — HIGH (ref 98–107)
CO2 SERPL-SCNC: 20 MMOL/L — LOW (ref 22–29)
CREAT SERPL-MCNC: 2.01 MG/DL — HIGH (ref 0.5–1.3)
GLUCOSE BLDC GLUCOMTR-MCNC: 246 MG/DL — HIGH (ref 70–99)
GLUCOSE BLDC GLUCOMTR-MCNC: 257 MG/DL — HIGH (ref 70–99)
GLUCOSE BLDC GLUCOMTR-MCNC: 322 MG/DL — HIGH (ref 70–99)
GLUCOSE BLDC GLUCOMTR-MCNC: 329 MG/DL — HIGH (ref 70–99)
GLUCOSE BLDC GLUCOMTR-MCNC: 448 MG/DL — HIGH (ref 70–99)
GLUCOSE SERPL-MCNC: 272 MG/DL — HIGH (ref 70–115)
HCT VFR BLD CALC: 33.8 % — LOW (ref 42–52)
HGB BLD-MCNC: 10.7 G/DL — LOW (ref 14–18)
MCHC RBC-ENTMCNC: 29.2 PG — SIGNIFICANT CHANGE UP (ref 27–31)
MCHC RBC-ENTMCNC: 31.7 G/DL — LOW (ref 32–36)
MCV RBC AUTO: 92.1 FL — SIGNIFICANT CHANGE UP (ref 80–94)
PLATELET # BLD AUTO: 241 K/UL — SIGNIFICANT CHANGE UP (ref 150–400)
POTASSIUM SERPL-MCNC: 4.7 MMOL/L — SIGNIFICANT CHANGE UP (ref 3.5–5.3)
POTASSIUM SERPL-SCNC: 4.7 MMOL/L — SIGNIFICANT CHANGE UP (ref 3.5–5.3)
RBC # BLD: 3.67 M/UL — LOW (ref 4.6–6.2)
RBC # FLD: 15.3 % — SIGNIFICANT CHANGE UP (ref 11–15.6)
SODIUM SERPL-SCNC: 150 MMOL/L — HIGH (ref 135–145)
WBC # BLD: 9.3 K/UL — SIGNIFICANT CHANGE UP (ref 4.8–10.8)
WBC # FLD AUTO: 9.3 K/UL — SIGNIFICANT CHANGE UP (ref 4.8–10.8)

## 2017-12-22 PROCEDURE — 99232 SBSQ HOSP IP/OBS MODERATE 35: CPT

## 2017-12-22 PROCEDURE — 99233 SBSQ HOSP IP/OBS HIGH 50: CPT

## 2017-12-22 RX ORDER — QUETIAPINE FUMARATE 200 MG/1
25 TABLET, FILM COATED ORAL
Qty: 0 | Refills: 0 | Status: DISCONTINUED | OUTPATIENT
Start: 2017-12-22 | End: 2018-01-02

## 2017-12-22 RX ORDER — HYDRALAZINE HCL 50 MG
10 TABLET ORAL
Qty: 0 | Refills: 0 | Status: DISCONTINUED | OUTPATIENT
Start: 2017-12-22 | End: 2017-12-22

## 2017-12-22 RX ORDER — HALOPERIDOL DECANOATE 100 MG/ML
0.5 INJECTION INTRAMUSCULAR EVERY 8 HOURS
Qty: 0 | Refills: 0 | Status: DISCONTINUED | OUTPATIENT
Start: 2017-12-22 | End: 2017-12-22

## 2017-12-22 RX ORDER — INSULIN GLARGINE 100 [IU]/ML
22 INJECTION, SOLUTION SUBCUTANEOUS AT BEDTIME
Qty: 0 | Refills: 0 | Status: DISCONTINUED | OUTPATIENT
Start: 2017-12-22 | End: 2017-12-26

## 2017-12-22 RX ADMIN — Medication 10 MILLIGRAM(S): at 11:30

## 2017-12-22 RX ADMIN — PANTOPRAZOLE SODIUM 40 MILLIGRAM(S): 20 TABLET, DELAYED RELEASE ORAL at 11:25

## 2017-12-22 RX ADMIN — Medication 20 MILLIGRAM(S): at 16:33

## 2017-12-22 RX ADMIN — Medication: at 09:30

## 2017-12-22 RX ADMIN — Medication: at 11:47

## 2017-12-22 RX ADMIN — PIPERACILLIN AND TAZOBACTAM 200 GRAM(S): 4; .5 INJECTION, POWDER, LYOPHILIZED, FOR SOLUTION INTRAVENOUS at 11:29

## 2017-12-22 RX ADMIN — Medication 4 MILLIGRAM(S): at 22:50

## 2017-12-22 RX ADMIN — Medication 100 MILLIGRAM(S): at 22:50

## 2017-12-22 RX ADMIN — Medication 100 MILLIGRAM(S): at 16:33

## 2017-12-22 RX ADMIN — Medication 4 MILLIGRAM(S): at 16:12

## 2017-12-22 RX ADMIN — Medication: at 17:19

## 2017-12-22 RX ADMIN — Medication 1 GRAM(S): at 22:50

## 2017-12-22 RX ADMIN — Medication 20 MILLIGRAM(S): at 11:39

## 2017-12-22 RX ADMIN — QUETIAPINE FUMARATE 25 MILLIGRAM(S): 200 TABLET, FILM COATED ORAL at 18:42

## 2017-12-22 RX ADMIN — INSULIN GLARGINE 22 UNIT(S): 100 INJECTION, SOLUTION SUBCUTANEOUS at 23:20

## 2017-12-22 RX ADMIN — Medication 75 MICROGRAM(S): at 05:18

## 2017-12-22 RX ADMIN — Medication 4 MILLIGRAM(S): at 05:18

## 2017-12-22 RX ADMIN — PIPERACILLIN AND TAZOBACTAM 200 GRAM(S): 4; .5 INJECTION, POWDER, LYOPHILIZED, FOR SOLUTION INTRAVENOUS at 18:43

## 2017-12-22 RX ADMIN — Medication 1 GRAM(S): at 11:26

## 2017-12-22 RX ADMIN — PIPERACILLIN AND TAZOBACTAM 200 GRAM(S): 4; .5 INJECTION, POWDER, LYOPHILIZED, FOR SOLUTION INTRAVENOUS at 00:32

## 2017-12-22 RX ADMIN — CINACALCET 30 MILLIGRAM(S): 30 TABLET, FILM COATED ORAL at 16:13

## 2017-12-22 RX ADMIN — TAMSULOSIN HYDROCHLORIDE 0.4 MILLIGRAM(S): 0.4 CAPSULE ORAL at 22:51

## 2017-12-22 RX ADMIN — ATORVASTATIN CALCIUM 40 MILLIGRAM(S): 80 TABLET, FILM COATED ORAL at 22:50

## 2017-12-22 RX ADMIN — AMLODIPINE BESYLATE 10 MILLIGRAM(S): 2.5 TABLET ORAL at 05:19

## 2017-12-22 RX ADMIN — Medication 100 MILLIGRAM(S): at 11:29

## 2017-12-22 RX ADMIN — Medication 1 GRAM(S): at 16:12

## 2017-12-22 RX ADMIN — Medication 1 GRAM(S): at 18:43

## 2017-12-22 RX ADMIN — Medication: at 23:21

## 2017-12-22 RX ADMIN — Medication 1 MILLIGRAM(S): at 16:12

## 2017-12-22 NOTE — ED ADULT NURSE REASSESSMENT NOTE - COMFORT CARE
warm blanket provided
plan of care explained/side rails up/warm blanket provided
side rails up
plan of care explained
plan of care explained
repositioned/side rails up/warm blanket provided

## 2017-12-22 NOTE — PROGRESS NOTE ADULT - SUBJECTIVE AND OBJECTIVE BOX
MASOUD ELLIS    [id number]    67y      Male    HPI:  History obtained from chart as patient is currently obtunded status post Ativan for MRI studies. He is a 67 year old male who was sent from [facility] for new onset left wrist drop and left facial droop. Last seen normal at 9 morning.   · Negative Findings: no blurred vision  · Timing: sudden onset  · Duration: hour(s)  4  · Severity: MILD  · Context: unknown  · Witnessed By: nursing home staff  · Aggravating Factors: none  · Relieving Factors: none    ICU consulted while patient was in ER for Hypertensive encephalopathy on nicardipine gtt, w/ 's. Nicardipine drip was started without trial of injectable anti-HTN meds. 20mg IVP of labetalol was given and titrate down/ turn off drip after IV push. (SBP remained 150's.)  interval:  This morning, pt is alert x 2 in no distress appears confused but this could be baseline. Slow to answer questions. Pt with no complaints. Does not appear to have facial droop.  Follows commands.  + Chronic gama.  Pt reports he ambulates normally with a cane.  When noted that his rt ankle appeared slightly swollen when compared to lt - he said he may have banged it.  Pt able to eat applesauce without difficulty but will order swallow eval.    REVIEW OF SYSTEMS:    CONSTITUTIONAL: No fever, weight loss, or fatigue  RESPIRATORY: No cough, wheezing, hemoptysis; No shortness of breath  CARDIOVASCULAR: No chest pain, palpitations  GASTROINTESTINAL: No abdominal or epigastric pain. No nausea, vomiting  NEUROLOGICAL: No headaches, + memory loss,+ loss of strength.      PHYSICAL EXAM:    Vital Signs Last 24 Hrs  T(C): 36.9 (22 Dec 2017 07:20), Max: 37.3 (21 Dec 2017 20:15)  T(F): 98.4 (22 Dec 2017 07:20), Max: 99.1 (21 Dec 2017 20:15)  HR: 109 (22 Dec 2017 11:49) (62 - 109)  BP: 160/77 (22 Dec 2017 11:49) (160/77 - 189/92)  BP(mean): --  RR: 20 (22 Dec 2017 11:49) (18 - 20)  SpO2: 99% (22 Dec 2017 11:49) (96% - 99%)    GENERAL: NAD, A & O x 2, calm   HEENT: PERRL, +EOMI  NECK: soft, Supple, No JVD,   CHEST/LUNG: Clear to ascultation bilaterally; No wheezing  HEART: S1S2+, Regular rate and rhythm; No murmurs, rubs, or gallops  ABDOMEN: Soft, Nontender, Nondistended; Bowel sounds present  EXTREMITIES:  2+ Peripheral Pulses, No clubbing, cyanosis, + Rt. ankle small amt of swelling compared to Lt., Lt wrist drop 4/5   SKIN: No rashes or lesions  NEURO: AAOX2, no facial droop, Rt wrist decreased strength compared to Lt., + bradykinesia, no rigidity  PSYCH: normal mood      LABS:                                         10.7   9.3   )-----------( 241      ( 22 Dec 2017 08:32 )             33.8       12-22    150<H>  |  116<H>  |  28.0<H>  ----------------------------<  272<H>  4.7   |  20.0<L>  |  2.01<H>    Ca    10.8<H>      22 Dec 2017 08:32    TPro  5.7<L>  /  Alb  3.2<L>  /  TBili  0.4  /  DBili  x   /  AST  14  /  ALT  10  /  AlkPhos  76  12-20         PTT - ( 20 Dec 2017 13:37 )  PTT:29.3 sec    Trop 0.02    Urinalysis Basic - ( 20 Dec 2017 15:05 )    Color: Yellow / Appearance: Clear / S.010 / pH: x  Gluc: x / Ketone: Negative  / Bili: Negative / Urobili: Negative mg/dL   Blood: x / Protein: 30 mg/dL / Nitrite: Negative   Leuk Esterase: Moderate / RBC: 0-2 /HPF / WBC 3-5   Sq Epi: x / Non Sq Epi: Occasional / Bacteria: x    Urine culture shows no growth    MEDICATIONS  (STANDING):  amLODIPine   Tablet 10 milliGRAM(s) Oral daily  atorvastatin 40 milliGRAM(s) Oral at bedtime  cinacalcet 30 milliGRAM(s) Oral daily  dexamethasone  Injectable 4 milliGRAM(s) IV Push every 8 hours  dextrose 5%. 1000 milliLiter(s) (50 mL/Hr) IV Continuous <Continuous>  dextrose 50% Injectable 12.5 Gram(s) IV Push once  dextrose 50% Injectable 25 Gram(s) IV Push once  dextrose 50% Injectable 25 Gram(s) IV Push once  insulin glargine Injectable (LANTUS) 11 Unit(s) SubCutaneous at bedtime  insulin lispro (HumaLOG) corrective regimen sliding scale   SubCutaneous Before meals and at bedtime  levothyroxine 75 MICROGram(s) Oral daily  pantoprazole    Tablet 40 milliGRAM(s) Oral before breakfast  phenazopyridine 100 milliGRAM(s) Oral three times a day after meals  piperacillin/tazobactam IVPB. 2.25 Gram(s) IV Intermittent every 8 hours  propranolol 20 milliGRAM(s) Oral <User Schedule>  sucralfate 1 Gram(s) Oral Before meals and at bedtime  tamsulosin 0.4 milliGRAM(s) Oral at bedtime    MEDICATIONS  (PRN):  dextrose Gel 1 Dose(s) Oral once PRN Blood Glucose LESS THAN 70 milliGRAM(s)/deciliter  glucagon  Injectable 1 milliGRAM(s) IntraMuscular once PRN Glucose LESS THAN 70 milligrams/deciliter  hydrALAZINE Injectable 10 milliGRAM(s) IV Push every 8 hours PRN if sbp >170 or dbp >100    RADIOLOGY & ADDITIONAL TESTS:    CXR:  No evidence of active chest disease.    MR cervical spine:  Abnormal increased T2 signal consistent in the right side of the cord at   C5 and C6 level measuring 5 x 6 mm in axial dimension and 19 mm in   craniocaudal dimension. This lesion shows blooming on the gradient echo   MERGE images likely due to hemosiderin hemosiderin. Postcontrast images   are recommended. Preliminary report provided by APOLONIA CORDOVA M.D.;[facility]   RADIOLOGIST.     MRA Head:  Normal branch occlusion or hemodynamically significant stenosis.    MRA Neck:  No hemodynamically significant stenosis at the origin of the internal   carotid arteries.    MRI Head:  Diffusion restriction to represent acute infarct.    CT Head:  No parenchymal contusion, hemorrhage or extra-axial collection.  No CT evidence of an acute territorial infarction.      Neurology consult appreciated MASOUD ELLIS    [id number]    67y      Male    HPI:  History obtained from chart as patient is currently obtunded status post Ativan for MRI studies. He is a 67 year old male who was sent from [facility] for new onset left wrist drop and left facial droop. Last seen normal at 9 morning.   · Negative Findings: no blurred vision  · Timing: sudden onset  · Duration: hour(s)  4  · Severity: MILD  · Context: unknown  · Witnessed By: nursing home staff  · Aggravating Factors: none  · Relieving Factors: none    ICU consulted while patient was in ER for Hypertensive encephalopathy on nicardipine gtt, w/ 's. Nicardipine drip was started without trial of injectable anti-HTN meds. 20mg IVP of labetalol was given and titrate down/ turn off drip after IV push. (SBP remained 150's.)  interval:  This morning, pt is alert x 2 in no distress appears confused and trying to get out bed. wanted to go home.  Slow to answer questions. Pt with no complaints. Does not appear to have facial droop.  Follows commands intermitently.  + Chronic gama.  Pt reports he ambulates normally with a cane. s/p swallow eval. seen by neuro.       REVIEW OF SYSTEMS:    unreliable. confusion.   CONSTITUTIONAL: No fever, weight loss, or fatigue  RESPIRATORY: No cough, wheezing, hemoptysis; No shortness of breath  CARDIOVASCULAR: No chest pain, palpitations  GASTROINTESTINAL: No abdominal or epigastric pain. No nausea, vomiting  NEUROLOGICAL: No headaches, + memory loss,+ loss of strength.      PHYSICAL EXAM:    Vital Signs Last 24 Hrs  T(C): 36.9 (22 Dec 2017 07:20), Max: 37.3 (21 Dec 2017 20:15)  T(F): 98.4 (22 Dec 2017 07:20), Max: 99.1 (21 Dec 2017 20:15)  HR: 109 (22 Dec 2017 11:49) (62 - 109)  BP: 160/77 (22 Dec 2017 11:49) (160/77 - 189/92)  BP(mean): --  RR: 20 (22 Dec 2017 11:49) (18 - 20)  SpO2: 99% (22 Dec 2017 11:49) (96% - 99%)    GENERAL: NAD, A & O x 1  HEENT: PERRL, +EOMI  NECK: soft, Supple, No JVD,   CHEST/LUNG: Clear to ascultation bilaterally; No wheezing  HEART: S1S2+, Regular rate and rhythm; No murmurs, rubs, or gallops  ABDOMEN: Soft, Nontender, Nondistended; Bowel sounds present  EXTREMITIES:  2+ Peripheral Pulses, No clubbing, cyanosis, + Rt. ankle small amt of swelling compared to Lt., Lt wrist drop 4/5   SKIN: No rashes or lesions  NEURO: AAOX2, no facial droop, Rt wrist decreased strength compared to Lt., + bradykinesia, no rigidity  PSYCH: normal mood      LABS:                                         10.7   9.3   )-----------( 241      ( 22 Dec 2017 08:32 )             33.8       12-    150<H>  |  116<H>  |  28.0<H>  ----------------------------<  272<H>  4.7   |  20.0<L>  |  2.01<H>    Ca    10.8<H>      22 Dec 2017 08:32    TPro  5.7<L>  /  Alb  3.2<L>  /  TBili  0.4  /  DBili  x   /  AST  14  /  ALT  10  /  AlkPhos  76  12-20         PTT - ( 20 Dec 2017 13:37 )  PTT:29.3 sec    Trop 0.02    Urinalysis Basic - ( 20 Dec 2017 15:05 )    Color: Yellow / Appearance: Clear / S.010 / pH: x  Gluc: x / Ketone: Negative  / Bili: Negative / Urobili: Negative mg/dL   Blood: x / Protein: 30 mg/dL / Nitrite: Negative   Leuk Esterase: Moderate / RBC: 0-2 /HPF / WBC 3-5   Sq Epi: x / Non Sq Epi: Occasional / Bacteria: x    Urine culture shows no growth    MEDICATIONS  (STANDING):  amLODIPine   Tablet 10 milliGRAM(s) Oral daily  atorvastatin 40 milliGRAM(s) Oral at bedtime  cinacalcet 30 milliGRAM(s) Oral daily  dexamethasone  Injectable 4 milliGRAM(s) IV Push every 8 hours  dextrose 5%. 1000 milliLiter(s) (50 mL/Hr) IV Continuous <Continuous>  dextrose 50% Injectable 12.5 Gram(s) IV Push once  dextrose 50% Injectable 25 Gram(s) IV Push once  dextrose 50% Injectable 25 Gram(s) IV Push once  insulin glargine Injectable (LANTUS) 11 Unit(s) SubCutaneous at bedtime  insulin lispro (HumaLOG) corrective regimen sliding scale   SubCutaneous Before meals and at bedtime  levothyroxine 75 MICROGram(s) Oral daily  pantoprazole    Tablet 40 milliGRAM(s) Oral before breakfast  phenazopyridine 100 milliGRAM(s) Oral three times a day after meals  piperacillin/tazobactam IVPB. 2.25 Gram(s) IV Intermittent every 8 hours  propranolol 20 milliGRAM(s) Oral <User Schedule>  sucralfate 1 Gram(s) Oral Before meals and at bedtime  tamsulosin 0.4 milliGRAM(s) Oral at bedtime    MEDICATIONS  (PRN):  dextrose Gel 1 Dose(s) Oral once PRN Blood Glucose LESS THAN 70 milliGRAM(s)/deciliter  glucagon  Injectable 1 milliGRAM(s) IntraMuscular once PRN Glucose LESS THAN 70 milligrams/deciliter  hydrALAZINE Injectable 10 milliGRAM(s) IV Push every 8 hours PRN if sbp >170 or dbp >100    RADIOLOGY & ADDITIONAL TESTS:    CXR:  No evidence of active chest disease.    MR cervical spine:  Abnormal increased T2 signal consistent in the right side of the cord at   C5 and C6 level measuring 5 x 6 mm in axial dimension and 19 mm in   craniocaudal dimension. This lesion shows blooming on the gradient echo   MERGE images likely due to hemosiderin hemosiderin. Postcontrast images   are recommended. Preliminary report provided by APOLONIA CORDOVA M.D.;[facility]   RADIOLOGIST.     MRA Head:  Normal branch occlusion or hemodynamically significant stenosis.    MRA Neck:  No hemodynamically significant stenosis at the origin of the internal   carotid arteries.    MRI Head:  Diffusion restriction to represent acute infarct.    CT Head:  No parenchymal contusion, hemorrhage or extra-axial collection.  No CT evidence of an acute territorial infarction.      Neurology consult appreciated MASOUD ELLIS    [id number]    67y      Male    HPI:  History obtained from chart as patient is currently obtunded status post Ativan for MRI studies. He is a 67 year old male who was sent from [facility] for new onset left wrist drop and left facial droop. Last seen normal at 9 morning.   · Negative Findings: no blurred vision  · Timing: sudden onset  · Duration: hour(s)  4  · Severity: MILD  · Context: unknown  · Witnessed By: nursing home staff  · Aggravating Factors: none  · Relieving Factors: none    ICU consulted while patient was in ER for Hypertensive encephalopathy on nicardipine gtt, w/ 's. Nicardipine drip was started without trial of injectable anti-HTN meds. 20mg IVP of labetalol was given and titrate down/ turn off drip after IV push. (SBP remained 150's.)  interval:  This morning, pt is alert x 2 in no distress appears confused and trying to get out bed. wanted to go home.  Slow to answer questions. Pt with no complaints. Does not appear to have facial droop.  Follows commands intermitently.  + Chronic gama.  Pt reports he ambulates normally with a cane. s/p swallow eval. seen by neuro.       REVIEW OF SYSTEMS:    unreliable. confusion.   CONSTITUTIONAL: No fever, weight loss, or fatigue  RESPIRATORY: No cough, wheezing, hemoptysis; No shortness of breath  CARDIOVASCULAR: No chest pain, palpitations  GASTROINTESTINAL: No abdominal or epigastric pain. No nausea, vomiting  NEUROLOGICAL: No headaches, + memory loss,+ loss of strength.      PHYSICAL EXAM:    Vital Signs Last 24 Hrs  T(C): 36.9 (22 Dec 2017 07:20), Max: 37.3 (21 Dec 2017 20:15)  T(F): 98.4 (22 Dec 2017 07:20), Max: 99.1 (21 Dec 2017 20:15)  HR: 109 (22 Dec 2017 11:49) (62 - 109)  BP: 160/77 (22 Dec 2017 11:49) (160/77 - 189/92)  BP(mean): --  RR: 20 (22 Dec 2017 11:49) (18 - 20)  SpO2: 99% (22 Dec 2017 11:49) (96% - 99%)    GENERAL: NAD, A & O x 1  HEENT: PERRL, +EOMI  NECK: soft, Supple, No JVD,   CHEST/LUNG: Clear to ascultation bilaterally; No wheezing  HEART: S1S2+, Regular rate and rhythm; No murmurs, rubs, or gallops  ABDOMEN: Soft, Nontender, Nondistended; Bowel sounds present  EXTREMITIES:  2+ Peripheral Pulses, No clubbing, cyanosis, + Rt. ankle small amt of swelling compared to Lt., Lt wrist drop 4/5   SKIN: No rashes or lesions  NEURO: AAOX2, no facial droop, Rt wrist decreased strength compared to Lt., + bradykinesia, no rigidity  PSYCH: normal mood      LABS:                                         10.7   9.3   )-----------( 241      ( 22 Dec 2017 08:32 )             33.8       12-    150<H>  |  116<H>  |  28.0<H>  ----------------------------<  272<H>  4.7   |  20.0<L>  |  2.01<H>    Ca    10.8<H>      22 Dec 2017 08:32    TPro  5.7<L>  /  Alb  3.2<L>  /  TBili  0.4  /  DBili  x   /  AST  14  /  ALT  10  /  AlkPhos  76  12-20         PTT - ( 20 Dec 2017 13:37 )  PTT:29.3 sec    Trop 0.02    Urinalysis Basic - ( 20 Dec 2017 15:05 )    Color: Yellow / Appearance: Clear / S.010 / pH: x  Gluc: x / Ketone: Negative  / Bili: Negative / Urobili: Negative mg/dL   Blood: x / Protein: 30 mg/dL / Nitrite: Negative   Leuk Esterase: Moderate / RBC: 0-2 /HPF / WBC 3-5   Sq Epi: x / Non Sq Epi: Occasional / Bacteria: x    Urine culture shows no growth    MEDICATIONS  (STANDING):  amLODIPine   Tablet 10 milliGRAM(s) Oral daily  atorvastatin 40 milliGRAM(s) Oral at bedtime  cinacalcet 30 milliGRAM(s) Oral daily  dexamethasone  Injectable 4 milliGRAM(s) IV Push every 8 hours  dextrose 5%. 1000 milliLiter(s) (50 mL/Hr) IV Continuous <Continuous>  dextrose 50% Injectable 12.5 Gram(s) IV Push once  dextrose 50% Injectable 25 Gram(s) IV Push once  dextrose 50% Injectable 25 Gram(s) IV Push once  insulin glargine Injectable (LANTUS) 11 Unit(s) SubCutaneous at bedtime  insulin lispro (HumaLOG) corrective regimen sliding scale   SubCutaneous Before meals and at bedtime  levothyroxine 75 MICROGram(s) Oral daily  pantoprazole    Tablet 40 milliGRAM(s) Oral before breakfast  phenazopyridine 100 milliGRAM(s) Oral three times a day after meals  piperacillin/tazobactam IVPB. 2.25 Gram(s) IV Intermittent every 8 hours  propranolol 20 milliGRAM(s) Oral <User Schedule>  sucralfate 1 Gram(s) Oral Before meals and at bedtime  tamsulosin 0.4 milliGRAM(s) Oral at bedtime    MEDICATIONS  (PRN):  dextrose Gel 1 Dose(s) Oral once PRN Blood Glucose LESS THAN 70 milliGRAM(s)/deciliter  glucagon  Injectable 1 milliGRAM(s) IntraMuscular once PRN Glucose LESS THAN 70 milligrams/deciliter  hydrALAZINE Injectable 10 milliGRAM(s) IV Push every 8 hours PRN if sbp >170 or dbp >100    RADIOLOGY & ADDITIONAL TESTS:    CXR:  No evidence of active chest disease.    MR cervical spine:  Abnormal increased T2 signal consistent in the right side of the cord at   C5 and C6 level measuring 5 x 6 mm in axial dimension and 19 mm in   craniocaudal dimension. This lesion shows blooming on the gradient echo   MERGE images likely due to hemosiderin hemosiderin. Postcontrast images   are recommended. Preliminary report provided by APOLONIA CORDOVA M.D.;[facility]   RADIOLOGIST.     MRA Head:  Normal branch occlusion or hemodynamically significant stenosis.    MRA Neck:  No hemodynamically significant stenosis at the origin of the internal   carotid arteries.    MRI Head:  Diffusion restriction to represent acute infarct.    CT Head:  No parenchymal contusion, hemorrhage or extra-axial collection.  No CT evidence of an acute territorial infarction.      < from: TTE Echo Complete w/Doppler (17 @ 12:54) >   Summary:   1. Normal left ventricular internal cavity size.   2. Normal global left ventricular systolic function.   3. Left ventricular ejection fraction, by visual estimation, is 60 to   65%.   4. Spectral Doppler shows impaired relaxation pattern of left   ventricular myocardial filling (Grade I diastolic dysfunction).   5. Normal right ventricular size and function.   6. The left atrium is normal in size.   7. The right atrium is normal in size.   8. Structurally normal mitral valve, with normal leaflet excursion.   9. Sclerotic aortic valve with normal opening.  10. There is no evidence of pericardial effusion.     MD Lizy Electronically signed on 2017 at 10:33:32 PM    < end of copied text >

## 2017-12-22 NOTE — ED ADULT NURSE REASSESSMENT NOTE - NS ED NURSE REASSESS COMMENT FT1
Dr nelson at bedside for evaluation, informed pt continues to climb out of bed. pt medicated as ordered,  gama draining well.1:1 remains in progress for safety will continue to monitor

## 2017-12-22 NOTE — ED ADULT NURSE REASSESSMENT NOTE - NS ED NURSE REASSESS COMMENT FT1
sarah from speech/swallow at bedside for consult. Dr nelson paged re medication for agitation and pt refused his Lab draw

## 2017-12-22 NOTE — ED ADULT NURSE REASSESSMENT NOTE - FACIAL SYMMETRY
symmetrical
left facial droop
symmetrical
left facial droop
symmetrical
symmetrical

## 2017-12-22 NOTE — ED ADULT NURSE REASSESSMENT NOTE - NS ED NURSE REASSESS COMMENT FT1
Pt reassessed.  Denies physical complaints.  Pt has 1:1 observation in progress for safety.  Hardin in place.  Orange urine noted.  No acute distress.  NS on cardiac monitor.

## 2017-12-22 NOTE — PROGRESS NOTE ADULT - PROBLEM SELECTOR PLAN 1
greater than 24 hrs after initial event- on amlodipine, op dose of Propanolol, hydralazine prn   BP slowly improved to 176/80 HR 58 from 212/58 without medication greater than 24 hrs after initial event- on amlodipine, op dose of Propanolol, hydralazine prn   BP slowly improving. greater than 24 hrs after initial event- on amlodipine, op dose of Propanolol, hydralazine prn   BP slowly improving.  patient aagitated, possible delirium with/out dementia. sitter, ativan PRN low dose greater than 24 hrs after initial event- on amlodipine, op dose of Propanolol, hydralazine prn   BP slowly improving. adjust meds  patient aagitated, possible delirium with/out dementia. sitter, ativan PRN low dose

## 2017-12-22 NOTE — PROGRESS NOTE ADULT - PROBLEM SELECTOR PLAN 8
Acute on CKD -3- post renal, obstructive pattern,   Gentle IV hydration competed  Reviewed Renal USG : no hydronephrosis  Avoid nephrotoxic drugs, dose medication to daily CrCl  creatinine worsening. monitor cerat. call renal if worsening. Acute on CKD -3- post renal, obstructive pattern,   Gentle IV hydration completed  Reviewed Renal USG : no hydronephrosis  Avoid nephrotoxic drugs, dose medication to daily CrCl  creatinine worsening. monitor cerat. call renal if worsening.

## 2017-12-22 NOTE — ED ADULT NURSE REASSESSMENT NOTE - GENERAL PATIENT STATE
comfortable appearance
cooperative/anxious
comfortable appearance
cooperative
comfortable appearance

## 2017-12-22 NOTE — SWALLOW BEDSIDE ASSESSMENT ADULT - ORAL PHASE
Within functional limits Delayed oral transit time/Decreased anterior-posterior movement of the bolus/impaired cognition impacting oral phase

## 2017-12-22 NOTE — ED ADULT NURSE REASSESSMENT NOTE - NURSING NEURO ORIENTATION
disoriented to place/disoriented to time/situation
disoriented to place/disoriented to time/situation
disoriented to place
disoriented to time/situation/disoriented to place
disoriented to time/situation/disoriented to place
disoriented to place
disoriented to place/disoriented to time/situation

## 2017-12-22 NOTE — PROGRESS NOTE ADULT - SUBJECTIVE AND OBJECTIVE BOX
INTERVAL EVENTS:  Patient w/intermittent agitation. Overall more awake and cooperative. Reports "yes" when asked if neck pain is old. History remains unreliable. No complaints.    Vital Signs Last 24 Hrs  T(C): 36.9 (22 Dec 2017 11:49), Max: 37.3 (21 Dec 2017 20:15)  T(F): 98.4 (22 Dec 2017 11:49), Max: 99.1 (21 Dec 2017 20:15)  HR: 109 (22 Dec 2017 11:49) (66 - 109)  BP: 160/77 (22 Dec 2017 11:49) (160/77 - 189/92)  RR: 20 (22 Dec 2017 11:49) (18 - 20)  SpO2: 99% (22 Dec 2017 11:49) (96% - 99%)  PHYSICAL EXAM:  GENERAL: NAD, frail, generalized atrophy  HEAD:  Atraumatic, normocephalic  EYES: Conjunctiva and sclera clear  ENMT:  moist mucous membranes  NECK: Supple, no JVD, no paraspinous tenderness or palpable step offs  MENTAL STATUS: Awake, nonverbal other than selective "yes/no", intermittent difficulty following one-step commands, face is symmetric, EOMI, pupils are 1mm symmetric and reactive  MOTOR: 4/5 left wrist extension, no noted thenar atrophy, holds arms in the air with high frequency low amplitude bilateral intention tremors, full strength BLE, +bradykinesia, no rigidity  COORDINATION: unable to test dysmetria  SENSATION: withdrawals to light touch all extremities  PLANTAR: downgoing  CHEST/LUNG: Clear to auscultation bilaterally  HEART: +S1/+S2; Regular rate and rhythm  ABDOMEN: Soft, nontender, nondistended  EXTREMITIES:  2+ peripheral pulses  SKIN: Warm, dry; no rashes or lesions, scattered LE abrasions    LABS:             10.7   9.3   )-----------( 241                 33.8     150<H>  |  116<H>  |  28.0<H>  ----------------------------<  272<H>  4.7   |  20.0<L>  |  2.01<H>    Ca    10.8<H>      RADIOLOGY & ADDITIONAL STUDIES:  MR Cervical Spine No Cont (12.20.17 @ 21:46)   IMPRESSION:   Abnormal increased T2 signal consistent in the right side of the cord at   C5 and C6 level measuring 5 x 6 mm in axial dimension and 19 mm in   craniocaudal dimension. This lesion shows blooming on the gradient echo   MERGE images likely due to hemosiderin hemosiderin. Postcontrast images   are recommended.    MR Angio Head No Cont (12.20.17 @ 21:46)  IMPRESSION:       Normal branch occlusion or hemodynamically significant stenosis.    MR Angio Neck No Cont (12.20.17 @ 21:46)  IMPRESSION:       No hemodynamically significant stenosis at the origin of the internal   carotid arteries.

## 2017-12-22 NOTE — PROGRESS NOTE ADULT - PROBLEM SELECTOR PLAN 6
srart. Seroquel, Trazodone, Buspirone due to prolonged obtunded mental status after ativan, cont 1:1 observation for pt safety, f/u Psych consult. kya concerned about dementia on top delirium srart. Seroquel, hold Trazodone, Buspirone due to prolonged obtunded mental status after ativan, cont 1:1 observation for pt safety, f/u Psych consult. kya concerned about dementia on top delirium srart. Seroquel, hold Trazodone, Buspirone. cont 1:1 observation for pt safety, f/u Psych consult. kya concerned about dementia on top delirium. one stat dose of ativan 0.5 mg ordered for agitation. monitor mental status. consider restrain.

## 2017-12-22 NOTE — ED ADULT NURSE REASSESSMENT NOTE - NS ED NURSE REASSESS COMMENT FT1
pt received asleep, easily awakened with 1:1 in progress for safety. pt with bilateral iv accesses, no signs of infiltration. no anxiety or agitation noted at this time. pt's breathing is even and unlaboured, no complaints of pain at this  time. will continue to monitor

## 2017-12-22 NOTE — ED ADULT NURSE REASSESSMENT NOTE - STATUS
awaiting bed, no change
awaiting bed assignment
awaiting bed, no change
awaiting consult
awaiting bed, no change

## 2017-12-22 NOTE — SWALLOW BEDSIDE ASSESSMENT ADULT - SWALLOW EVAL: RECOMMENDED FEEDING/EATING TECHNIQUES
crush medication (when feasible)/position upright (90 degrees)/maintain upright posture during/after eating for 30 mins/oral hygiene/check mouth frequently for oral residue/pocketing

## 2017-12-22 NOTE — PROGRESS NOTE ADULT - SUBJECTIVE AND OBJECTIVE BOX
INTERVAL HISTORY:  improved. Awake, cooperative      VITAL SIGNS:  Vital Signs Last 24 Hrs  T(C): 36.9 (22 Dec 2017 07:20), Max: 37.3 (21 Dec 2017 20:15)  T(F): 98.4 (22 Dec 2017 07:20), Max: 99.1 (21 Dec 2017 20:15)  HR: 85 (22 Dec 2017 07:34) (60 - 85)  BP: 182/88 (22 Dec 2017 07:34) (167/97 - 211/90)  BP(mean): --  RR: 20 (22 Dec 2017 07:34) (18 - 20)  SpO2: 98% (22 Dec 2017 07:34) (96% - 100%)    PHYSICAL EXAMINATION:    Mentation:  awake, oriented to name  Language/Speech: nl  CN: nl  Visual Fields: full  Motor: 3/5 left wrist and finger extensors ,5/5 Left tricps, biceps, Intact on Right  Sensory:  DTR: 2 + KJ  Babinski: plantar, tone normal      MEDS:  MEDICATIONS  (STANDING):  amLODIPine   Tablet 10 milliGRAM(s) Oral daily  atorvastatin 40 milliGRAM(s) Oral at bedtime  cinacalcet 30 milliGRAM(s) Oral daily  dexamethasone  Injectable 4 milliGRAM(s) IV Push every 8 hours  dextrose 5%. 1000 milliLiter(s) (50 mL/Hr) IV Continuous <Continuous>  dextrose 50% Injectable 12.5 Gram(s) IV Push once  dextrose 50% Injectable 25 Gram(s) IV Push once  dextrose 50% Injectable 25 Gram(s) IV Push once  hydrALAZINE 10 milliGRAM(s) Oral two times a day  insulin glargine Injectable (LANTUS) 11 Unit(s) SubCutaneous at bedtime  insulin lispro (HumaLOG) corrective regimen sliding scale   SubCutaneous Before meals and at bedtime  levothyroxine 75 MICROGram(s) Oral daily  pantoprazole    Tablet 40 milliGRAM(s) Oral before breakfast  phenazopyridine 100 milliGRAM(s) Oral three times a day after meals  piperacillin/tazobactam IVPB. 2.25 Gram(s) IV Intermittent every 8 hours  propranolol 20 milliGRAM(s) Oral <User Schedule>  sucralfate 1 Gram(s) Oral Before meals and at bedtime  tamsulosin 0.4 milliGRAM(s) Oral at bedtime    MEDICATIONS  (PRN):  dextrose Gel 1 Dose(s) Oral once PRN Blood Glucose LESS THAN 70 milliGRAM(s)/deciliter  glucagon  Injectable 1 milliGRAM(s) IntraMuscular once PRN Glucose LESS THAN 70 milligrams/deciliter  hydrALAZINE Injectable 10 milliGRAM(s) IV Push every 8 hours PRN if sbp >170 or dbp >100      LABS:                          10.7   9.3   )-----------( 241      ( 22 Dec 2017 08:32 )             33.8     12-22    150<H>  |  116<H>  |  28.0<H>  ----------------------------<  272<H>  4.7   |  20.0<L>  |  2.01<H>    Ca    10.8<H>      22 Dec 2017 08:32    TPro  5.7<L>  /  Alb  3.2<L>  /  TBili  0.4  /  DBili  x   /  AST  14  /  ALT  10  /  AlkPhos  76  12-20    LIVER FUNCTIONS - ( 20 Dec 2017 13:37 )  Alb: 3.2 g/dL / Pro: 5.7 g/dL / ALK PHOS: 76 U/L / ALT: 10 U/L / AST: 14 U/L / GGT: x               RADIOLOGY & ADDITIONAL STUDIES:      IMPRESSION & PLAN:    Left wrist drop consistent with radial nerve palsy  Abnormal C spine MRI- old hemorrhage.  Neurosurgical eval appreciated    Plan Continue decadron at current dose for another 2 days then gradual taper over about a week.  MRI c spine with carlos when/if improved renal function  PT/OT  Dispo planing

## 2017-12-22 NOTE — ED ADULT NURSE REASSESSMENT NOTE - NURSING NEURO LEVEL OF CONSCIOUSNESS
follows commands
alert and awake/follows commands
follows commands/alert and awake
follows commands
follows commands

## 2017-12-22 NOTE — PROGRESS NOTE ADULT - PROBLEM SELECTOR PLAN 2
Clinically improved- no facial droop, wrist drop appears to be radial nerve palsy  Being ruled out for CVA- Neurology following-   Results of Head CT, MRI head, MRA Neck- reviewed  Completed 11/2017 lipid (Tchol;123 HLD; 37 LDL 61)and A1C (5.6)  Swallow eval  f/u Carotid dopplers and TTE Clinically improved- no facial droop, wrist drop appears to be radial nerve palsy  Being ruled out for CVA- Neurology following-   Results of Head CT, MRI head, MRA Neck- reviewed  Completed 11/2017 lipid (Tchol;123 HLD; 37 LDL 61)and A1C (5.6)  Swallow eval appreciated.  f/u Carotid dopplers and TTE Clinically improved- no facial droop, wrist drop appears to be radial nerve palsy  Being ruled out for CVA- Neurology following-   Results of Head CT, MRI head, MRA Neck- reviewed  Completed 11/2017 lipid (Tchol;123 HLD; 37 LDL 61)and A1C (5.6)  Swallow eval appreciated.  Carotid dopplers and TTE noted

## 2017-12-22 NOTE — PROGRESS NOTE ADULT - PROBLEM SELECTOR PLAN 5
c/w Lantus and sliding scale   Lantus dose reduced from 22 U SQ to 11u Q HS while pt NPO, BS high resumed home dose. on diet c/w Lantus and sliding scale   Lantus dose increased to home dose of 22 U SQ. c/w Lantus and sliding scale   Lantus dose increased to home dose of 22 U SQ.  watch for hypoglycemia

## 2017-12-23 DIAGNOSIS — N18.3 CHRONIC KIDNEY DISEASE, STAGE 3 (MODERATE): ICD-10-CM

## 2017-12-23 DIAGNOSIS — Z29.9 ENCOUNTER FOR PROPHYLACTIC MEASURES, UNSPECIFIED: ICD-10-CM

## 2017-12-23 DIAGNOSIS — E03.9 HYPOTHYROIDISM, UNSPECIFIED: ICD-10-CM

## 2017-12-23 LAB
ALBUMIN SERPL ELPH-MCNC: 3.7 G/DL — SIGNIFICANT CHANGE UP (ref 3.3–5.2)
ALP SERPL-CCNC: 86 U/L — SIGNIFICANT CHANGE UP (ref 40–120)
ALT FLD-CCNC: 10 U/L — SIGNIFICANT CHANGE UP
ANION GAP SERPL CALC-SCNC: 16 MMOL/L — SIGNIFICANT CHANGE UP (ref 5–17)
AST SERPL-CCNC: 7 U/L — SIGNIFICANT CHANGE UP
BILIRUB SERPL-MCNC: 0.4 MG/DL — SIGNIFICANT CHANGE UP (ref 0.4–2)
BUN SERPL-MCNC: 44 MG/DL — HIGH (ref 8–20)
CALCIUM SERPL-MCNC: 10.5 MG/DL — HIGH (ref 8.6–10.2)
CHLORIDE SERPL-SCNC: 109 MMOL/L — HIGH (ref 98–107)
CO2 SERPL-SCNC: 19 MMOL/L — LOW (ref 22–29)
CREAT SERPL-MCNC: 2.06 MG/DL — HIGH (ref 0.5–1.3)
GLUCOSE BLDC GLUCOMTR-MCNC: 269 MG/DL — HIGH (ref 70–99)
GLUCOSE BLDC GLUCOMTR-MCNC: 350 MG/DL — HIGH (ref 70–99)
GLUCOSE BLDC GLUCOMTR-MCNC: 351 MG/DL — HIGH (ref 70–99)
GLUCOSE BLDC GLUCOMTR-MCNC: 425 MG/DL — HIGH (ref 70–99)
GLUCOSE SERPL-MCNC: 489 MG/DL — HIGH (ref 70–115)
HCT VFR BLD CALC: 34.2 % — LOW (ref 42–52)
HGB BLD-MCNC: 10.9 G/DL — LOW (ref 14–18)
MCHC RBC-ENTMCNC: 29.2 PG — SIGNIFICANT CHANGE UP (ref 27–31)
MCHC RBC-ENTMCNC: 31.9 G/DL — LOW (ref 32–36)
MCV RBC AUTO: 91.7 FL — SIGNIFICANT CHANGE UP (ref 80–94)
PLATELET # BLD AUTO: 280 K/UL — SIGNIFICANT CHANGE UP (ref 150–400)
POTASSIUM SERPL-MCNC: 4.2 MMOL/L — SIGNIFICANT CHANGE UP (ref 3.5–5.3)
POTASSIUM SERPL-SCNC: 4.2 MMOL/L — SIGNIFICANT CHANGE UP (ref 3.5–5.3)
PROT SERPL-MCNC: 6.6 G/DL — SIGNIFICANT CHANGE UP (ref 6.6–8.7)
RBC # BLD: 3.73 M/UL — LOW (ref 4.6–6.2)
RBC # FLD: 15.4 % — SIGNIFICANT CHANGE UP (ref 11–15.6)
SODIUM SERPL-SCNC: 144 MMOL/L — SIGNIFICANT CHANGE UP (ref 135–145)
WBC # BLD: 13.2 K/UL — HIGH (ref 4.8–10.8)
WBC # FLD AUTO: 13.2 K/UL — HIGH (ref 4.8–10.8)

## 2017-12-23 PROCEDURE — 99232 SBSQ HOSP IP/OBS MODERATE 35: CPT

## 2017-12-23 PROCEDURE — 99233 SBSQ HOSP IP/OBS HIGH 50: CPT

## 2017-12-23 RX ORDER — DEXAMETHASONE 0.5 MG/5ML
2 ELIXIR ORAL THREE TIMES A DAY
Qty: 0 | Refills: 0 | Status: DISCONTINUED | OUTPATIENT
Start: 2017-12-23 | End: 2017-12-30

## 2017-12-23 RX ADMIN — Medication 4 MILLIGRAM(S): at 05:55

## 2017-12-23 RX ADMIN — Medication 1 GRAM(S): at 11:31

## 2017-12-23 RX ADMIN — INSULIN GLARGINE 22 UNIT(S): 100 INJECTION, SOLUTION SUBCUTANEOUS at 21:12

## 2017-12-23 RX ADMIN — ATORVASTATIN CALCIUM 40 MILLIGRAM(S): 80 TABLET, FILM COATED ORAL at 21:12

## 2017-12-23 RX ADMIN — Medication 100 MILLIGRAM(S): at 09:35

## 2017-12-23 RX ADMIN — Medication 3: at 08:12

## 2017-12-23 RX ADMIN — PANTOPRAZOLE SODIUM 40 MILLIGRAM(S): 20 TABLET, DELAYED RELEASE ORAL at 05:57

## 2017-12-23 RX ADMIN — Medication 4: at 16:53

## 2017-12-23 RX ADMIN — QUETIAPINE FUMARATE 25 MILLIGRAM(S): 200 TABLET, FILM COATED ORAL at 05:56

## 2017-12-23 RX ADMIN — Medication 10 MILLIGRAM(S): at 06:10

## 2017-12-23 RX ADMIN — PIPERACILLIN AND TAZOBACTAM 200 GRAM(S): 4; .5 INJECTION, POWDER, LYOPHILIZED, FOR SOLUTION INTRAVENOUS at 01:57

## 2017-12-23 RX ADMIN — Medication 2 MILLIGRAM(S): at 14:17

## 2017-12-23 RX ADMIN — Medication 20 MILLIGRAM(S): at 09:35

## 2017-12-23 RX ADMIN — QUETIAPINE FUMARATE 25 MILLIGRAM(S): 200 TABLET, FILM COATED ORAL at 17:00

## 2017-12-23 RX ADMIN — Medication 4: at 21:12

## 2017-12-23 RX ADMIN — AMLODIPINE BESYLATE 10 MILLIGRAM(S): 2.5 TABLET ORAL at 05:55

## 2017-12-23 RX ADMIN — Medication 2 MILLIGRAM(S): at 21:12

## 2017-12-23 RX ADMIN — TAMSULOSIN HYDROCHLORIDE 0.4 MILLIGRAM(S): 0.4 CAPSULE ORAL at 21:12

## 2017-12-23 RX ADMIN — Medication 1 GRAM(S): at 21:12

## 2017-12-23 RX ADMIN — CINACALCET 30 MILLIGRAM(S): 30 TABLET, FILM COATED ORAL at 11:32

## 2017-12-23 RX ADMIN — Medication 75 MICROGRAM(S): at 05:56

## 2017-12-23 RX ADMIN — Medication 6: at 11:37

## 2017-12-23 RX ADMIN — Medication 1 GRAM(S): at 16:41

## 2017-12-23 RX ADMIN — Medication 1 GRAM(S): at 08:12

## 2017-12-23 RX ADMIN — Medication 20 MILLIGRAM(S): at 16:41

## 2017-12-23 NOTE — PROGRESS NOTE ADULT - SUBJECTIVE AND OBJECTIVE BOX
INTERVAL HPI/OVERNIGHT EVENTS:  67y Male PMH bipolar disorder and renal insufficiency admitted 12/20 from [facility] w/ new onset L wrist drop and L facial droop. MRI showed abnormal increased T2 signal on R side of cord at C5-C6. Pending nephrology consult to coordinate with primary care team MRI C-Spine with and without contrast. Patient seen earlier today w/ Dr. Judd, sitting in chair, no complaints, states he is "feeling ok."    Vital Signs Last 24 Hrs  T(C): 36.5 (23 Dec 2017 08:55), Max: 36.7 (22 Dec 2017 18:34)  T(F): 97.7 (23 Dec 2017 08:55), Max: 98 (22 Dec 2017 18:34)  HR: 67 (23 Dec 2017 11:30) (60 - 97)  BP: 137/79 (23 Dec 2017 11:30) (137/79 - 176/87)  BP(mean): 96 (23 Dec 2017 11:30) (92 - 115)  RR: 18 (23 Dec 2017 11:30) (13 - 19)  SpO2: 97% (23 Dec 2017 11:30) (96% - 99%)    PHYSICAL EXAM:  GENERAL: NAD, frail, generalized atrophy  HEAD:  Atraumatic, normocephalic  NECK: Supple  NEURO: Awake, minimally verbal, able to state "i'm feeling ok" and respond to other simple questions, following commands, face is symmetric, PERRL, EOMI; 4/5 left wrist extension, no noted thenar atrophy, RUE full strength, moves b/l lowers to command, strength symmetric  SENSATION: grossly intact to light touch all extremities    LABS:                        10.9   13.2  )-----------( 280      ( 23 Dec 2017 10:42 )             34.2     12-23    144  |  109<H>  |  44.0<H>  ----------------------------<  489<H>  4.2   |  19.0<L>  |  2.06<H>    Ca    10.5<H>      23 Dec 2017 10:42    TPro  6.6  /  Alb  3.7  /  TBili  0.4  /  DBili  x   /  AST  7   /  ALT  10  /  AlkPhos  86  12-23 12-22 @ 07:01  -  12-23 @ 07:00  --------------------------------------------------------  IN: 100 mL / OUT: 1353 mL / NET: -1253 mL    RADIOLOGY & ADDITIONAL TESTS:  - MR Cervical Spine No Cont (12.20.17 @ 21:46)   IMPRESSION:   Abnormal increased T2 signal consistent in the right side of the cord at   C5 and C6 level measuring 5 x 6 mm in axial dimension and 19 mm in   craniocaudal dimension. This lesion shows blooming on the gradient echo   MERGE images likely due to hemosiderin hemosiderin. Postcontrast images   are recommended.    - MR Angio Head No Cont (12.20.17 @ 21:46)  IMPRESSION:       Normal branch occlusion or hemodynamically significant stenosis.    - MR Angio Neck No Cont (12.20.17 @ 21:46)  IMPRESSION:       No hemodynamically significant stenosis at the origin of the internal   carotid arteries.

## 2017-12-23 NOTE — PROGRESS NOTE ADULT - SUBJECTIVE AND OBJECTIVE BOX
MASOUD ELLIS    [id number]    67y      Male    INTERVAL HPI/OVERNIGHT EVENTS: No events on. Offers no complaints.     Hospital course:  66 yo M h/o DM, HTN, psychiatric disease on lithium, prostate s/p surgery, chronic indwelling gama for "nonfunctioning bladder" and urinary retention, parathyroid disease s/p resection and CKD presented for left wrist and facial droop. In the ED, pt was found to have hypertensive emergency requiring nicardipine infusion. MRI neck showed abnormal increased T2 signal consistent in the right side of the cord at C5 and C6 level measuring 5 x 6 mm in axial dimension and 19 mm in   craniocaudal dimension. MRA neck negative for stenosis. MRI brain negative for ischemic injury.     REVIEW OF SYSTEMS:    CONSTITUTIONAL: No fever   RESPIRATORY: No cough   CARDIOVASCULAR: No chest pain       Vital Signs Last 24 Hrs  T(C): 36.5 (23 Dec 2017 08:55), Max: 36.9 (22 Dec 2017 11:49)  T(F): 97.7 (23 Dec 2017 08:55), Max: 98.4 (22 Dec 2017 11:49)  HR: 78 (23 Dec 2017 07:39) (60 - 109)  BP: 139/73 (23 Dec 2017 07:39) (139/73 - 176/87)  BP(mean): 92 (23 Dec 2017 07:39) (92 - 115)  RR: 13 (23 Dec 2017 07:39) (13 - 20)  SpO2: 96% (23 Dec 2017 07:39) (96% - 99%)    PHYSICAL EXAM:    GENERAL: NAD, AAOx 2-3 (self, place, trump is president)  HEENT: PERRL, +EOMI  CHEST/LUNG: Clear to percussion bilaterally   HEART: S1S2+, Regular rate and rhythm   ABDOMEN: Soft, Nontender, Nondistended; Bowel sounds present       LABS:                        10.7   9.3   )-----------( 241      ( 22 Dec 2017 08:32 )             33.8     12-22    150<H>  |  116<H>  |  28.0<H>  ----------------------------<  272<H>  4.7   |  20.0<L>  |  2.01<H>    Ca    10.8<H>      22 Dec 2017 08:32              MEDICATIONS  (STANDING):  amLODIPine   Tablet 10 milliGRAM(s) Oral daily  atorvastatin 40 milliGRAM(s) Oral at bedtime  cinacalcet 30 milliGRAM(s) Oral daily  dexamethasone  Injectable 4 milliGRAM(s) IV Push every 8 hours  dextrose 5%. 1000 milliLiter(s) (50 mL/Hr) IV Continuous <Continuous>  dextrose 50% Injectable 12.5 Gram(s) IV Push once  dextrose 50% Injectable 25 Gram(s) IV Push once  dextrose 50% Injectable 25 Gram(s) IV Push once  insulin glargine Injectable (LANTUS) 22 Unit(s) SubCutaneous at bedtime  insulin lispro (HumaLOG) corrective regimen sliding scale   SubCutaneous Before meals and at bedtime  levothyroxine 75 MICROGram(s) Oral daily  pantoprazole    Tablet 40 milliGRAM(s) Oral before breakfast  phenazopyridine 100 milliGRAM(s) Oral three times a day after meals  piperacillin/tazobactam IVPB. 2.25 Gram(s) IV Intermittent every 8 hours  propranolol 20 milliGRAM(s) Oral <User Schedule>  QUEtiapine 25 milliGRAM(s) Oral two times a day  sucralfate 1 Gram(s) Oral Before meals and at bedtime  tamsulosin 0.4 milliGRAM(s) Oral at bedtime    MEDICATIONS  (PRN):  dextrose Gel 1 Dose(s) Oral once PRN Blood Glucose LESS THAN 70 milliGRAM(s)/deciliter  glucagon  Injectable 1 milliGRAM(s) IntraMuscular once PRN Glucose LESS THAN 70 milligrams/deciliter  hydrALAZINE Injectable 10 milliGRAM(s) IV Push every 8 hours PRN if sbp >170 or dbp >100  LORazepam   Injectable 1 milliGRAM(s) IV Push once PRN Agitation      RADIOLOGY & ADDITIONAL TESTS:

## 2017-12-23 NOTE — PROGRESS NOTE ADULT - SUBJECTIVE AND OBJECTIVE BOX
INTERVAL HISTORY:  stable, sitting in chair , eating, wants to go home      VITAL SIGNS:  Vital Signs Last 24 Hrs  T(C): 36.5 (23 Dec 2017 08:55), Max: 36.7 (22 Dec 2017 18:34)  T(F): 97.7 (23 Dec 2017 08:55), Max: 98 (22 Dec 2017 18:34)  HR: 67 (23 Dec 2017 11:30) (60 - 97)  BP: 137/79 (23 Dec 2017 11:30) (137/79 - 176/87)  BP(mean): 96 (23 Dec 2017 11:30) (92 - 115)  RR: 18 (23 Dec 2017 11:30) (13 - 19)  SpO2: 97% (23 Dec 2017 11:30) (96% - 99%)    PHYSICAL EXAMINATION:    Mentation:  awake and cooperative  Language/Speech: nl  CN: nl  Visual Fields: full  Motor: left wrist drop-stable,   mildly tremulous/shaky  Sensory:  DTR:  Babinski:      MEDS:  MEDICATIONS  (STANDING):  amLODIPine   Tablet 10 milliGRAM(s) Oral daily  atorvastatin 40 milliGRAM(s) Oral at bedtime  cinacalcet 30 milliGRAM(s) Oral daily  dexamethasone     Tablet 2 milliGRAM(s) Oral three times a day  dextrose 5%. 1000 milliLiter(s) (50 mL/Hr) IV Continuous <Continuous>  dextrose 50% Injectable 12.5 Gram(s) IV Push once  dextrose 50% Injectable 25 Gram(s) IV Push once  dextrose 50% Injectable 25 Gram(s) IV Push once  insulin glargine Injectable (LANTUS) 22 Unit(s) SubCutaneous at bedtime  insulin lispro (HumaLOG) corrective regimen sliding scale   SubCutaneous Before meals and at bedtime  levothyroxine 75 MICROGram(s) Oral daily  pantoprazole    Tablet 40 milliGRAM(s) Oral before breakfast  propranolol 20 milliGRAM(s) Oral <User Schedule>  QUEtiapine 25 milliGRAM(s) Oral two times a day  sucralfate 1 Gram(s) Oral Before meals and at bedtime  tamsulosin 0.4 milliGRAM(s) Oral at bedtime    MEDICATIONS  (PRN):  dextrose Gel 1 Dose(s) Oral once PRN Blood Glucose LESS THAN 70 milliGRAM(s)/deciliter  glucagon  Injectable 1 milliGRAM(s) IntraMuscular once PRN Glucose LESS THAN 70 milligrams/deciliter  hydrALAZINE Injectable 10 milliGRAM(s) IV Push every 8 hours PRN if sbp >170 or dbp >100  LORazepam   Injectable 1 milliGRAM(s) IV Push once PRN Agitation      LABS:                          10.9   13.2  )-----------( 280      ( 23 Dec 2017 10:42 )             34.2     12-23    144  |  109<H>  |  44.0<H>  ----------------------------<  489<H>  4.2   |  19.0<L>  |  2.06<H>    Ca    10.5<H>      23 Dec 2017 10:42    TPro  6.6  /  Alb  3.7  /  TBili  0.4  /  DBili  x   /  AST  7   /  ALT  10  /  AlkPhos  86  12-23    LIVER FUNCTIONS - ( 23 Dec 2017 10:42 )  Alb: 3.7 g/dL / Pro: 6.6 g/dL / ALK PHOS: 86 U/L / ALT: 10 U/L / AST: 7 U/L / GGT: x               RADIOLOGY & ADDITIONAL STUDIES:      IMPRESSION & PLAN:    Left radial nerve palsy  Old cervical hemorrhage- cannot excvlude new process as cause of left srist drop but no other signs of myelopathy  C MRI woth carlos deferred due to dec renal functiomn    Rec;   Decadron decreased to 2mg TID. Continue rapid taper to discontinuation over next few days  PT/OT  Will not actively follow.   Neurologically cleared for discharge/disposition.  Please recontact as needed.

## 2017-12-23 NOTE — PROGRESS NOTE ADULT - PROBLEM SELECTOR PLAN 2
MRI reviewed  Appreciate neurosurgery consult - will need MRI with contrast. However, given CKD,  pending renal consult for further assistance.  C/w dexamethasone 4mg IVP q8 hrs

## 2017-12-23 NOTE — CONSULT NOTE ADULT - SUBJECTIVE AND OBJECTIVE BOX
Patient is a 67y old  Male who presents with a chief complaint of left wrist and facial droop (20 Dec 2017 22:51) and feeling unwell  called for elevated creatinine    HPI:  History obtained from chart as patient is currently obtunded status post Ativan for MRI studies. He is a 67 year old male who was sent from [facility] for new onset left wrist drop and left facial droop. Last seen normal at 9 morning.   · Negative Findings: no blurred vision  · Timing: sudden onset  · Duration: hour(s)  4  · Severity: MILD  · Context: unknown  · Witnessed By: nursing home staff  · Aggravating Factors: none  · Relieving Factors: none (20 Dec 2017 22:51)      PAST MEDICAL & SURGICAL HISTORY:  HTN (hypertension)  Urinary retention  Renal impairment  Bipolar 1 disorder- took Lithium for 20 years, fully stopped in OCt 2017  S/P TURP    FAMILY HISTORY:  No pertinent family history in first degree relatives      Social History: smoke short period  Alcohol  -    Drugs  -      REVIEW OF SYSTEMS:  CONSTITUTIONAL: No fever, weight loss, or fatigue  EYES: No eye pain or visual disturbances,   RESPIRATORY: No cough, hemoptysis; or SOB  CARDIOVASCULAR: No chest pain, palpitations,  leg swelling  GASTROINTESTINAL: No abdominal , NVD or GIB  GENITOURINARY: No UTI sx  NEUROLOGICAL: No headaches/wk/numbness; speech ok; weakness better  MUSCULOSKELETAL: No joint pain or swelling; No muscle, back, or extremity pain      Vital Signs Last 24 Hrs  T(C): 36.6 (23 Dec 2017 16:00), Max: 36.7 (22 Dec 2017 18:34)  T(F): 97.8 (23 Dec 2017 16:00), Max: 98 (22 Dec 2017 18:34)  HR: 67 (23 Dec 2017 16:04) (60 - 78)  BP: 150/80 (23 Dec 2017 16:04) (137/79 - 176/87)  BP(mean): 99 (23 Dec 2017 16:04) (92 - 115)  RR: 11 (23 Dec 2017 16:04) (11 - 19)  SpO2: 97% (23 Dec 2017 16:04) (96% - 99%)    PHYSICAL EXAM:    GENERAL: NAD,   EYES:  conjunctiva and sclera clear  NECK: Supple, No JVD/Bruit, Normal thyroid  NERVOUS SYSTEM:  A/O x3, power 4-5/5; equal both sides; no droop  CHEST/LUNG:CTA No rales, rhonchi,  or rubs  HEART: Regular rate and rhythm; No murmurs, rubs, or gallops  ABDOMEN: Soft, NT/ND BS+  EXTREMITIES:  + Peripheral Pulses, No C/C/E  SKIN: No rashes or lesions      LABS:                        10.9   13.2  )-----------( 280      ( 23 Dec 2017 10:42 )             34.2     12-23    144  |  109<H>  |  44.0<H>  ----------------------------<  489<H>  4.2   |  19.0<L>  |  2.06<H>    Ca    10.5<H>      23 Dec 2017 10:42    TPro  6.6  /  Alb  3.7  /  TBili  0.4  /  DBili  x   /  AST  7   /  ALT  10  /  AlkPhos  86  12-23          MEDICATIONS  (STANDING):  amLODIPine   Tablet  atorvastatin  cinacalcet  dexamethasone     Tablet  dextrose 5%.  dextrose 50% Injectable  dextrose 50% Injectable  dextrose 50% Injectable  dextrose Gel PRN  glucagon  Injectable PRN  hydrALAZINE Injectable PRN  insulin glargine Injectable (LANTUS)  insulin lispro (HumaLOG) corrective regimen sliding scale  levothyroxine  LORazepam   Injectable PRN  pantoprazole    Tablet  propranolol  QUEtiapine  sucralfate  tamsulosin

## 2017-12-23 NOTE — CONSULT NOTE ADULT - ASSESSMENT
CKD - most likely d/t Lithium  creat stable with some fluctuation over last few months  need to check 24 H urine studies  Neuro sx better  Sono if not done recently  Shall follow   Thanks

## 2017-12-24 LAB
ALBUMIN SERPL ELPH-MCNC: 3.7 G/DL — SIGNIFICANT CHANGE UP (ref 3.3–5.2)
ALP SERPL-CCNC: 83 U/L — SIGNIFICANT CHANGE UP (ref 40–120)
ALT FLD-CCNC: 10 U/L — SIGNIFICANT CHANGE UP
ANION GAP SERPL CALC-SCNC: 18 MMOL/L — HIGH (ref 5–17)
APPEARANCE UR: CLEAR — SIGNIFICANT CHANGE UP
AST SERPL-CCNC: 7 U/L — SIGNIFICANT CHANGE UP
BACTERIA # UR AUTO: ABNORMAL
BILIRUB SERPL-MCNC: 0.4 MG/DL — SIGNIFICANT CHANGE UP (ref 0.4–2)
BILIRUB UR-MCNC: NEGATIVE — SIGNIFICANT CHANGE UP
BUN SERPL-MCNC: 44 MG/DL — HIGH (ref 8–20)
CALCIUM SERPL-MCNC: 11.2 MG/DL — HIGH (ref 8.6–10.2)
CHLORIDE SERPL-SCNC: 112 MMOL/L — HIGH (ref 98–107)
CO2 SERPL-SCNC: 20 MMOL/L — LOW (ref 22–29)
COLOR SPEC: YELLOW — SIGNIFICANT CHANGE UP
CREAT SERPL-MCNC: 1.86 MG/DL — HIGH (ref 0.5–1.3)
DIFF PNL FLD: NEGATIVE — SIGNIFICANT CHANGE UP
EPI CELLS # UR: SIGNIFICANT CHANGE UP
FERRITIN SERPL-MCNC: 407.2 NG/ML — HIGH (ref 30–400)
GLUCOSE BLDC GLUCOMTR-MCNC: 210 MG/DL — HIGH (ref 70–99)
GLUCOSE BLDC GLUCOMTR-MCNC: 233 MG/DL — HIGH (ref 70–99)
GLUCOSE BLDC GLUCOMTR-MCNC: 302 MG/DL — HIGH (ref 70–99)
GLUCOSE BLDC GLUCOMTR-MCNC: 325 MG/DL — HIGH (ref 70–99)
GLUCOSE SERPL-MCNC: 207 MG/DL — HIGH (ref 70–115)
GLUCOSE UR QL: 250 MG/DL
HCT VFR BLD CALC: 34.7 % — LOW (ref 42–52)
HGB BLD-MCNC: 11.3 G/DL — LOW (ref 14–18)
IRON SATN MFR SERPL: 34 % — SIGNIFICANT CHANGE UP (ref 16–55)
IRON SATN MFR SERPL: 84 UG/DL — SIGNIFICANT CHANGE UP (ref 59–158)
KETONES UR-MCNC: NEGATIVE — SIGNIFICANT CHANGE UP
LEUKOCYTE ESTERASE UR-ACNC: ABNORMAL
MCHC RBC-ENTMCNC: 29.5 PG — SIGNIFICANT CHANGE UP (ref 27–31)
MCHC RBC-ENTMCNC: 32.6 G/DL — SIGNIFICANT CHANGE UP (ref 32–36)
MCV RBC AUTO: 90.6 FL — SIGNIFICANT CHANGE UP (ref 80–94)
NITRITE UR-MCNC: POSITIVE
PH UR: 7 — SIGNIFICANT CHANGE UP (ref 5–8)
PLATELET # BLD AUTO: 284 K/UL — SIGNIFICANT CHANGE UP (ref 150–400)
POTASSIUM SERPL-MCNC: 3.8 MMOL/L — SIGNIFICANT CHANGE UP (ref 3.5–5.3)
POTASSIUM SERPL-SCNC: 3.8 MMOL/L — SIGNIFICANT CHANGE UP (ref 3.5–5.3)
PROT SERPL-MCNC: 6.6 G/DL — SIGNIFICANT CHANGE UP (ref 6.6–8.7)
PROT UR-MCNC: 30 MG/DL
RBC # BLD: 3.83 M/UL — LOW (ref 4.6–6.2)
RBC # FLD: 15.5 % — SIGNIFICANT CHANGE UP (ref 11–15.6)
RBC CASTS # UR COMP ASSIST: SIGNIFICANT CHANGE UP /HPF (ref 0–4)
SODIUM SERPL-SCNC: 150 MMOL/L — HIGH (ref 135–145)
SP GR SPEC: 1.01 — SIGNIFICANT CHANGE UP (ref 1.01–1.02)
TIBC SERPL-MCNC: 250 UG/DL — SIGNIFICANT CHANGE UP (ref 220–430)
TRANSFERRIN SERPL-MCNC: 175 MG/DL — LOW (ref 180–329)
UROBILINOGEN FLD QL: 1 MG/DL
WBC # BLD: 13.3 K/UL — HIGH (ref 4.8–10.8)
WBC # FLD AUTO: 13.3 K/UL — HIGH (ref 4.8–10.8)
WBC UR QL: SIGNIFICANT CHANGE UP

## 2017-12-24 PROCEDURE — 99231 SBSQ HOSP IP/OBS SF/LOW 25: CPT

## 2017-12-24 PROCEDURE — 99233 SBSQ HOSP IP/OBS HIGH 50: CPT

## 2017-12-24 RX ADMIN — PANTOPRAZOLE SODIUM 40 MILLIGRAM(S): 20 TABLET, DELAYED RELEASE ORAL at 05:49

## 2017-12-24 RX ADMIN — Medication 1 GRAM(S): at 22:48

## 2017-12-24 RX ADMIN — Medication 2 MILLIGRAM(S): at 22:48

## 2017-12-24 RX ADMIN — AMLODIPINE BESYLATE 10 MILLIGRAM(S): 2.5 TABLET ORAL at 05:49

## 2017-12-24 RX ADMIN — Medication 2: at 17:14

## 2017-12-24 RX ADMIN — QUETIAPINE FUMARATE 25 MILLIGRAM(S): 200 TABLET, FILM COATED ORAL at 16:54

## 2017-12-24 RX ADMIN — CINACALCET 30 MILLIGRAM(S): 30 TABLET, FILM COATED ORAL at 11:56

## 2017-12-24 RX ADMIN — Medication 20 MILLIGRAM(S): at 16:54

## 2017-12-24 RX ADMIN — Medication 4: at 11:56

## 2017-12-24 RX ADMIN — ATORVASTATIN CALCIUM 40 MILLIGRAM(S): 80 TABLET, FILM COATED ORAL at 22:48

## 2017-12-24 RX ADMIN — Medication 1 GRAM(S): at 16:53

## 2017-12-24 RX ADMIN — Medication 2 MILLIGRAM(S): at 05:49

## 2017-12-24 RX ADMIN — QUETIAPINE FUMARATE 25 MILLIGRAM(S): 200 TABLET, FILM COATED ORAL at 05:49

## 2017-12-24 RX ADMIN — Medication 2 MILLIGRAM(S): at 16:54

## 2017-12-24 RX ADMIN — Medication 4: at 22:47

## 2017-12-24 RX ADMIN — Medication 1 GRAM(S): at 11:56

## 2017-12-24 RX ADMIN — INSULIN GLARGINE 22 UNIT(S): 100 INJECTION, SOLUTION SUBCUTANEOUS at 22:48

## 2017-12-24 RX ADMIN — Medication 75 MICROGRAM(S): at 05:49

## 2017-12-24 RX ADMIN — Medication 1 GRAM(S): at 05:49

## 2017-12-24 RX ADMIN — Medication 2: at 05:49

## 2017-12-24 RX ADMIN — TAMSULOSIN HYDROCHLORIDE 0.4 MILLIGRAM(S): 0.4 CAPSULE ORAL at 22:48

## 2017-12-24 RX ADMIN — Medication 20 MILLIGRAM(S): at 09:48

## 2017-12-24 NOTE — PROGRESS NOTE ADULT - PROBLEM SELECTOR PLAN 2
MRI reviewed  Appreciate neurosurgery consult - will need MRI with contrast.    Renal consult requesting 24 hour urine study prior to MRI with contrast  C/w dexamethasone 2mg PO qd

## 2017-12-24 NOTE — PROGRESS NOTE ADULT - SUBJECTIVE AND OBJECTIVE BOX
S: 64 M PMH bipolar disorder on lithium with renal insufficiency admitted  from [facility] w/ new onset L wrist drop and L facial droop. MRI showed abnormal increased T2 signal on R side of cord C5-C6. Patient seen lying comfortably in bed eating breakfast.    O:   ICU Vital Signs Last 24 Hrs  T(C): 36.7 (24 Dec 2017 08:35), Max: 37.1 (24 Dec 2017 05:00)  T(F): 98 (24 Dec 2017 08:35), Max: 98.8 (24 Dec 2017 05:00)  HR: 63 (24 Dec 2017 08:43) (59 - 70)  BP: 145/72 (24 Dec 2017 08:43) (137/79 - 163/80)  BP(mean): 95 (24 Dec 2017 08:43) (95 - 99)  RR: 13 (24 Dec 2017 08:43) (11 - 18)  SpO2: 97% (24 Dec 2017 08:43) (95% - 98%)    LABS:                      10.9   13.2  )-----------( 280      ( 23 Dec 2017 10:42 )             34.2     12-    144  |  109<H>  |  44.0<H>  ----------------------------<  489<H>  4.2   |  19.0<L>  |  2.06<H>    Urinalysis Basic - ( 24 Dec 2017 01:35 )  Color: Yellow / Appearance: Clear / S.010 / pH: x  Gluc: x / Ketone: Negative  / Bili: Negative / Urobili: 1 mg/dL   Blood: x / Protein: 30 mg/dL / Nitrite: Positive   Leuk Esterase: Trace / RBC: 0-2 /HPF / WBC 0-2   Sq Epi: x / Non Sq Epi: Occasional / Bacteria: Occasional    EXAM: NAD, frail, generalized atrophy. Awake, alert, minimally verbal. States he is good. Follows commands. PERRL, EOMI, face symmetric, tongue midline. L wrist extension remains 4/5. RUE 5/5, b/l lowers 5/5.    RADIOLOGY:  - from: MR Cervical Spine No Cont (17 @ 21:46)  IMPRESSION:   Abnormal increased T2 signal consistent in the right side of the cord at   C5 and C6 level measuring 5 x 6 mm in axial dimension and 19 mm in   craniocaudal dimension. This lesion shows blooming on the gradient echo   MERGE images likely due to hemosiderin hemosiderin. Postcontrast images   are recommended.

## 2017-12-24 NOTE — PROGRESS NOTE ADULT - SUBJECTIVE AND OBJECTIVE BOX
MASOUD ELLIS    [id number]    67y      Male    INTERVAL HPI/OVERNIGHT EVENTS: No events on. Offers no complaints. Undergoing 24 hour urine test.     Hospital course:  68 yo M h/o DM, HTN, psychiatric disease on lithium, prostate s/p surgery, chronic indwelling gama for "nonfunctioning bladder" and urinary retention, parathyroid disease s/p resection and CKD presented for left wrist and facial droop. In the ED, pt was found to have hypertensive emergency requiring nicardipine infusion. MRI neck showed abnormal increased T2 signal consistent in the right side of the cord at C5 and C6 level measuring 5 x 6 mm in axial dimension and 19 mm in craniocaudal dimension. MRA neck negative for stenosis. MRI brain negative for ischemic injury.     REVIEW OF SYSTEMS:    CONSTITUTIONAL: No fever   RESPIRATORY: No cough      Vital Signs Last 24 Hrs  T(C): 36.7 (24 Dec 2017 08:35), Max: 37.1 (24 Dec 2017 05:00)  T(F): 98 (24 Dec 2017 08:35), Max: 98.8 (24 Dec 2017 05:00)  HR: 63 (24 Dec 2017 08:43) (59 - 70)  BP: 145/72 (24 Dec 2017 08:43) (145/72 - 163/80)  BP(mean): 95 (24 Dec 2017 08:43) (95 - 99)  RR: 13 (24 Dec 2017 08:43) (11 - 13)  SpO2: 97% (24 Dec 2017 08:43) (95% - 98%)    PHYSICAL EXAM:    GENERAL: NAD, frail, appears older than stated age, flat affect  HEENT: PERRL, +EOMI, MMM   CHEST/LUNG: Clear to percussion bilaterally   HEART: S1S2+, Regular rate and rhythm   ABDOMEN: Soft, Nontender, Nondistended; Bowel sounds present          LABS:                        11.3   13.3  )-----------( 284      ( 24 Dec 2017 10:18 )             34.7     12-24    150<H>  |  112<H>  |  44.0<H>  ----------------------------<  207<H>  3.8   |  20.0<L>  |  1.86<H>    Ca    11.2<H>      24 Dec 2017 10:18    TPro  6.6  /  Alb  3.7  /  TBili  0.4  /  DBili  x   /  AST  7   /  ALT  10  /  AlkPhos  83  12-24      Urinalysis Basic - ( 24 Dec 2017 01:35 )    Color: Yellow / Appearance: Clear / S.010 / pH: x  Gluc: x / Ketone: Negative  / Bili: Negative / Urobili: 1 mg/dL   Blood: x / Protein: 30 mg/dL / Nitrite: Positive   Leuk Esterase: Trace / RBC: 0-2 /HPF / WBC 0-2   Sq Epi: x / Non Sq Epi: Occasional / Bacteria: Occasional          MEDICATIONS  (STANDING):  amLODIPine   Tablet 10 milliGRAM(s) Oral daily  atorvastatin 40 milliGRAM(s) Oral at bedtime  cinacalcet 30 milliGRAM(s) Oral daily  dexamethasone     Tablet 2 milliGRAM(s) Oral three times a day  dextrose 5%. 1000 milliLiter(s) (50 mL/Hr) IV Continuous <Continuous>  dextrose 50% Injectable 12.5 Gram(s) IV Push once  dextrose 50% Injectable 25 Gram(s) IV Push once  dextrose 50% Injectable 25 Gram(s) IV Push once  insulin glargine Injectable (LANTUS) 22 Unit(s) SubCutaneous at bedtime  insulin lispro (HumaLOG) corrective regimen sliding scale   SubCutaneous Before meals and at bedtime  levothyroxine 75 MICROGram(s) Oral daily  pantoprazole    Tablet 40 milliGRAM(s) Oral before breakfast  propranolol 20 milliGRAM(s) Oral <User Schedule>  QUEtiapine 25 milliGRAM(s) Oral two times a day  sucralfate 1 Gram(s) Oral Before meals and at bedtime  tamsulosin 0.4 milliGRAM(s) Oral at bedtime    MEDICATIONS  (PRN):  dextrose Gel 1 Dose(s) Oral once PRN Blood Glucose LESS THAN 70 milliGRAM(s)/deciliter  glucagon  Injectable 1 milliGRAM(s) IntraMuscular once PRN Glucose LESS THAN 70 milligrams/deciliter  hydrALAZINE Injectable 10 milliGRAM(s) IV Push every 8 hours PRN if sbp >170 or dbp >100  LORazepam   Injectable 1 milliGRAM(s) IV Push once PRN Agitation      RADIOLOGY & ADDITIONAL TESTS:

## 2017-12-25 ENCOUNTER — TRANSCRIPTION ENCOUNTER (OUTPATIENT)
Age: 67
End: 2017-12-25

## 2017-12-25 LAB
ALBUMIN SERPL ELPH-MCNC: 3.3 G/DL — SIGNIFICANT CHANGE UP (ref 3.3–5.2)
ALP SERPL-CCNC: 80 U/L — SIGNIFICANT CHANGE UP (ref 40–120)
ALT FLD-CCNC: 10 U/L — SIGNIFICANT CHANGE UP
ANION GAP SERPL CALC-SCNC: 16 MMOL/L — SIGNIFICANT CHANGE UP (ref 5–17)
AST SERPL-CCNC: 11 U/L — SIGNIFICANT CHANGE UP
BILIRUB SERPL-MCNC: 0.5 MG/DL — SIGNIFICANT CHANGE UP (ref 0.4–2)
BODY SURFACE AREA CALCULATION: 1.73 M2 — SIGNIFICANT CHANGE UP
BUN SERPL-MCNC: 41 MG/DL — HIGH (ref 8–20)
CALCIUM SERPL-MCNC: 10.2 MG/DL — SIGNIFICANT CHANGE UP (ref 8.6–10.2)
CHLORIDE SERPL-SCNC: 109 MMOL/L — HIGH (ref 98–107)
CO2 SERPL-SCNC: 20 MMOL/L — LOW (ref 22–29)
COLLECT DURATION TIME UR: 24 HR — SIGNIFICANT CHANGE UP
COLLECT DURATION TIME UR: 24 HR — SIGNIFICANT CHANGE UP
CREAT CL ?TM UR+SERPL-VRATE: 37 ML/MIN — LOW (ref 85–125)
CREAT SERPL-MCNC: 1.72 MG/DL — HIGH (ref 0.5–1.3)
CREAT SERPL-MCNC: 2.06 MG/DL — HIGH (ref 0.5–1.3)
GLUCOSE BLDC GLUCOMTR-MCNC: 234 MG/DL — HIGH (ref 70–99)
GLUCOSE BLDC GLUCOMTR-MCNC: 277 MG/DL — HIGH (ref 70–99)
GLUCOSE BLDC GLUCOMTR-MCNC: 308 MG/DL — HIGH (ref 70–99)
GLUCOSE BLDC GLUCOMTR-MCNC: 335 MG/DL — HIGH (ref 70–99)
GLUCOSE SERPL-MCNC: 226 MG/DL — HIGH (ref 70–115)
HCT VFR BLD CALC: 33 % — LOW (ref 42–52)
HGB BLD-MCNC: 10.8 G/DL — LOW (ref 14–18)
MCHC RBC-ENTMCNC: 29.1 PG — SIGNIFICANT CHANGE UP (ref 27–31)
MCHC RBC-ENTMCNC: 32.7 G/DL — SIGNIFICANT CHANGE UP (ref 32–36)
MCV RBC AUTO: 88.9 FL — SIGNIFICANT CHANGE UP (ref 80–94)
OB PNL STL: NEGATIVE — SIGNIFICANT CHANGE UP
PLATELET # BLD AUTO: 257 K/UL — SIGNIFICANT CHANGE UP (ref 150–400)
POTASSIUM SERPL-MCNC: 3.6 MMOL/L — SIGNIFICANT CHANGE UP (ref 3.5–5.3)
POTASSIUM SERPL-SCNC: 3.6 MMOL/L — SIGNIFICANT CHANGE UP (ref 3.5–5.3)
PROT 24H UR-MRATE: 1382 MG/24HR — HIGH (ref 50–100)
PROT SERPL-MCNC: 5.9 G/DL — LOW (ref 6.6–8.7)
RBC # BLD: 3.71 M/UL — LOW (ref 4.6–6.2)
RBC # FLD: 15.6 % — SIGNIFICANT CHANGE UP (ref 11–15.6)
SODIUM SERPL-SCNC: 145 MMOL/L — SIGNIFICANT CHANGE UP (ref 135–145)
TOTAL VOLUME - 24 HOUR: 3950 ML — SIGNIFICANT CHANGE UP
TOTAL VOLUME - 24 HOUR: 3950 ML — SIGNIFICANT CHANGE UP
URINE CREATININE CALCULATION: 1.1 G/24 HR — SIGNIFICANT CHANGE UP (ref 1–2)
URINE CREATININE CALCULATION: 1.1 G/24 HR — SIGNIFICANT CHANGE UP (ref 1–2)
WBC # BLD: 9.2 K/UL — SIGNIFICANT CHANGE UP (ref 4.8–10.8)
WBC # FLD AUTO: 9.2 K/UL — SIGNIFICANT CHANGE UP (ref 4.8–10.8)

## 2017-12-25 PROCEDURE — 99233 SBSQ HOSP IP/OBS HIGH 50: CPT

## 2017-12-25 RX ORDER — SODIUM CHLORIDE 9 MG/ML
1000 INJECTION, SOLUTION INTRAVENOUS
Qty: 0 | Refills: 0 | Status: DISCONTINUED | OUTPATIENT
Start: 2017-12-25 | End: 2017-12-29

## 2017-12-25 RX ADMIN — Medication 4: at 11:37

## 2017-12-25 RX ADMIN — QUETIAPINE FUMARATE 25 MILLIGRAM(S): 200 TABLET, FILM COATED ORAL at 18:28

## 2017-12-25 RX ADMIN — ATORVASTATIN CALCIUM 40 MILLIGRAM(S): 80 TABLET, FILM COATED ORAL at 22:42

## 2017-12-25 RX ADMIN — Medication 10 MILLIGRAM(S): at 16:10

## 2017-12-25 RX ADMIN — Medication 2: at 05:17

## 2017-12-25 RX ADMIN — Medication 1 GRAM(S): at 22:42

## 2017-12-25 RX ADMIN — INSULIN GLARGINE 22 UNIT(S): 100 INJECTION, SOLUTION SUBCUTANEOUS at 22:42

## 2017-12-25 RX ADMIN — Medication 2 MILLIGRAM(S): at 22:42

## 2017-12-25 RX ADMIN — PANTOPRAZOLE SODIUM 40 MILLIGRAM(S): 20 TABLET, DELAYED RELEASE ORAL at 05:18

## 2017-12-25 RX ADMIN — QUETIAPINE FUMARATE 25 MILLIGRAM(S): 200 TABLET, FILM COATED ORAL at 05:18

## 2017-12-25 RX ADMIN — Medication 2 MILLIGRAM(S): at 05:18

## 2017-12-25 RX ADMIN — Medication 4: at 16:03

## 2017-12-25 RX ADMIN — TAMSULOSIN HYDROCHLORIDE 0.4 MILLIGRAM(S): 0.4 CAPSULE ORAL at 22:42

## 2017-12-25 RX ADMIN — Medication 20 MILLIGRAM(S): at 09:50

## 2017-12-25 RX ADMIN — Medication 1 MILLIGRAM(S): at 20:48

## 2017-12-25 RX ADMIN — Medication 1 GRAM(S): at 16:04

## 2017-12-25 RX ADMIN — Medication 1 GRAM(S): at 11:38

## 2017-12-25 RX ADMIN — Medication 1 GRAM(S): at 05:18

## 2017-12-25 RX ADMIN — Medication 20 MILLIGRAM(S): at 16:35

## 2017-12-25 RX ADMIN — Medication 75 MICROGRAM(S): at 05:18

## 2017-12-25 RX ADMIN — Medication 2 MILLIGRAM(S): at 15:14

## 2017-12-25 RX ADMIN — AMLODIPINE BESYLATE 10 MILLIGRAM(S): 2.5 TABLET ORAL at 05:18

## 2017-12-25 NOTE — PROGRESS NOTE ADULT - SUBJECTIVE AND OBJECTIVE BOX
NOTE IS INCOMPLETE    S: 64 M PMH bipolar disorder on lithium with renal insufficiency admitted 12/20 from [facility] w/ new onset L wrist drop and L facial droop. MRI showed abnormal increased T2 signal on R side of cord C5-C6.    12/25/17:  Patient seen lying comfortably in bed. No events overnight. No new issues.     O:   Vital Signs Last 24 Hrs  T(C): 36.2 (25 Dec 2017 08:11), Max: 37.1 (24 Dec 2017 20:02)  T(F): 97.2 (25 Dec 2017 08:11), Max: 98.8 (24 Dec 2017 20:02)  HR: 102 (25 Dec 2017 08:00) (59 - 102)  BP: 151/49 (25 Dec 2017 08:00) (134/94 - 169/90)  BP(mean): 106 (25 Dec 2017 00:00) (104 - 115)  RR: 16 (25 Dec 2017 08:00) (13 - 22)  SpO2: 100% (25 Dec 2017 08:00) (97% - 100%)       EXAM: NAD, frail, generalized atrophy. Awake, alert, minimally verbal. States he is good. Follows commands. PERRL, EOMI, face symmetric, tongue midline. L wrist extension remains 4/5. RUE 5/5, b/l lowers 5/5.    LABS:               10.8   9.2   )-----------( 257               33.0     145  |  109<H>  |  41.0<H>  ----------------------------<  226<H>  3.6   |  20.0<L>  |  1.72<H>    Ca    10.2        TPro  5.9<L>  /  Alb  3.3  /  TBili  0.5  /  DBili  x   /  AST  11  /  ALT  10  /  AlkPhos  80  12-25    RADIOLOGY:  MR Cervical Spine No Cont (12.20.17 @ 21:46)  IMPRESSION:   Abnormal increased T2 signal consistent in the right side of the cord at   C5 and C6 level measuring 5 x 6 mm in axial dimension and 19 mm in   craniocaudal dimension. This lesion shows blooming on the gradient echo   MERGE images likely due to hemosiderin hemosiderin. Postcontrast images   are recommended.

## 2017-12-25 NOTE — PROGRESS NOTE ADULT - SUBJECTIVE AND OBJECTIVE BOX
No new events overnight.      Vital Signs Last 24 Hrs    T(C): 36.5 (25 Dec 2017 04:47), Max: 37.1 (24 Dec 2017 20:02)  T(F): 97.7 (25 Dec 2017 04:47), Max: 98.8 (24 Dec 2017 20:02)  HR: 59 (25 Dec 2017 04:00) (59 - 92)  BP: 153/84 (25 Dec 2017 04:00) (134/94 - 169/90)  BP(mean): 106 (25 Dec 2017 00:00) (95 - 115)  RR: 13 (25 Dec 2017 04:00) (13 - 22)  SpO2: 97% (25 Dec 2017 04:00) (97% - 98%)  T(C): 36.2 (24 Dec 2017 13:10), Max: 37.1 (24 Dec 2017 05:00)  T(F): 97.2 (24 Dec 2017 13:10), Max: 98.8 (24 Dec 2017 05:00)  HR: 92 (24 Dec 2017 16:03) (59 - 92)  BP: 165/96 (24 Dec 2017 16:03) (145/72 - 169/90)  BP(mean): 112 (24 Dec 2017 16:03) (95 - 115)  RR: 18 (24 Dec 2017 16:03) (12 - 18)  SpO2: 98% (24 Dec 2017 16:03) (95% - 98%)    PHYSICAL EXAM    GENERAL: NAD,   EYES:  conjunctiva and sclera clear  NECK: Supple, No JVD/Bruit  NERVOUS SYSTEM:  awake in bed  CHEST:  no wheezes, no 02  HEART:  neck veins  flat, no rub  ABDOMEN: Soft, not tender  EXTREMITIES:  No Edema; diffuse  muscle wasting  SKIN: No rashes    gama with yellow  urine, I&O  noted      24 Dec 2017 10:18    150    |  112    |  44.0   ----------------------------<  207    3.8     |  20.0   |  1.86     Ca    11.2       24 Dec 2017 10:18    TPro  6.6    /  Alb  3.7    /  TBili  0.4    /  DBili  x      /  AST  7      /  ALT  10     /  AlkPhos  83     24 Dec 2017 10:18                          11.3   13.3  )-----------( 284      ( 24 Dec 2017 10:18 )             34.7

## 2017-12-25 NOTE — PROGRESS NOTE ADULT - PROBLEM SELECTOR PLAN 2
MRI reviewed  Appreciate neurosurgery consult - will need MRI with contrast.    D/w Dr. Santo (oncall renal) - measured creatinine clearance 37, ok to perform MRI with contrast.   C/w dexamethasone 2mg PO tid

## 2017-12-25 NOTE — PROGRESS NOTE ADULT - SUBJECTIVE AND OBJECTIVE BOX
MASOUD ELLIS    [id number]    67y      Male    INTERVAL HPI/OVERNIGHT EVENTS: No events on. Feels "fine" today.    Hospital course:  66 yo M h/o DM, HTN, psychiatric disease on lithium, prostate s/p surgery, chronic indwelling gama for "nonfunctioning bladder" and urinary retention, parathyroid disease s/p resection and CKD presented for left wrist and facial droop. In the ED, pt was found to have hypertensive emergency requiring nicardipine infusion. MRI neck showed abnormal increased T2 signal consistent in the right side of the cord at C5 and C6 level measuring 5 x 6 mm in axial dimension and 19 mm in craniocaudal dimension. MRA neck negative for stenosis. MRI brain negative for ischemic injury. 24hr urine protein study showed creatinine clearance 37. Per renal, given that creatinine clearance is above 30, ok to to perform MRI with contrast.       REVIEW OF SYSTEMS:    CONSTITUTIONAL: No fever   RESPIRATORY: No cough   CARDIOVASCULAR: No chest pain       Vital Signs Last 24 Hrs  T(C): 36.2 (25 Dec 2017 08:11), Max: 37.1 (24 Dec 2017 20:02)  T(F): 97.2 (25 Dec 2017 08:11), Max: 98.8 (24 Dec 2017 20:02)  HR: 102 (25 Dec 2017 08:00) (59 - 102)  BP: 151/49 (25 Dec 2017 08:00) (134/94 - 169/90)  BP(mean): 106 (25 Dec 2017 00:00) (104 - 115)  RR: 16 (25 Dec 2017 08:00) (13 - 22)  SpO2: 100% (25 Dec 2017 08:00) (97% - 100%) RA    PHYSICAL EXAM:    GENERAL: NAD, frail  HEENT: PERRL, +EOMI, MMM  CHEST/LUNG: Clear to percussion bilaterally  HEART: S1S2+, Regular rate and rhythm   ABDOMEN: Soft, Nontender, Nondistended; Bowel sounds present         LABS:                        10.8   9.2   )-----------( 257      ( 25 Dec 2017 07:59 )             33.0     12-    145  |  109<H>  |  41.0<H>  ----------------------------<  226<H>  3.6   |  20.0<L>  |  1.72<H>    Ca    10.2      25 Dec 2017 07:59    TPro  5.9<L>  /  Alb  3.3  /  TBili  0.5  /  DBili  x   /  AST  11  /  ALT  10  /  AlkPhos  80  12-25      Urinalysis Basic - ( 24 Dec 2017 01:35 )    Color: Yellow / Appearance: Clear / S.010 / pH: x  Gluc: x / Ketone: Negative  / Bili: Negative / Urobili: 1 mg/dL   Blood: x / Protein: 30 mg/dL / Nitrite: Positive   Leuk Esterase: Trace / RBC: 0-2 /HPF / WBC 0-2   Sq Epi: x / Non Sq Epi: Occasional / Bacteria: Occasional          MEDICATIONS  (STANDING):  amLODIPine   Tablet 10 milliGRAM(s) Oral daily  atorvastatin 40 milliGRAM(s) Oral at bedtime  dexamethasone     Tablet 2 milliGRAM(s) Oral three times a day  dextrose 5%. 1000 milliLiter(s) (50 mL/Hr) IV Continuous <Continuous>  dextrose 50% Injectable 12.5 Gram(s) IV Push once  dextrose 50% Injectable 25 Gram(s) IV Push once  dextrose 50% Injectable 25 Gram(s) IV Push once  insulin glargine Injectable (LANTUS) 22 Unit(s) SubCutaneous at bedtime  insulin lispro (HumaLOG) corrective regimen sliding scale   SubCutaneous Before meals and at bedtime  levothyroxine 75 MICROGram(s) Oral daily  pantoprazole    Tablet 40 milliGRAM(s) Oral before breakfast  propranolol 20 milliGRAM(s) Oral <User Schedule>  QUEtiapine 25 milliGRAM(s) Oral two times a day  sodium chloride 0.45% 1000 milliLiter(s) (125 mL/Hr) IV Continuous <Continuous>  sucralfate 1 Gram(s) Oral Before meals and at bedtime  tamsulosin 0.4 milliGRAM(s) Oral at bedtime    MEDICATIONS  (PRN):  dextrose Gel 1 Dose(s) Oral once PRN Blood Glucose LESS THAN 70 milliGRAM(s)/deciliter  glucagon  Injectable 1 milliGRAM(s) IntraMuscular once PRN Glucose LESS THAN 70 milligrams/deciliter  hydrALAZINE Injectable 10 milliGRAM(s) IV Push every 8 hours PRN if sbp >170 or dbp >100  LORazepam   Injectable 1 milliGRAM(s) IV Push once PRN Agitation      RADIOLOGY & ADDITIONAL TESTS:

## 2017-12-26 LAB
ALBUMIN SERPL ELPH-MCNC: 3.3 G/DL — SIGNIFICANT CHANGE UP (ref 3.3–5.2)
ALP SERPL-CCNC: 85 U/L — SIGNIFICANT CHANGE UP (ref 40–120)
ALT FLD-CCNC: 10 U/L — SIGNIFICANT CHANGE UP
ANION GAP SERPL CALC-SCNC: 17 MMOL/L — SIGNIFICANT CHANGE UP (ref 5–17)
AST SERPL-CCNC: 11 U/L — SIGNIFICANT CHANGE UP
BILIRUB SERPL-MCNC: 0.6 MG/DL — SIGNIFICANT CHANGE UP (ref 0.4–2)
BUN SERPL-MCNC: 41 MG/DL — HIGH (ref 8–20)
CALCIUM SERPL-MCNC: 10 MG/DL — SIGNIFICANT CHANGE UP (ref 8.6–10.2)
CALCIUM SERPL-MCNC: 10.6 MG/DL — HIGH (ref 8.4–10.5)
CHLORIDE SERPL-SCNC: 106 MMOL/L — SIGNIFICANT CHANGE UP (ref 98–107)
CO2 SERPL-SCNC: 17 MMOL/L — LOW (ref 22–29)
CREAT SERPL-MCNC: 1.54 MG/DL — HIGH (ref 0.5–1.3)
GLUCOSE BLDC GLUCOMTR-MCNC: 181 MG/DL — HIGH (ref 70–99)
GLUCOSE BLDC GLUCOMTR-MCNC: 246 MG/DL — HIGH (ref 70–99)
GLUCOSE BLDC GLUCOMTR-MCNC: 279 MG/DL — HIGH (ref 70–99)
GLUCOSE BLDC GLUCOMTR-MCNC: 313 MG/DL — HIGH (ref 70–99)
GLUCOSE SERPL-MCNC: 267 MG/DL — HIGH (ref 70–115)
HCT VFR BLD CALC: 31.5 % — LOW (ref 42–52)
HGB BLD-MCNC: 10.5 G/DL — LOW (ref 14–18)
MCHC RBC-ENTMCNC: 29.2 PG — SIGNIFICANT CHANGE UP (ref 27–31)
MCHC RBC-ENTMCNC: 33.3 G/DL — SIGNIFICANT CHANGE UP (ref 32–36)
MCV RBC AUTO: 87.5 FL — SIGNIFICANT CHANGE UP (ref 80–94)
PHOSPHATE SERPL-MCNC: 3.2 MG/DL — SIGNIFICANT CHANGE UP (ref 2.4–4.7)
PLATELET # BLD AUTO: 249 K/UL — SIGNIFICANT CHANGE UP (ref 150–400)
POTASSIUM SERPL-MCNC: 3.7 MMOL/L — SIGNIFICANT CHANGE UP (ref 3.5–5.3)
POTASSIUM SERPL-SCNC: 3.7 MMOL/L — SIGNIFICANT CHANGE UP (ref 3.5–5.3)
PROT SERPL-MCNC: 5.6 G/DL — LOW (ref 6.6–8.7)
PTH-INTACT FLD-MCNC: 160 PG/ML — HIGH (ref 15–65)
RBC # BLD: 3.6 M/UL — LOW (ref 4.6–6.2)
RBC # FLD: 15.3 % — SIGNIFICANT CHANGE UP (ref 11–15.6)
SODIUM SERPL-SCNC: 140 MMOL/L — SIGNIFICANT CHANGE UP (ref 135–145)
WBC # BLD: 8.1 K/UL — SIGNIFICANT CHANGE UP (ref 4.8–10.8)
WBC # FLD AUTO: 8.1 K/UL — SIGNIFICANT CHANGE UP (ref 4.8–10.8)

## 2017-12-26 PROCEDURE — 99233 SBSQ HOSP IP/OBS HIGH 50: CPT

## 2017-12-26 RX ORDER — INSULIN GLARGINE 100 [IU]/ML
25 INJECTION, SOLUTION SUBCUTANEOUS AT BEDTIME
Qty: 0 | Refills: 0 | Status: DISCONTINUED | OUTPATIENT
Start: 2017-12-26 | End: 2018-01-02

## 2017-12-26 RX ORDER — HALOPERIDOL DECANOATE 100 MG/ML
1 INJECTION INTRAMUSCULAR ONCE
Qty: 0 | Refills: 0 | Status: COMPLETED | OUTPATIENT
Start: 2017-12-26 | End: 2017-12-26

## 2017-12-26 RX ORDER — INSULIN LISPRO 100/ML
2 VIAL (ML) SUBCUTANEOUS
Qty: 0 | Refills: 0 | Status: DISCONTINUED | OUTPATIENT
Start: 2017-12-26 | End: 2018-01-02

## 2017-12-26 RX ADMIN — Medication 2 MILLIGRAM(S): at 22:15

## 2017-12-26 RX ADMIN — Medication 2 MILLIGRAM(S): at 06:57

## 2017-12-26 RX ADMIN — AMLODIPINE BESYLATE 10 MILLIGRAM(S): 2.5 TABLET ORAL at 06:56

## 2017-12-26 RX ADMIN — PANTOPRAZOLE SODIUM 40 MILLIGRAM(S): 20 TABLET, DELAYED RELEASE ORAL at 06:56

## 2017-12-26 RX ADMIN — Medication 2 UNIT(S): at 12:55

## 2017-12-26 RX ADMIN — QUETIAPINE FUMARATE 25 MILLIGRAM(S): 200 TABLET, FILM COATED ORAL at 06:57

## 2017-12-26 RX ADMIN — QUETIAPINE FUMARATE 25 MILLIGRAM(S): 200 TABLET, FILM COATED ORAL at 16:18

## 2017-12-26 RX ADMIN — Medication 10 MILLIGRAM(S): at 21:36

## 2017-12-26 RX ADMIN — Medication 1 GRAM(S): at 22:15

## 2017-12-26 RX ADMIN — Medication 75 MICROGRAM(S): at 06:56

## 2017-12-26 RX ADMIN — Medication 4: at 12:55

## 2017-12-26 RX ADMIN — Medication 2 UNIT(S): at 16:18

## 2017-12-26 RX ADMIN — Medication 10 MILLIGRAM(S): at 04:31

## 2017-12-26 RX ADMIN — Medication 2: at 16:18

## 2017-12-26 RX ADMIN — Medication 1 GRAM(S): at 12:54

## 2017-12-26 RX ADMIN — Medication 3: at 06:57

## 2017-12-26 RX ADMIN — Medication 2 MILLIGRAM(S): at 16:18

## 2017-12-26 RX ADMIN — SODIUM CHLORIDE 125 MILLILITER(S): 9 INJECTION, SOLUTION INTRAVENOUS at 00:23

## 2017-12-26 RX ADMIN — SODIUM CHLORIDE 50 MILLILITER(S): 9 INJECTION, SOLUTION INTRAVENOUS at 16:19

## 2017-12-26 RX ADMIN — Medication 20 MILLIGRAM(S): at 10:17

## 2017-12-26 RX ADMIN — INSULIN GLARGINE 25 UNIT(S): 100 INJECTION, SOLUTION SUBCUTANEOUS at 22:18

## 2017-12-26 RX ADMIN — HALOPERIDOL DECANOATE 1 MILLIGRAM(S): 100 INJECTION INTRAMUSCULAR at 21:50

## 2017-12-26 RX ADMIN — ATORVASTATIN CALCIUM 40 MILLIGRAM(S): 80 TABLET, FILM COATED ORAL at 22:15

## 2017-12-26 RX ADMIN — Medication 1 GRAM(S): at 16:17

## 2017-12-26 RX ADMIN — Medication 20 MILLIGRAM(S): at 16:18

## 2017-12-26 RX ADMIN — Medication 1 GRAM(S): at 06:57

## 2017-12-26 NOTE — DISCHARGE NOTE ADULT - CARE PROVIDERS DIRECT ADDRESSES
[email or web address],DirectAddress_Unknown,DirectAddress_Unknown,DirectAddress_Unknown    Dr. Moon- urology  Dr. Moore- endocrinology

## 2017-12-26 NOTE — DISCHARGE NOTE ADULT - SECONDARY DIAGNOSIS.
Bipolar 1 disorder CKD (chronic kidney disease) stage 3, GFR 30-59 ml/min Diabetes mellitus of other type with microalbuminuria, with long-term current use of insulin Acquired hypothyroidism HTN (hypertension) Hypertensive encephalopathy

## 2017-12-26 NOTE — DISCHARGE NOTE ADULT - PATIENT PORTAL LINK FT
“You can access the Follow[employer] Patient Portal, offered by [facility], by registering with the following website: [email or web address]”

## 2017-12-26 NOTE — PROGRESS NOTE ADULT - PROBLEM SELECTOR PLAN 4
Has been on zosyn since Dec. 14.   Pt without signs or symptoms of systemic infection at this time.   D/c zosyn and monitor off abx

## 2017-12-26 NOTE — DISCHARGE NOTE ADULT - PLAN OF CARE
resolved Likely from discontinuation of lithium & HTN. Continue with meds as directed. Follow up with your primary doctor within 1 week of discharge Continue with meds as directed. Follow up with your primary doctor & psychiatry within 1 week of discharge. Continue with meds as directed. Follow up with your primary doctor & nephrology. Please repeat BMP at NH. Pt needs to follow up with urology as outpatient due to hx of repeated UTI while with a chronic Hardin, Continue with meds as directed. Follow up with your primary doctor Continue with meds as directed. Follow up with your primary doctor. Add hydralazine PO if needed if SBP >150. Need to follow up with urology as outpatient due to hx of repeated UTI while with a chronic Hardin

## 2017-12-26 NOTE — DISCHARGE NOTE ADULT - CARE PLAN
Principal Discharge DX:	Encephalopathy  Goal:	resolved  Instructions for follow-up, activity and diet:	Likely from discontinuation of lithium & HTN. Continue with meds as directed. Follow up with your primary doctor within 1 week of discharge  Secondary Diagnosis:	Bipolar 1 disorder  Instructions for follow-up, activity and diet:	Continue with meds as directed. Follow up with your primary doctor & psychiatry within 1 week of discharge.  Secondary Diagnosis:	CKD (chronic kidney disease) stage 3, GFR 30-59 ml/min  Instructions for follow-up, activity and diet:	Continue with meds as directed. Follow up with your primary doctor & nephrology. Please repeat BMP at NH. Pt needs to follow up with urology as outpatient due to hx of repeated UTI while with a chronic Hardin,  Secondary Diagnosis:	Diabetes mellitus of other type with microalbuminuria, with long-term current use of insulin  Instructions for follow-up, activity and diet:	Continue with meds as directed. Follow up with your primary doctor  Secondary Diagnosis:	Acquired hypothyroidism  Instructions for follow-up, activity and diet:	Continue with meds as directed. Follow up with your primary doctor  Secondary Diagnosis:	HTN (hypertension)  Instructions for follow-up, activity and diet:	Continue with meds as directed. Follow up with your primary doctor  Secondary Diagnosis:	Hypertensive encephalopathy  Instructions for follow-up, activity and diet:	Continue with meds as directed. Follow up with your primary doctor. Add hydralazine PO if needed if SBP >150. Need to follow up with urology as outpatient due to hx of repeated UTI while with a chronic Hardin

## 2017-12-26 NOTE — PROGRESS NOTE ADULT - PROBLEM SELECTOR PLAN 5
Likely with steroid induced hyperglycemia  Increase lantus and add humalog  C/w lantus and sliding scale

## 2017-12-26 NOTE — PROGRESS NOTE ADULT - SUBJECTIVE AND OBJECTIVE BOX
Patient seen and examined    Feels fine; sl confused but at baseline  no c/o CP SOB NV   No swelling feet    Vital Signs Last 24 Hrs  T(C): 36.5 (26 Dec 2017 05:52), Max: 36.8 (26 Dec 2017 00:00)  T(F): 97.7 (26 Dec 2017 05:52), Max: 98.2 (26 Dec 2017 00:00)  HR: 88 (26 Dec 2017 08:06) (74 - 93)  BP: 134/81 (26 Dec 2017 08:06) (134/81 - 177/101)  BP(mean): 95 (26 Dec 2017 08:06) (95 - 110)  RR: 21 (26 Dec 2017 08:06) (11 - 21)  SpO2: 97% (26 Dec 2017 08:06) (96% - 99%)    PHYSICAL EXAM    GENERAL: NAD,   EYES:  conjunctiva and sclera clear  NECK: Supple, No JVD/Bruit  NERVOUS SYSTEM:  A/O x3,   CHEST:  CTA ,No rales or rhonchi  HEART:  RRR, No murmurs  ABDOMEN: Soft, NT/ND BS+  EXTREMITIES:  No Edema;  SKIN: No rashes    26 Dec 2017 06:56    140    |  106    |  41.0   ----------------------------<  267    3.7     |  17.0   |  1.54     Ca    10.0       26 Dec 2017 06:56  Phos  3.2       26 Dec 2017 06:56    TPro  5.6    /  Alb  3.3    /  TBili  0.6    /  DBili  x      /  AST  11     /  ALT  10     /  AlkPhos  85     26 Dec 2017 06:56                          10.5   8.1   )-----------( 249      ( 26 Dec 2017 06:54 )             31.5       Ca lower  PtH sl high- likely Primary hyperparathyroidism  Creat better; Clearance 37  Can get MRI with Gadolineum if OK with Radiologist  Decrease NS to 50 cc/H

## 2017-12-26 NOTE — DISCHARGE NOTE ADULT - MEDICATION SUMMARY - MEDICATIONS TO TAKE
I will START or STAY ON the medications listed below when I get home from the hospital:    Flomax 0.4 mg oral capsule  -- 1 cap(s) by mouth once a day  -- Indication: For Bph    propranolol 20 mg oral tablet  -- 2 tab(s) by mouth 2 times a day  -- Indication: For tremor    divalproex sodium 250 mg oral delayed release tablet  -- 1 tab(s) by mouth every 12 hours  -- Indication: For Bipolar 1 disorder    Lantus 100 units/mL subcutaneous solution  -- 22 unit(s) subcutaneous once a day (at bedtime)  -- Indication: For Dm    insulin lispro 100 units/mL subcutaneous solution  -- 2 unit(s) subcutaneous 3 times a day (before meals)  -- Indication: For Dm    Lipitor 40 mg oral tablet  -- 1 tab(s) by mouth once a day  -- Indication: For Hld    SEROquel 25 mg oral tablet  -- 1 tab(s) by mouth 2 times a day  -- Indication: For delirium    SEROquel 100 mg oral tablet  -- 0.5 tab(s) by mouth once a day (at bedtime)  -- Indication: For Delirium due to another medical condition    Sensipar 30 mg oral tablet  -- 1 tab(s) by mouth 2 times a day  -- Indication: For CKD (chronic kidney disease) stage 3, GFR 30-59 ml/min    amLODIPine 10 mg oral tablet  -- 1 tab(s) by mouth once a day  -- Indication: For Htn    cefpodoxime 200 mg oral tablet  -- 1 tab(s) by mouth every 12 hours   -- Finish all this medication unless otherwise directed by prescriber.  Take with food or milk.    -- Indication: For Uti    Pyridium 100 mg oral tablet  -- 1 tab(s) by mouth 3 times a day (after meals)  -- Indication: For Dysuria    Carafate 1 g oral tablet  -- 1 tab(s) by mouth 4 times a day (before meals and at bedtime)  -- Indication: For Pud    Protonix 40 mg oral delayed release tablet  -- 1 tab(s) by mouth once a day  -- Indication: For gerd    levothyroxine 75 mcg (0.075 mg) oral tablet  -- 1 tab(s) by mouth once a day  -- Indication: For hypothyroid    hydrALAZINE 50 mg oral tablet  -- 1 tab(s) by mouth every 8 hours  -- Indication: For Htn

## 2017-12-26 NOTE — DISCHARGE NOTE ADULT - MEDICATION SUMMARY - MEDICATIONS TO CHANGE
I will SWITCH the dose or number of times a day I take the medications listed below when I get home from the hospital:    propranolol 20 mg oral tablet  -- 1 tab(s) by mouth 2 times a day    SEROquel 100 mg oral tablet  -- 1 tab(s) by mouth once a day (at bedtime)

## 2017-12-26 NOTE — DISCHARGE NOTE ADULT - CARE PROVIDER_API CALL
Shailesh Judd), Neurosurgery  [address]  [address]  [place] [address]  Phone: [phone]  Fax: [phone]    Alvin Zafar), Neurology  [address] [place] [address]  Phone: [phone]  Fax: [phone]    Reginaldo Scott (MD), Internal Medicine; Nephrology  [address] [place] [address]  Phone: [phone]  Fax: [phone]    Eddy Gonzales (ISHA), Psych  Inpatient  [address] [place] [address]  Phone: [phone]  Fax: [phone]

## 2017-12-26 NOTE — DISCHARGE NOTE ADULT - NS AS DC STROKE ED MATERIALS
Stroke Warning Signs and Symptoms/Prescribed Medications/Need for Followup After Discharge/Call 911 for Stroke/Risk Factors for Stroke/Stroke Education Booklet

## 2017-12-26 NOTE — PROGRESS NOTE ADULT - SUBJECTIVE AND OBJECTIVE BOX
MASOUD ELLIS    [id number]    67y      Male    INTERVAL HPI/OVERNIGHT EVENTS: No events on. "I feel fine."    Hospital course:  68 yo M h/o DM, HTN, psychiatric disease on lithium, prostate s/p surgery, chronic indwelling gama for "nonfunctioning bladder" and urinary retention, parathyroid disease s/p resection and CKD presented for left wrist and facial droop. In the ED, pt was found to have hypertensive emergency requiring nicardipine infusion. MRI neck showed abnormal increased T2 signal consistent in the right side of the cord at C5 and C6 level measuring 5 x 6 mm in axial dimension and 19 mm in craniocaudal dimension. MRA neck negative for stenosis. MRI brain negative for ischemic injury. 24hr urine protein study showed creatinine clearance 37. Per renal, given that creatinine clearance is above 30, ok to to perform MRI with contrast.       REVIEW OF SYSTEMS:    CONSTITUTIONAL: No fever   RESPIRATORY: No cough   CARDIOVASCULAR: No chest pain     Vital Signs Last 24 Hrs  T(C): 36.5 (26 Dec 2017 05:52), Max: 36.8 (26 Dec 2017 00:00)  T(F): 97.7 (26 Dec 2017 05:52), Max: 98.2 (26 Dec 2017 00:00)  HR: 88 (26 Dec 2017 08:06) (74 - 93)  BP: 134/81 (26 Dec 2017 08:06) (134/81 - 177/101)  BP(mean): 95 (26 Dec 2017 08:06) (95 - 110)  RR: 21 (26 Dec 2017 08:06) (11 - 21)  SpO2: 97% (26 Dec 2017 08:06) (96% - 99%)    PHYSICAL EXAM:    GENERAL: NAD, frail, elderly  HEENT: PERRL, +EOMI, poor dentition  CHEST/LUNG: Clear to percussion bilaterally   HEART: S1S2+, Regular rate and rhythm   ABDOMEN: Soft, Nontender, Nondistended; Bowel sounds present     LABS:                        10.5   8.1   )-----------( 249      ( 26 Dec 2017 06:54 )             31.5     12-26    140  |  106  |  41.0<H>  ----------------------------<  267<H>  3.7   |  17.0<L>  |  1.54<H>    Ca    10.0      26 Dec 2017 06:56  Phos  3.2     12-26    TPro  5.6<L>  /  Alb  3.3  /  TBili  0.6  /  DBili  x   /  AST  11  /  ALT  10  /  AlkPhos  85  12-26            MEDICATIONS  (STANDING):  amLODIPine   Tablet 10 milliGRAM(s) Oral daily  atorvastatin 40 milliGRAM(s) Oral at bedtime  dexamethasone     Tablet 2 milliGRAM(s) Oral three times a day  dextrose 5%. 1000 milliLiter(s) (50 mL/Hr) IV Continuous <Continuous>  dextrose 50% Injectable 12.5 Gram(s) IV Push once  dextrose 50% Injectable 25 Gram(s) IV Push once  dextrose 50% Injectable 25 Gram(s) IV Push once  insulin glargine Injectable (LANTUS) 25 Unit(s) SubCutaneous at bedtime  insulin lispro (HumaLOG) corrective regimen sliding scale   SubCutaneous Before meals and at bedtime  insulin lispro Injectable (HumaLOG) 2 Unit(s) SubCutaneous three times a day before meals  levothyroxine 75 MICROGram(s) Oral daily  pantoprazole    Tablet 40 milliGRAM(s) Oral before breakfast  propranolol 20 milliGRAM(s) Oral <User Schedule>  QUEtiapine 25 milliGRAM(s) Oral two times a day  sodium chloride 0.45% 1000 milliLiter(s) (125 mL/Hr) IV Continuous <Continuous>  sucralfate 1 Gram(s) Oral Before meals and at bedtime  tamsulosin 0.4 milliGRAM(s) Oral at bedtime    MEDICATIONS  (PRN):  dextrose Gel 1 Dose(s) Oral once PRN Blood Glucose LESS THAN 70 milliGRAM(s)/deciliter  glucagon  Injectable 1 milliGRAM(s) IntraMuscular once PRN Glucose LESS THAN 70 milligrams/deciliter  hydrALAZINE Injectable 10 milliGRAM(s) IV Push every 8 hours PRN if sbp >170 or dbp >100      RADIOLOGY & ADDITIONAL TESTS:

## 2017-12-26 NOTE — DIETITIAN INITIAL EVALUATION ADULT. - OTHER INFO
Pt admitted for HTN encephalopathy + facial droop. Per SLP-> Pt needs pure, thins diet. Tolerating well thus far, 100% PO.

## 2017-12-26 NOTE — DISCHARGE NOTE ADULT - ADDITIONAL INSTRUCTIONS
Hypercalcemia: PTH not suppressed r/o primary HPT. parathyroid US noted with no parathyroid abnormality, f/u with endocrinology as outpatient for further management

## 2017-12-26 NOTE — DISCHARGE NOTE ADULT - HOSPITAL COURSE
68 yo M h/o DM, HTN, psychiatric disease on lithium, prostate s/p surgery, chronic indwelling gama for "nonfunctioning bladder" and urinary retention, parathyroid disease s/p resection and CKD presented for left wrist and facial droop. In the ED, pt was found to have hypertensive emergency requiring nicardipine infusion. MRI neck showed abnormal increased T2 signal consistent in the right side of the cord at C5 and C6 level measuring 5 x 6 mm in axial dimension and 19 mm in craniocaudal dimension. MRA neck negative for stenosis. MRI brain negative for ischemic injury. MRI cervical spine - broad differential diagnoses and the lesion does not correspond to clinical symptoms per neurosx - not a surgical candidate at this time.       Problem/Plan - 1:  ·  Problem: Lesion of cervical nerve root.  Plan: T2 hyperintensity, Mild stable cord compression at C5-C6  MRI reviewed, as per neurosurgery  MRI findings seem most consistent with chronic stenosis.  Signal change may be secondary to chronic stenosis or may be subacute secondary to recent fall/trauma.  The cervical MRI findings do not account for the patient's AMS, and are unlikely to be associated with a sudden onset unilateral wrist drop.  Appreciate neurosurgery consult - not a surgical candidate  Disccussed with neurology, will taper off dexamethasone as neurosurgery not planning on any intervention as per neurology recommendations.      Problem/Plan - 2:  ·  Problem: Delirium due to another medical condition.  Plan: Hypoactive delirium, improved  has been intermittently confused at least since Dec 20, requiring haldol/ativan - however no documentation  UTI resolving, on abx  Ct head , MRI head - no acute abnormality.  Underlying bipolar disorder  c/w seroquel  Per daughter pt becomes very somnolent with haldol, refused to receive haldol even prn - understands that he needs placement.  Discussed with neurology - The tremors and lip movements (now resolved) could be 2/2 seroquel and anti-psych meds, will need outpt f/u for evaluation of parkinson's.      Problem/Plan - 3:  ·  Problem: Leukocytosis, unspecified type.  Plan: resolved  2/2 steroids & UTI   pt has a chronic Gama which was changed in the ER, afebrile, Ucx with sensitive proteus & pseudomonas, Blood cx NGTD, will c/w abx for 2 more days     Problem/Plan - 4:  ·  Problem: CKD (chronic kidney disease) stage 3, GFR 30-59 ml/min.  Plan: Creatinine 1.5, stable  No acidosis, will d/c Bicarb in lieu of worsening hypernatremia  Appreciate renal consult  Renal US with b/l renal cysts.      Problem/Plan - 5:  ·  Problem: Hypertensive encephalopathy.  Plan: Hypertensive    hydralazine ,amlodipine.      Problem/Plan - 6:  Problem: Urinary tract infection with hematuria, site unspecified. Plan:   pt has a chronic Gama which was changed in the ER, afebrile, Ucx with sensitive proteus & pseudomonas, Blood cx NGTD, will c/w abx for 2 more days  pt needs to follow up with  as outpatient due to hx of repeated UTI while with a chronic Gama, discussed with daughter.     Problem/Plan - 7:  ·  Problem: Diabetes mellitus of other type with microalbuminuria, with long-term current use of insulin.  Plan: Likely with steroid induced hyperglycemia  ct lantus and humalog  C/w lantus and sliding scale.      Problem/Plan - 8:  ·  Problem: Acquired hypothyroidism.  Plan: Continue with synthroid  TSH on Nov. 23 - 0.74.      Problem/Plan - 9:  ·  Problem: Bipolar 1 disorder.  Plan: Psych consult appreciated  C/w quetiapine & depakote  cogentin d/mary lou by psych  Cleared by psych for discharge.      Problem/Plan - 10:  Problem: Prophylactic measure. Plan; SCDs.    Hypercalcemia: PTH not suppressed r/o primary HPT  parathyroid US noted, f/u outpatient for further management  - added Sensipar 30mg 2 x day  - trend labs    Hypernatremia- pt drinking good amount of free water, urine looks light in color, No acidosis, will d/c Bicarb in lieu of worsening hypernatremia. BMP to be repeated in NH    PT recommends back to momentum,  discussed with daughter Naz  in details.     GENERAL: NAD  HEENT: PERRL, +EOMI  NECK: soft, Supple   CHEST/LUNG: Clear to percussion bilaterally; No wheezing  HEART: S1S2+, Regular rate and rhythm; No murmurs  EXTREMITIES:   No clubbing, cyanosis, or edema  NEURO: AAOX3, slow to answer & move, blank facial expression,  generalized motor weakness    VSS. Pt doing very well. OOB, eating & drinking well. Mental status improved. Medically stable for d/c.

## 2017-12-27 LAB
ALBUMIN SERPL ELPH-MCNC: 3.5 G/DL — SIGNIFICANT CHANGE UP (ref 3.3–5.2)
ALP SERPL-CCNC: 86 U/L — SIGNIFICANT CHANGE UP (ref 40–120)
ALT FLD-CCNC: 12 U/L — SIGNIFICANT CHANGE UP
ANION GAP SERPL CALC-SCNC: 17 MMOL/L — SIGNIFICANT CHANGE UP (ref 5–17)
AST SERPL-CCNC: 14 U/L — SIGNIFICANT CHANGE UP
BILIRUB SERPL-MCNC: 0.7 MG/DL — SIGNIFICANT CHANGE UP (ref 0.4–2)
BUN SERPL-MCNC: 40 MG/DL — HIGH (ref 8–20)
CALCIUM SERPL-MCNC: 10.5 MG/DL — HIGH (ref 8.6–10.2)
CHLORIDE SERPL-SCNC: 108 MMOL/L — HIGH (ref 98–107)
CO2 SERPL-SCNC: 16 MMOL/L — LOW (ref 22–29)
CREAT SERPL-MCNC: 1.45 MG/DL — HIGH (ref 0.5–1.3)
GLUCOSE BLDC GLUCOMTR-MCNC: 167 MG/DL — HIGH (ref 70–99)
GLUCOSE BLDC GLUCOMTR-MCNC: 176 MG/DL — HIGH (ref 70–99)
GLUCOSE BLDC GLUCOMTR-MCNC: 179 MG/DL — HIGH (ref 70–99)
GLUCOSE BLDC GLUCOMTR-MCNC: 278 MG/DL — HIGH (ref 70–99)
GLUCOSE SERPL-MCNC: 256 MG/DL — HIGH (ref 70–115)
HCT VFR BLD CALC: 36.8 % — LOW (ref 42–52)
HGB BLD-MCNC: 12.2 G/DL — LOW (ref 14–18)
MCHC RBC-ENTMCNC: 28.9 PG — SIGNIFICANT CHANGE UP (ref 27–31)
MCHC RBC-ENTMCNC: 33.2 G/DL — SIGNIFICANT CHANGE UP (ref 32–36)
MCV RBC AUTO: 87.2 FL — SIGNIFICANT CHANGE UP (ref 80–94)
PLATELET # BLD AUTO: 293 K/UL — SIGNIFICANT CHANGE UP (ref 150–400)
POTASSIUM SERPL-MCNC: 4 MMOL/L — SIGNIFICANT CHANGE UP (ref 3.5–5.3)
POTASSIUM SERPL-SCNC: 4 MMOL/L — SIGNIFICANT CHANGE UP (ref 3.5–5.3)
PROT SERPL-MCNC: 6.3 G/DL — LOW (ref 6.6–8.7)
RBC # BLD: 4.22 M/UL — LOW (ref 4.6–6.2)
RBC # FLD: 15.1 % — SIGNIFICANT CHANGE UP (ref 11–15.6)
SODIUM SERPL-SCNC: 141 MMOL/L — SIGNIFICANT CHANGE UP (ref 135–145)
WBC # BLD: 13.2 K/UL — HIGH (ref 4.8–10.8)
WBC # FLD AUTO: 13.2 K/UL — HIGH (ref 4.8–10.8)

## 2017-12-27 PROCEDURE — 99233 SBSQ HOSP IP/OBS HIGH 50: CPT

## 2017-12-27 PROCEDURE — 99232 SBSQ HOSP IP/OBS MODERATE 35: CPT

## 2017-12-27 RX ORDER — HYDRALAZINE HCL 50 MG
10 TABLET ORAL ONCE
Qty: 0 | Refills: 0 | Status: COMPLETED | OUTPATIENT
Start: 2017-12-27 | End: 2017-12-27

## 2017-12-27 RX ORDER — PROPRANOLOL HCL 160 MG
40 CAPSULE, EXTENDED RELEASE 24HR ORAL
Qty: 0 | Refills: 0 | Status: DISCONTINUED | OUTPATIENT
Start: 2017-12-27 | End: 2018-01-02

## 2017-12-27 RX ADMIN — INSULIN GLARGINE 25 UNIT(S): 100 INJECTION, SOLUTION SUBCUTANEOUS at 21:27

## 2017-12-27 RX ADMIN — Medication 40 MILLIGRAM(S): at 17:05

## 2017-12-27 RX ADMIN — Medication 2 MILLIGRAM(S): at 21:28

## 2017-12-27 RX ADMIN — Medication 1: at 21:27

## 2017-12-27 RX ADMIN — Medication 1 GRAM(S): at 11:34

## 2017-12-27 RX ADMIN — Medication 1: at 07:51

## 2017-12-27 RX ADMIN — Medication 3: at 16:09

## 2017-12-27 RX ADMIN — Medication 10 MILLIGRAM(S): at 02:56

## 2017-12-27 RX ADMIN — Medication 1 GRAM(S): at 21:28

## 2017-12-27 RX ADMIN — AMLODIPINE BESYLATE 10 MILLIGRAM(S): 2.5 TABLET ORAL at 05:44

## 2017-12-27 RX ADMIN — PANTOPRAZOLE SODIUM 40 MILLIGRAM(S): 20 TABLET, DELAYED RELEASE ORAL at 05:44

## 2017-12-27 RX ADMIN — Medication 1 GRAM(S): at 17:04

## 2017-12-27 RX ADMIN — Medication 1 GRAM(S): at 05:44

## 2017-12-27 RX ADMIN — ATORVASTATIN CALCIUM 40 MILLIGRAM(S): 80 TABLET, FILM COATED ORAL at 21:28

## 2017-12-27 RX ADMIN — QUETIAPINE FUMARATE 25 MILLIGRAM(S): 200 TABLET, FILM COATED ORAL at 17:04

## 2017-12-27 RX ADMIN — QUETIAPINE FUMARATE 25 MILLIGRAM(S): 200 TABLET, FILM COATED ORAL at 05:44

## 2017-12-27 RX ADMIN — Medication 2 MILLIGRAM(S): at 05:44

## 2017-12-27 RX ADMIN — Medication 2 MILLIGRAM(S): at 14:16

## 2017-12-27 RX ADMIN — Medication 2 UNIT(S): at 16:09

## 2017-12-27 RX ADMIN — Medication 75 MICROGRAM(S): at 05:44

## 2017-12-27 RX ADMIN — Medication 2 UNIT(S): at 07:51

## 2017-12-27 RX ADMIN — TAMSULOSIN HYDROCHLORIDE 0.4 MILLIGRAM(S): 0.4 CAPSULE ORAL at 21:28

## 2017-12-27 NOTE — PROGRESS NOTE ADULT - SUBJECTIVE AND OBJECTIVE BOX
NEPHROLOGY INTERVAL HPI/OVERNIGHT EVENTS:    Examined earlier  Events noted    MEDICATIONS  (STANDING):  amLODIPine   Tablet 10 milliGRAM(s) Oral daily  atorvastatin 40 milliGRAM(s) Oral at bedtime  dexamethasone     Tablet 2 milliGRAM(s) Oral three times a day  dextrose 5%. 1000 milliLiter(s) (50 mL/Hr) IV Continuous <Continuous>  dextrose 50% Injectable 12.5 Gram(s) IV Push once  dextrose 50% Injectable 25 Gram(s) IV Push once  dextrose 50% Injectable 25 Gram(s) IV Push once  insulin glargine Injectable (LANTUS) 25 Unit(s) SubCutaneous at bedtime  insulin lispro (HumaLOG) corrective regimen sliding scale   SubCutaneous Before meals and at bedtime  insulin lispro Injectable (HumaLOG) 2 Unit(s) SubCutaneous three times a day before meals  levothyroxine 75 MICROGram(s) Oral daily  LORazepam   Injectable 1 milliGRAM(s) IV Push once  pantoprazole    Tablet 40 milliGRAM(s) Oral before breakfast  propranolol 40 milliGRAM(s) Oral two times a day  QUEtiapine 25 milliGRAM(s) Oral two times a day  sodium chloride 0.45% 1000 milliLiter(s) (50 mL/Hr) IV Continuous <Continuous>  sucralfate 1 Gram(s) Oral Before meals and at bedtime  tamsulosin 0.4 milliGRAM(s) Oral at bedtime    MEDICATIONS  (PRN):  dextrose Gel 1 Dose(s) Oral once PRN Blood Glucose LESS THAN 70 milliGRAM(s)/deciliter  glucagon  Injectable 1 milliGRAM(s) IntraMuscular once PRN Glucose LESS THAN 70 milligrams/deciliter  hydrALAZINE Injectable 10 milliGRAM(s) IV Push every 8 hours PRN if sbp >170 or dbp >100      Allergies    No Known Allergies    Intolerances        Vital Signs Last 24 Hrs  T(C): 36.4 (27 Dec 2017 08:27), Max: 37.2 (27 Dec 2017 03:38)  T(F): 97.6 (27 Dec 2017 08:27), Max: 98.9 (27 Dec 2017 03:38)  HR: 98 (27 Dec 2017 08:27) (70 - 101)  BP: 165/84 (27 Dec 2017 08:27) (153/95 - 201/100)  BP(mean): 117 (27 Dec 2017 02:42) (117 - 117)  RR: 18 (27 Dec 2017 08:27) (18 - 22)  SpO2: 97% (27 Dec 2017 08:27) (96% - 97%)  Daily     Daily Weight in k.4 (27 Dec 2017 02:47)    PHYSICAL EXAM:  GENERAL: NAD,   EYES:  conjunctiva and sclera clear  NECK: Supple, No JVD/Bruit  NERVOUS SYSTEM:  A/O x3,   CHEST:  CTA ,No rales   HEART:  RRR, No murmurs  ABDOMEN: Soft, NT/ND BS+  EXTREMITIES:  No Edema;    LABS:                        12.2   13.2  )-----------( 293      ( 27 Dec 2017 08:38 )             36.8         141  |  108<H>  |  40.0<H>  ----------------------------<  256<H>  4.0   |  16.0<L>  |  1.45<H>    Ca    10.5<H>      27 Dec 2017 08:38  Phos  3.2         TPro  6.3<L>  /  Alb  3.5  /  TBili  0.7  /  DBili  x   /  AST  14  /  ALT  12  /  AlkPhos  86          Intact PTH: 160 pg/mL ( @ 17:04)          RADIOLOGY & ADDITIONAL TESTS:

## 2017-12-27 NOTE — PROGRESS NOTE ADULT - SUBJECTIVE AND OBJECTIVE BOX
MASOUD ELLIS    [id number]    67y      Male    INTERVAL HPI/OVERNIGHT EVENTS: No events on. Currently being shaved. Offers no complaints. Awaiting MRI to evaluate cervical spinal lesion.     Hospital course:  68 yo M h/o DM, HTN, psychiatric disease on lithium, prostate s/p surgery, chronic indwelling gama for "nonfunctioning bladder" and urinary retention, parathyroid disease s/p resection and CKD presented for left wrist and facial droop. In the ED, pt was found to have hypertensive emergency requiring nicardipine infusion. MRI neck showed abnormal increased T2 signal consistent in the right side of the cord at C5 and C6 level measuring 5 x 6 mm in axial dimension and 19 mm in craniocaudal dimension. MRA neck negative for stenosis. MRI brain negative for ischemic injury. 24hr urine protein study showed creatinine clearance 37. Per renal, given that creatinine clearance is above 30, ok to to perform MRI with contrast.     REVIEW OF SYSTEMS:    CONSTITUTIONAL: No fever   RESPIRATORY: No cough   CARDIOVASCULAR: No chest pain       Vital Signs Last 24 Hrs  T(C): 36.4 (27 Dec 2017 08:27), Max: 37.2 (27 Dec 2017 03:38)  T(F): 97.6 (27 Dec 2017 08:27), Max: 98.9 (27 Dec 2017 03:38)  HR: 98 (27 Dec 2017 08:27) (70 - 101)  BP: 165/84 (27 Dec 2017 08:27) (153/95 - 201/100)  BP(mean): 117 (27 Dec 2017 02:42) (117 - 117)  RR: 18 (27 Dec 2017 08:27) (18 - 22)  SpO2: 97% (27 Dec 2017 08:27) (96% - 97%)    PHYSICAL EXAM:    GENERAL: NAD   HEENT: PERRL, +EOMI, MMM, poor dentition  CHEST/LUNG: Clear to percussion bilaterally   HEART: S1S2+, Regular rate and rhythm   ABDOMEN: Soft, Nontender, Nondistended; Bowel sounds present     LABS:                        12.2   13.2  )-----------( 293      ( 27 Dec 2017 08:38 )             36.8     12-27    141  |  108<H>  |  40.0<H>  ----------------------------<  256<H>  4.0   |  16.0<L>  |  1.45<H>    Ca    10.5<H>      27 Dec 2017 08:38  Phos  3.2     12-26    TPro  6.3<L>  /  Alb  3.5  /  TBili  0.7  /  DBili  x   /  AST  14  /  ALT  12  /  AlkPhos  86  12-27            MEDICATIONS  (STANDING):  amLODIPine   Tablet 10 milliGRAM(s) Oral daily  atorvastatin 40 milliGRAM(s) Oral at bedtime  dexamethasone     Tablet 2 milliGRAM(s) Oral three times a day  dextrose 5%. 1000 milliLiter(s) (50 mL/Hr) IV Continuous <Continuous>  dextrose 50% Injectable 12.5 Gram(s) IV Push once  dextrose 50% Injectable 25 Gram(s) IV Push once  dextrose 50% Injectable 25 Gram(s) IV Push once  insulin glargine Injectable (LANTUS) 25 Unit(s) SubCutaneous at bedtime  insulin lispro (HumaLOG) corrective regimen sliding scale   SubCutaneous Before meals and at bedtime  insulin lispro Injectable (HumaLOG) 2 Unit(s) SubCutaneous three times a day before meals  levothyroxine 75 MICROGram(s) Oral daily  LORazepam   Injectable 1 milliGRAM(s) IV Push once  pantoprazole    Tablet 40 milliGRAM(s) Oral before breakfast  propranolol 20 milliGRAM(s) Oral <User Schedule>  QUEtiapine 25 milliGRAM(s) Oral two times a day  sodium chloride 0.45% 1000 milliLiter(s) (50 mL/Hr) IV Continuous <Continuous>  sucralfate 1 Gram(s) Oral Before meals and at bedtime  tamsulosin 0.4 milliGRAM(s) Oral at bedtime    MEDICATIONS  (PRN):  dextrose Gel 1 Dose(s) Oral once PRN Blood Glucose LESS THAN 70 milliGRAM(s)/deciliter  glucagon  Injectable 1 milliGRAM(s) IntraMuscular once PRN Glucose LESS THAN 70 milligrams/deciliter  hydrALAZINE Injectable 10 milliGRAM(s) IV Push every 8 hours PRN if sbp >170 or dbp >100      RADIOLOGY & ADDITIONAL TESTS:

## 2017-12-27 NOTE — PROGRESS NOTE ADULT - PROBLEM SELECTOR PLAN 1
MRI reviewed  Appreciate neurosurgery consult - will need MRI with contrast. Consent obtained with Naz Colorado over the phone.  D/w Dr. Santo (oncall renal) - measured creatinine clearance 37, ok to perform MRI with contrast.   C/w dexamethasone 2mg PO tid

## 2017-12-28 LAB
ALBUMIN SERPL ELPH-MCNC: 3.1 G/DL — LOW (ref 3.3–5.2)
ALP SERPL-CCNC: 78 U/L — SIGNIFICANT CHANGE UP (ref 40–120)
ALT FLD-CCNC: 13 U/L — SIGNIFICANT CHANGE UP
ANION GAP SERPL CALC-SCNC: 14 MMOL/L — SIGNIFICANT CHANGE UP (ref 5–17)
AST SERPL-CCNC: 19 U/L — SIGNIFICANT CHANGE UP
BILIRUB SERPL-MCNC: 0.6 MG/DL — SIGNIFICANT CHANGE UP (ref 0.4–2)
BUN SERPL-MCNC: 41 MG/DL — HIGH (ref 8–20)
CALCIUM SERPL-MCNC: 10.5 MG/DL — HIGH (ref 8.6–10.2)
CHLORIDE SERPL-SCNC: 112 MMOL/L — HIGH (ref 98–107)
CO2 SERPL-SCNC: 18 MMOL/L — LOW (ref 22–29)
CREAT SERPL-MCNC: 1.54 MG/DL — HIGH (ref 0.5–1.3)
GLUCOSE BLDC GLUCOMTR-MCNC: 120 MG/DL — HIGH (ref 70–99)
GLUCOSE BLDC GLUCOMTR-MCNC: 180 MG/DL — HIGH (ref 70–99)
GLUCOSE BLDC GLUCOMTR-MCNC: 229 MG/DL — HIGH (ref 70–99)
GLUCOSE BLDC GLUCOMTR-MCNC: 90 MG/DL — SIGNIFICANT CHANGE UP (ref 70–99)
GLUCOSE SERPL-MCNC: 126 MG/DL — HIGH (ref 70–115)
HCT VFR BLD CALC: 35.7 % — LOW (ref 42–52)
HGB BLD-MCNC: 11.7 G/DL — LOW (ref 14–18)
MCHC RBC-ENTMCNC: 29.3 PG — SIGNIFICANT CHANGE UP (ref 27–31)
MCHC RBC-ENTMCNC: 32.8 G/DL — SIGNIFICANT CHANGE UP (ref 32–36)
MCV RBC AUTO: 89.3 FL — SIGNIFICANT CHANGE UP (ref 80–94)
PLATELET # BLD AUTO: 275 K/UL — SIGNIFICANT CHANGE UP (ref 150–400)
POTASSIUM SERPL-MCNC: 3.8 MMOL/L — SIGNIFICANT CHANGE UP (ref 3.5–5.3)
POTASSIUM SERPL-SCNC: 3.8 MMOL/L — SIGNIFICANT CHANGE UP (ref 3.5–5.3)
PROT SERPL-MCNC: 5.5 G/DL — LOW (ref 6.6–8.7)
RBC # BLD: 4 M/UL — LOW (ref 4.6–6.2)
RBC # FLD: 15.5 % — SIGNIFICANT CHANGE UP (ref 11–15.6)
SODIUM SERPL-SCNC: 144 MMOL/L — SIGNIFICANT CHANGE UP (ref 135–145)
WBC # BLD: 14.1 K/UL — HIGH (ref 4.8–10.8)
WBC # FLD AUTO: 14.1 K/UL — HIGH (ref 4.8–10.8)

## 2017-12-28 PROCEDURE — 99232 SBSQ HOSP IP/OBS MODERATE 35: CPT

## 2017-12-28 PROCEDURE — 72156 MRI NECK SPINE W/O & W/DYE: CPT | Mod: 26

## 2017-12-28 PROCEDURE — 99233 SBSQ HOSP IP/OBS HIGH 50: CPT

## 2017-12-28 RX ORDER — HYDRALAZINE HCL 50 MG
50 TABLET ORAL EVERY 8 HOURS
Qty: 0 | Refills: 0 | Status: DISCONTINUED | OUTPATIENT
Start: 2017-12-28 | End: 2018-01-02

## 2017-12-28 RX ORDER — HALOPERIDOL DECANOATE 100 MG/ML
2 INJECTION INTRAMUSCULAR EVERY 6 HOURS
Qty: 0 | Refills: 0 | Status: DISCONTINUED | OUTPATIENT
Start: 2017-12-28 | End: 2017-12-28

## 2017-12-28 RX ADMIN — Medication 2 UNIT(S): at 08:39

## 2017-12-28 RX ADMIN — INSULIN GLARGINE 25 UNIT(S): 100 INJECTION, SOLUTION SUBCUTANEOUS at 22:41

## 2017-12-28 RX ADMIN — Medication 1: at 22:41

## 2017-12-28 RX ADMIN — Medication 1 GRAM(S): at 17:35

## 2017-12-28 RX ADMIN — PANTOPRAZOLE SODIUM 40 MILLIGRAM(S): 20 TABLET, DELAYED RELEASE ORAL at 05:44

## 2017-12-28 RX ADMIN — Medication 50 MILLIGRAM(S): at 22:41

## 2017-12-28 RX ADMIN — ATORVASTATIN CALCIUM 40 MILLIGRAM(S): 80 TABLET, FILM COATED ORAL at 22:41

## 2017-12-28 RX ADMIN — Medication 2 UNIT(S): at 17:35

## 2017-12-28 RX ADMIN — Medication 2 MILLIGRAM(S): at 05:44

## 2017-12-28 RX ADMIN — Medication 2 UNIT(S): at 12:00

## 2017-12-28 RX ADMIN — TAMSULOSIN HYDROCHLORIDE 0.4 MILLIGRAM(S): 0.4 CAPSULE ORAL at 22:41

## 2017-12-28 RX ADMIN — Medication 40 MILLIGRAM(S): at 05:44

## 2017-12-28 RX ADMIN — SODIUM CHLORIDE 50 MILLILITER(S): 9 INJECTION, SOLUTION INTRAVENOUS at 01:35

## 2017-12-28 RX ADMIN — AMLODIPINE BESYLATE 10 MILLIGRAM(S): 2.5 TABLET ORAL at 05:44

## 2017-12-28 RX ADMIN — Medication 1 GRAM(S): at 12:00

## 2017-12-28 RX ADMIN — Medication 2 MILLIGRAM(S): at 22:41

## 2017-12-28 RX ADMIN — Medication 75 MICROGRAM(S): at 05:44

## 2017-12-28 RX ADMIN — Medication 2: at 17:35

## 2017-12-28 RX ADMIN — Medication 10 MILLIGRAM(S): at 02:09

## 2017-12-28 RX ADMIN — Medication 2 MILLIGRAM(S): at 13:58

## 2017-12-28 RX ADMIN — QUETIAPINE FUMARATE 25 MILLIGRAM(S): 200 TABLET, FILM COATED ORAL at 05:44

## 2017-12-28 RX ADMIN — Medication 1 GRAM(S): at 05:44

## 2017-12-28 RX ADMIN — SODIUM CHLORIDE 50 MILLILITER(S): 9 INJECTION, SOLUTION INTRAVENOUS at 11:59

## 2017-12-28 RX ADMIN — Medication 40 MILLIGRAM(S): at 17:35

## 2017-12-28 RX ADMIN — Medication 1 GRAM(S): at 22:41

## 2017-12-28 RX ADMIN — Medication 50 MILLIGRAM(S): at 13:59

## 2017-12-28 RX ADMIN — Medication 1 MILLIGRAM(S): at 09:28

## 2017-12-28 RX ADMIN — QUETIAPINE FUMARATE 25 MILLIGRAM(S): 200 TABLET, FILM COATED ORAL at 17:35

## 2017-12-28 NOTE — PHYSICAL THERAPY INITIAL EVALUATION ADULT - ADDITIONAL COMMENTS
per patient, he was ambulatory prior to admission in rehab. reports he was able to walk without an AD.

## 2017-12-28 NOTE — PROGRESS NOTE ADULT - PROBLEM SELECTOR PLAN 4
completed  zosyn 14days  Pt without signs or symptoms of systemic infection at this time.   monitor off abx

## 2017-12-28 NOTE — PROGRESS NOTE ADULT - SUBJECTIVE AND OBJECTIVE BOX
NSGY Attg:    Case discussed with patient's daughter (Naz Colorado: [phone]).  Per Ms. Colorado, the patient was previously independent and able to care for his wife as recently as 8/2017.  His wife passed in 9/2017.  Since that time he has had a decline in his mental status with several hospital admissions for AMS, electrolyte abnormalities, and worsening renal function.  Prior to this admission, patient has been residing at [facility] in [place].  He presents at this time for a facial droop and left wrist extension weakness which prompted a stroke work-up.  MRI of the cervical spine shows stenosis at C5-6 with cord signal change (with minimal enhancement on most recent MRI w and wo contrast; which also shows some improvement in T2 signal change).  The patient does have a history of multiple falls, most recently 2 weeks ago per Ms. Colorado.    Exam:  arousable  confused  PERRL  EOMI  FS grossly  ESCOBAR to command  RUE 5/5  LUE D B T HG 5/5; WE 4-/5  LE 5/5 bilaterally  no Batista's   no clonus    Based on the history, MRI findings seem most consistent with chronic stenosis.  Signal change may be secondary to chronic stenosis or may be subacute secondary to recent fall/trauma.  The cervical MRI findings do not account for the patient's AMS, and are unlikely to be associated with a sudden onset unilateral wrist drop.  The patient is ambulatory with assistance and has no other focal motor deficit to my exam.  He has no Batista's or clonus.  I will follow the patient for cervical stenosis and would be happy to evaluate him as an outpatient.  At this time, would recommend continued medical work-up per primary team and neurology for additional etiologies of AMS and cervical cord signal change.

## 2017-12-28 NOTE — PROGRESS NOTE ADULT - SUBJECTIVE AND OBJECTIVE BOX
INTERVAL HPI/OVERNIGHT EVENTS:  67 M PMH bipolar disorder on lithium with renal insufficiency admitted 12/20 from [facility] w/ new onset L wrist drop and L facial droop. MRI showed abnormal increased T2 signal on R side of cord C5-C6, MRI with contrast done, showing mildly improved increased T2 signal. Patient seen lying in bed, no complaints, states he feels good, denies pain, weakness, paresthesias.    Vital Signs Last 24 Hrs  T(C): 36.6 (28 Dec 2017 07:48), Max: 36.7 (27 Dec 2017 15:49)  T(F): 97.8 (28 Dec 2017 07:48), Max: 98.1 (27 Dec 2017 15:49)  HR: 63 (28 Dec 2017 07:48) (63 - 101)  BP: 178/86 (28 Dec 2017 07:48) (142/74 - 178/86)  BP(mean): --  RR: 18 (28 Dec 2017 07:48) (18 - 18)  SpO2: 96% (28 Dec 2017 07:48) (96% - 98%)    PHYSICAL EXAM:  GENERAL: NAD, thin, frail, generalized atrophy  HEAD:  Atraumatic, normocephalic  WOUND: Dressing clean dry intact  MENTAL STATUS: Sleeping, NAD. Opens eyes to voice; Minimally verbal; following commands  CRANIAL NERVES: PERRL; EOMI; no facial asymmetry; facial sensation grossly intact to light touch b/l;  tongue midline  MOTOR: strength 5/5 b/l ower extremities  Uppers     Delt     Bicep     Tricep     HG  R                5/5       5/5      5/5        5/5; Wrist extension 5/5  L                5/5       5/5       5/5       4+/5; Wrist extension 4/5  SENSATION: grossly intact to light touch all extremities    LABS:                        11.7   14.1  )-----------( 275      ( 28 Dec 2017 11:53 )             35.7     12-28    144  |  112<H>  |  41.0<H>  ----------------------------<  126<H>  3.8   |  18.0<L>  |  1.54<H>    Ca    10.5<H>      28 Dec 2017 11:53    TPro  5.5<L>  /  Alb  3.1<L>  /  TBili  0.6  /  DBili  x   /  AST  19  /  ALT  13  /  AlkPhos  78  12-28 12-27 @ 07:01  -  12-28 @ 07:00  --------------------------------------------------------  IN: 4150 mL / OUT: 6700 mL / NET: -2550 mL    RADIOLOGY & ADDITIONAL TESTS:  - from: MR Cervical Spine w/wo IV Cont (12.28.17 @ 10:59)  IMPRESSION:  Limited by motion artifact.  Improved abnormal T2 signal enhancement in the cervical cord at the level   of C5/C6.    - from: MR Angio Neck No Cont (12.20.17 @ 21:46)  IMPRESSION:       No hemodynamically significant stenosis at the origin of the internal   carotid arteries.    - from: MR Cervical Spine No Cont (12.20.17 @ 21:46)  IMPRESSION:   Abnormal increased T2 signal consistent in the right side of the cord at   C5 and C6 level measuring 5 x 6 mm in axial dimension and 19 mm in   craniocaudal dimension. This lesion shows blooming on the gradient echo   MERGE images likely due to hemosiderin hemosiderin. Postcontrast images   are recommended.

## 2017-12-28 NOTE — PROGRESS NOTE ADULT - SUBJECTIVE AND OBJECTIVE BOX
MASOUD ELLIS    [id number]    67y      Male    CC: lt Wrist drop and facial droop    INTERVAL HPI/OVERNIGHT EVENTS: No events on. Awaiting MRI to evaluate cervical spinal lesion.     Hospital course:  66 yo M h/o DM, HTN, psychiatric disease on lithium, prostate s/p surgery, chronic indwelling gama for "nonfunctioning bladder" and urinary retention, parathyroid disease s/p resection and CKD presented for left wrist and facial droop. In the ED, pt was found to have hypertensive emergency requiring nicardipine infusion. MRI neck showed abnormal increased T2 signal consistent in the right side of the cord at C5 and C6 level measuring 5 x 6 mm in axial dimension and 19 mm in craniocaudal dimension. MRA neck negative for stenosis. MRI brain negative for ischemic injury. 24hr urine protein study showed creatinine clearance 37. Per renal, given that creatinine clearance is above 30, ok to to perform MRI with contrast.     REVIEW OF SYSTEMS:    CONSTITUTIONAL: No fever   RESPIRATORY: No cough   CARDIOVASCULAR: No chest pain       Vital Signs Last 24 Hrs  T(C): 36.6 (28 Dec 2017 07:48), Max: 36.7 (27 Dec 2017 15:49)  T(F): 97.8 (28 Dec 2017 07:48), Max: 98.1 (27 Dec 2017 15:49)  HR: 63 (28 Dec 2017 07:48) (63 - 101)  BP: 178/86 (28 Dec 2017 07:48) (142/74 - 178/86)  BP(mean): --  RR: 18 (28 Dec 2017 07:48) (18 - 18)  SpO2: 96% (28 Dec 2017 07:48) (96% - 98%)      PHYSICAL EXAM:    GENERAL: NAD   HEENT: PERRL, +EOMI, MMM, poor dentition  CHEST/LUNG: Clear to percussion bilaterally   HEART: S1S2+, Regular rate and rhythm   ABDOMEN: Soft, Nontender, Nondistended; Bowel sounds present              PHYSICAL EXAM:    GENERAL: NAD, well-groomed  HEENT: PERRL, +EOMI  NECK: soft, Supple, No JVD,   CHEST/LUNG: Clear to percussion bilaterally; No wheezing  HEART: S1S2+, Regular rate and rhythm; No murmurs, rubs, or gallops  ABDOMEN: Soft, Nontender, Nondistended; Bowel sounds present  EXTREMITIES:  2+ Peripheral Pulses, No clubbing, cyanosis, or edema  SKIN: No rashes or lesions  NEURO: AAOX3, no focal deficits, no motor r sensory loss  PSYCH: normal mood      12-27 @ 07:01  -  12-28 @ 07:00  --------------------------------------------------------  IN: 4150 mL / OUT: 6700 mL / NET: -2550 mL        LABS:                        12.2   13.2  )-----------( 293      ( 27 Dec 2017 08:38 )             36.8     12-27    141  |  108<H>  |  40.0<H>  ----------------------------<  256<H>  4.0   |  16.0<L>  |  1.45<H>    Ca    10.5<H>      27 Dec 2017 08:38    TPro  6.3<L>  /  Alb  3.5  /  TBili  0.7  /  DBili  x   /  AST  14  /  ALT  12  /  AlkPhos  86  12-27            MEDICATIONS  (STANDING):  amLODIPine   Tablet 10 milliGRAM(s) Oral daily  atorvastatin 40 milliGRAM(s) Oral at bedtime  dexamethasone     Tablet 2 milliGRAM(s) Oral three times a day  dextrose 5%. 1000 milliLiter(s) (50 mL/Hr) IV Continuous <Continuous>  dextrose 50% Injectable 12.5 Gram(s) IV Push once  dextrose 50% Injectable 25 Gram(s) IV Push once  dextrose 50% Injectable 25 Gram(s) IV Push once  hydrALAZINE 50 milliGRAM(s) Oral every 8 hours  insulin glargine Injectable (LANTUS) 25 Unit(s) SubCutaneous at bedtime  insulin lispro (HumaLOG) corrective regimen sliding scale   SubCutaneous Before meals and at bedtime  insulin lispro Injectable (HumaLOG) 2 Unit(s) SubCutaneous three times a day before meals  levothyroxine 75 MICROGram(s) Oral daily  LORazepam   Injectable 1 milliGRAM(s) IV Push once  pantoprazole    Tablet 40 milliGRAM(s) Oral before breakfast  propranolol 40 milliGRAM(s) Oral two times a day  QUEtiapine 25 milliGRAM(s) Oral two times a day  sodium chloride 0.45% 1000 milliLiter(s) (50 mL/Hr) IV Continuous <Continuous>  sucralfate 1 Gram(s) Oral Before meals and at bedtime  tamsulosin 0.4 milliGRAM(s) Oral at bedtime    MEDICATIONS  (PRN):  dextrose Gel 1 Dose(s) Oral once PRN Blood Glucose LESS THAN 70 milliGRAM(s)/deciliter  glucagon  Injectable 1 milliGRAM(s) IntraMuscular once PRN Glucose LESS THAN 70 milligrams/deciliter  haloperidol     Tablet 2 milliGRAM(s) Oral every 6 hours PRN Agitation  hydrALAZINE Injectable 10 milliGRAM(s) IV Push every 8 hours PRN if sbp >170 or dbp >100      RADIOLOGY & ADDITIONAL TESTS:  MRI reviewed MASOUD ELLIS    [id number]    67y      Male    CC: lt Wrist drop and facial droop    INTERVAL HPI/OVERNIGHT EVENTS: No events on. Awaiting MRI to evaluate cervical spinal lesion.     Hospital course:  66 yo M h/o DM, HTN, psychiatric disease on lithium, prostate s/p surgery, chronic indwelling gama for "nonfunctioning bladder" and urinary retention, parathyroid disease s/p resection and CKD presented for left wrist and facial droop. In the ED, pt was found to have hypertensive emergency requiring nicardipine infusion. MRI neck showed abnormal increased T2 signal consistent in the right side of the cord at C5 and C6 level measuring 5 x 6 mm in axial dimension and 19 mm in craniocaudal dimension. MRA neck negative for stenosis. MRI brain negative for ischemic injury. 24hr urine protein study showed creatinine clearance 37. Per renal, given that creatinine clearance is above 30, ok to to perform MRI with contrast.     REVIEW OF SYSTEMS:    CONSTITUTIONAL: No fever , eating well  RESPIRATORY: No cough   CARDIOVASCULAR: No chest pain       Vital Signs Last 24 Hrs  T(C): 36.6 (28 Dec 2017 07:48), Max: 36.7 (27 Dec 2017 15:49)  T(F): 97.8 (28 Dec 2017 07:48), Max: 98.1 (27 Dec 2017 15:49)  HR: 63 (28 Dec 2017 07:48) (63 - 101)  BP: 178/86 (28 Dec 2017 07:48) (142/74 - 178/86)  BP(mean): --  RR: 18 (28 Dec 2017 07:48) (18 - 18)  SpO2: 96% (28 Dec 2017 07:48) (96% - 98%)      PHYSICAL EXAM:    GENERAL: NAD   HEENT: PERRL, +EOMI, MMM, poor dentition  CHEST/LUNG: Clear to percussion bilaterally   HEART: S1S2+, Regular rate and rhythm   ABDOMEN: Soft, Nontender, Nondistended; Bowel sounds present  EXTREMITIES:  , No clubbing, cyanosis, or edema  Neuro - AXo 1-2 - confused, agitated, wants to go home              12-27 @ 07:01  -  12-28 @ 07:00  --------------------------------------------------------  IN: 4150 mL / OUT: 6700 mL / NET: -2550 mL        LABS:                        12.2   13.2  )-----------( 293      ( 27 Dec 2017 08:38 )             36.8     12-27    141  |  108<H>  |  40.0<H>  ----------------------------<  256<H>  4.0   |  16.0<L>  |  1.45<H>    Ca    10.5<H>      27 Dec 2017 08:38    TPro  6.3<L>  /  Alb  3.5  /  TBili  0.7  /  DBili  x   /  AST  14  /  ALT  12  /  AlkPhos  86  12-27            MEDICATIONS  (STANDING):  amLODIPine   Tablet 10 milliGRAM(s) Oral daily  atorvastatin 40 milliGRAM(s) Oral at bedtime  dexamethasone     Tablet 2 milliGRAM(s) Oral three times a day  dextrose 5%. 1000 milliLiter(s) (50 mL/Hr) IV Continuous <Continuous>  dextrose 50% Injectable 12.5 Gram(s) IV Push once  dextrose 50% Injectable 25 Gram(s) IV Push once  dextrose 50% Injectable 25 Gram(s) IV Push once  hydrALAZINE 50 milliGRAM(s) Oral every 8 hours  insulin glargine Injectable (LANTUS) 25 Unit(s) SubCutaneous at bedtime  insulin lispro (HumaLOG) corrective regimen sliding scale   SubCutaneous Before meals and at bedtime  insulin lispro Injectable (HumaLOG) 2 Unit(s) SubCutaneous three times a day before meals  levothyroxine 75 MICROGram(s) Oral daily  LORazepam   Injectable 1 milliGRAM(s) IV Push once  pantoprazole    Tablet 40 milliGRAM(s) Oral before breakfast  propranolol 40 milliGRAM(s) Oral two times a day  QUEtiapine 25 milliGRAM(s) Oral two times a day  sodium chloride 0.45% 1000 milliLiter(s) (50 mL/Hr) IV Continuous <Continuous>  sucralfate 1 Gram(s) Oral Before meals and at bedtime  tamsulosin 0.4 milliGRAM(s) Oral at bedtime    MEDICATIONS  (PRN):  dextrose Gel 1 Dose(s) Oral once PRN Blood Glucose LESS THAN 70 milliGRAM(s)/deciliter  glucagon  Injectable 1 milliGRAM(s) IntraMuscular once PRN Glucose LESS THAN 70 milligrams/deciliter  haloperidol     Tablet 2 milliGRAM(s) Oral every 6 hours PRN Agitation  hydrALAZINE Injectable 10 milliGRAM(s) IV Push every 8 hours PRN if sbp >170 or dbp >100      RADIOLOGY & ADDITIONAL TESTS:  MRI reviewed

## 2017-12-28 NOTE — PROGRESS NOTE ADULT - SUBJECTIVE AND OBJECTIVE BOX
NEPHROLOGY INTERVAL HPI/OVERNIGHT EVENTS:    Examined earlier  Looks comfortable    MEDICATIONS  (STANDING):  amLODIPine   Tablet 10 milliGRAM(s) Oral daily  atorvastatin 40 milliGRAM(s) Oral at bedtime  dexamethasone     Tablet 2 milliGRAM(s) Oral three times a day  dextrose 5%. 1000 milliLiter(s) (50 mL/Hr) IV Continuous <Continuous>  dextrose 50% Injectable 12.5 Gram(s) IV Push once  dextrose 50% Injectable 25 Gram(s) IV Push once  dextrose 50% Injectable 25 Gram(s) IV Push once  hydrALAZINE 50 milliGRAM(s) Oral every 8 hours  insulin glargine Injectable (LANTUS) 25 Unit(s) SubCutaneous at bedtime  insulin lispro (HumaLOG) corrective regimen sliding scale   SubCutaneous Before meals and at bedtime  insulin lispro Injectable (HumaLOG) 2 Unit(s) SubCutaneous three times a day before meals  levothyroxine 75 MICROGram(s) Oral daily  LORazepam   Injectable 1 milliGRAM(s) IV Push once  pantoprazole    Tablet 40 milliGRAM(s) Oral before breakfast  propranolol 40 milliGRAM(s) Oral two times a day  QUEtiapine 25 milliGRAM(s) Oral two times a day  sodium chloride 0.45% 1000 milliLiter(s) (50 mL/Hr) IV Continuous <Continuous>  sucralfate 1 Gram(s) Oral Before meals and at bedtime  tamsulosin 0.4 milliGRAM(s) Oral at bedtime    MEDICATIONS  (PRN):  dextrose Gel 1 Dose(s) Oral once PRN Blood Glucose LESS THAN 70 milliGRAM(s)/deciliter  glucagon  Injectable 1 milliGRAM(s) IntraMuscular once PRN Glucose LESS THAN 70 milligrams/deciliter  haloperidol     Tablet 2 milliGRAM(s) Oral every 6 hours PRN Agitation  hydrALAZINE Injectable 10 milliGRAM(s) IV Push every 8 hours PRN if sbp >170 or dbp >100      Allergies    No Known Allergies    Intolerances        Vital Signs Last 24 Hrs  T(C): 36.6 (28 Dec 2017 07:48), Max: 36.7 (27 Dec 2017 15:49)  T(F): 97.8 (28 Dec 2017 07:48), Max: 98.1 (27 Dec 2017 15:49)  HR: 63 (28 Dec 2017 07:48) (63 - 101)  BP: 178/86 (28 Dec 2017 07:48) (142/74 - 178/86)  BP(mean): --  RR: 18 (28 Dec 2017 07:48) (18 - 18)  SpO2: 96% (28 Dec 2017 07:48) (96% - 98%)  Daily     Daily     PHYSICAL EXAM:  GENERAL: NAD,   EYES:  conjunctiva and sclera clear  NECK: Supple, No JVD/Bruit  NERVOUS SYSTEM:  A/O x3,   CHEST:  CTA ,No rales   HEART:  RRR, No murmurs  ABDOMEN: Soft, NT/ND BS+  EXTREMITIES:  No Edema      LABS:                        12.2   13.2  )-----------( 293      ( 27 Dec 2017 08:38 )             36.8     12-27    141  |  108<H>  |  40.0<H>  ----------------------------<  256<H>  4.0   |  16.0<L>  |  1.45<H>    Ca    10.5<H>      27 Dec 2017 08:38    TPro  6.3<L>  /  Alb  3.5  /  TBili  0.7  /  DBili  x   /  AST  14  /  ALT  12  /  AlkPhos  86  12-27                RADIOLOGY & ADDITIONAL TESTS:

## 2017-12-28 NOTE — PROGRESS NOTE ADULT - PROBLEM SELECTOR PLAN 1
MRI reviewed  Appreciate neurosurgery consult - will need MRI with contrast. Consent obtained .  D/w Dr. Santo - measured creatinine clearance 37, ok to perform MRI with contrast.   C/w dexamethasone 2mg PO tid

## 2017-12-28 NOTE — PHYSICAL THERAPY INITIAL EVALUATION ADULT - PERTINENT HX OF CURRENT PROBLEM, REHAB EVAL
Pt presents to [facility] from subacute rehabilitation, presenting with facial droop and left wrist drop

## 2017-12-28 NOTE — PHYSICAL THERAPY INITIAL EVALUATION ADULT - MD/RN NOTIFIED
yes Referred To Plastics For Closure Text (Leave Blank If You Do Not Want): After obtaining clear surgical margins the patient was sent to plastics for surgical repair.  The patient understands they will receive post-surgical care and follow-up from the referring physician's office.

## 2017-12-29 DIAGNOSIS — D72.829 ELEVATED WHITE BLOOD CELL COUNT, UNSPECIFIED: ICD-10-CM

## 2017-12-29 LAB
ANION GAP SERPL CALC-SCNC: 15 MMOL/L — SIGNIFICANT CHANGE UP (ref 5–17)
APPEARANCE UR: CLEAR — SIGNIFICANT CHANGE UP
BACTERIA # UR AUTO: ABNORMAL
BILIRUB UR-MCNC: NEGATIVE — SIGNIFICANT CHANGE UP
BUN SERPL-MCNC: 49 MG/DL — HIGH (ref 8–20)
CALCIUM SERPL-MCNC: 10.5 MG/DL — HIGH (ref 8.6–10.2)
CHLORIDE SERPL-SCNC: 108 MMOL/L — HIGH (ref 98–107)
CO2 SERPL-SCNC: 19 MMOL/L — LOW (ref 22–29)
COLOR SPEC: YELLOW — SIGNIFICANT CHANGE UP
CREAT SERPL-MCNC: 1.86 MG/DL — HIGH (ref 0.5–1.3)
DIFF PNL FLD: ABNORMAL
GLUCOSE BLDC GLUCOMTR-MCNC: 114 MG/DL — HIGH (ref 70–99)
GLUCOSE BLDC GLUCOMTR-MCNC: 195 MG/DL — HIGH (ref 70–99)
GLUCOSE BLDC GLUCOMTR-MCNC: 202 MG/DL — HIGH (ref 70–99)
GLUCOSE BLDC GLUCOMTR-MCNC: 258 MG/DL — HIGH (ref 70–99)
GLUCOSE SERPL-MCNC: 260 MG/DL — HIGH (ref 70–115)
GLUCOSE UR QL: 1000 MG/DL
HCT VFR BLD CALC: 39 % — LOW (ref 42–52)
HGB BLD-MCNC: 12.4 G/DL — LOW (ref 14–18)
KETONES UR-MCNC: NEGATIVE — SIGNIFICANT CHANGE UP
LEUKOCYTE ESTERASE UR-ACNC: ABNORMAL
MCHC RBC-ENTMCNC: 29 PG — SIGNIFICANT CHANGE UP (ref 27–31)
MCHC RBC-ENTMCNC: 31.8 G/DL — LOW (ref 32–36)
MCV RBC AUTO: 91.1 FL — SIGNIFICANT CHANGE UP (ref 80–94)
NITRITE UR-MCNC: POSITIVE
PH UR: 8 — SIGNIFICANT CHANGE UP (ref 5–8)
PLATELET # BLD AUTO: 263 K/UL — SIGNIFICANT CHANGE UP (ref 150–400)
POTASSIUM SERPL-MCNC: 4.4 MMOL/L — SIGNIFICANT CHANGE UP (ref 3.5–5.3)
POTASSIUM SERPL-SCNC: 4.4 MMOL/L — SIGNIFICANT CHANGE UP (ref 3.5–5.3)
PROT UR-MCNC: 30 MG/DL
RBC # BLD: 4.28 M/UL — LOW (ref 4.6–6.2)
RBC # FLD: 16.2 % — HIGH (ref 11–15.6)
RBC CASTS # UR COMP ASSIST: ABNORMAL /HPF (ref 0–4)
SODIUM SERPL-SCNC: 142 MMOL/L — SIGNIFICANT CHANGE UP (ref 135–145)
SP GR SPEC: 1.01 — SIGNIFICANT CHANGE UP (ref 1.01–1.02)
TRI-PHOS CRY UR QL COMP ASSIST: ABNORMAL
TSH SERPL-MCNC: 0.78 UIU/ML — SIGNIFICANT CHANGE UP (ref 0.27–4.2)
UROBILINOGEN FLD QL: NEGATIVE MG/DL — SIGNIFICANT CHANGE UP
VIT B12 SERPL-MCNC: 938 PG/ML — HIGH (ref 180–914)
WBC # BLD: 17.5 K/UL — HIGH (ref 4.8–10.8)
WBC # FLD AUTO: 17.5 K/UL — HIGH (ref 4.8–10.8)
WBC UR QL: ABNORMAL

## 2017-12-29 PROCEDURE — 99233 SBSQ HOSP IP/OBS HIGH 50: CPT

## 2017-12-29 PROCEDURE — 99232 SBSQ HOSP IP/OBS MODERATE 35: CPT

## 2017-12-29 RX ORDER — DIVALPROEX SODIUM 500 MG/1
250 TABLET, DELAYED RELEASE ORAL EVERY 12 HOURS
Qty: 0 | Refills: 0 | Status: DISCONTINUED | OUTPATIENT
Start: 2017-12-29 | End: 2018-01-02

## 2017-12-29 RX ORDER — CINACALCET 30 MG/1
30 TABLET, FILM COATED ORAL
Qty: 0 | Refills: 0 | Status: DISCONTINUED | OUTPATIENT
Start: 2017-12-29 | End: 2018-01-02

## 2017-12-29 RX ORDER — QUETIAPINE FUMARATE 200 MG/1
50 TABLET, FILM COATED ORAL AT BEDTIME
Qty: 0 | Refills: 0 | Status: DISCONTINUED | OUTPATIENT
Start: 2017-12-29 | End: 2018-01-02

## 2017-12-29 RX ADMIN — Medication 1 GRAM(S): at 21:41

## 2017-12-29 RX ADMIN — Medication 3: at 16:42

## 2017-12-29 RX ADMIN — AMLODIPINE BESYLATE 10 MILLIGRAM(S): 2.5 TABLET ORAL at 05:13

## 2017-12-29 RX ADMIN — ATORVASTATIN CALCIUM 40 MILLIGRAM(S): 80 TABLET, FILM COATED ORAL at 21:38

## 2017-12-29 RX ADMIN — PANTOPRAZOLE SODIUM 40 MILLIGRAM(S): 20 TABLET, DELAYED RELEASE ORAL at 05:13

## 2017-12-29 RX ADMIN — Medication 50 MILLIGRAM(S): at 13:44

## 2017-12-29 RX ADMIN — Medication 2 UNIT(S): at 16:42

## 2017-12-29 RX ADMIN — QUETIAPINE FUMARATE 50 MILLIGRAM(S): 200 TABLET, FILM COATED ORAL at 21:40

## 2017-12-29 RX ADMIN — QUETIAPINE FUMARATE 25 MILLIGRAM(S): 200 TABLET, FILM COATED ORAL at 17:55

## 2017-12-29 RX ADMIN — Medication 2 UNIT(S): at 08:04

## 2017-12-29 RX ADMIN — Medication 2: at 11:49

## 2017-12-29 RX ADMIN — Medication 1 GRAM(S): at 16:43

## 2017-12-29 RX ADMIN — Medication 2 UNIT(S): at 11:49

## 2017-12-29 RX ADMIN — CINACALCET 30 MILLIGRAM(S): 30 TABLET, FILM COATED ORAL at 17:55

## 2017-12-29 RX ADMIN — Medication 40 MILLIGRAM(S): at 17:55

## 2017-12-29 RX ADMIN — Medication 1: at 21:41

## 2017-12-29 RX ADMIN — TAMSULOSIN HYDROCHLORIDE 0.4 MILLIGRAM(S): 0.4 CAPSULE ORAL at 21:38

## 2017-12-29 RX ADMIN — Medication 10 MILLIGRAM(S): at 00:50

## 2017-12-29 RX ADMIN — Medication 75 MICROGRAM(S): at 05:13

## 2017-12-29 RX ADMIN — DIVALPROEX SODIUM 250 MILLIGRAM(S): 500 TABLET, DELAYED RELEASE ORAL at 21:39

## 2017-12-29 RX ADMIN — Medication 2 MILLIGRAM(S): at 05:13

## 2017-12-29 RX ADMIN — QUETIAPINE FUMARATE 25 MILLIGRAM(S): 200 TABLET, FILM COATED ORAL at 05:13

## 2017-12-29 RX ADMIN — Medication 2 MILLIGRAM(S): at 13:44

## 2017-12-29 RX ADMIN — Medication 1 GRAM(S): at 05:14

## 2017-12-29 RX ADMIN — Medication 50 MILLIGRAM(S): at 21:39

## 2017-12-29 RX ADMIN — Medication 1 GRAM(S): at 11:48

## 2017-12-29 RX ADMIN — Medication 40 MILLIGRAM(S): at 05:13

## 2017-12-29 RX ADMIN — Medication 50 MILLIGRAM(S): at 05:13

## 2017-12-29 RX ADMIN — INSULIN GLARGINE 25 UNIT(S): 100 INJECTION, SOLUTION SUBCUTANEOUS at 21:41

## 2017-12-29 RX ADMIN — Medication 2 MILLIGRAM(S): at 21:41

## 2017-12-29 NOTE — PROGRESS NOTE ADULT - SUBJECTIVE AND OBJECTIVE BOX
MASOUD ELLIS    [id number]    67y      Male    Hospital course:  68 yo M h/o DM, HTN, psychiatric disease on lithium, prostate s/p surgery, chronic indwelling gama for "nonfunctioning bladder" and urinary retention, parathyroid disease s/p resection and CKD presented for left wrist and facial droop. In the ED, pt was found to have hypertensive emergency requiring nicardipine infusion. MRI neck showed abnormal increased T2 signal consistent in the right side of the cord at C5 and C6 level measuring 5 x 6 mm in axial dimension and 19 mm in craniocaudal dimension. MRA neck negative for stenosis. MRI brain negative for ischemic injury. MRI cervical spine - broad differential diagnoses and the lesion does not correspond to clinical symptoms per neurosx - not a surgical candidate at this time.       CC: lt Wrist drop and facial droop    INTERVAL HPI/OVERNIGHT EVENTS: Was confused overnight, required soft vest   Still confused, oriented to self only.         REVIEW OF SYSTEMS:    unable to obtain 2/2 confusion     Vital Signs Last 24 Hrs  T(C): 36.5 (29 Dec 2017 08:37), Max: 36.5 (29 Dec 2017 08:37)  T(F): 97.7 (29 Dec 2017 08:37), Max: 97.7 (29 Dec 2017 08:37)  HR: 79 (29 Dec 2017 08:37) (58 - 81)  BP: 119/69 (29 Dec 2017 08:37) (119/69 - 185/81)  BP(mean): --  RR: 18 (29 Dec 2017 08:37) (18 - 18)  SpO2: 95% (29 Dec 2017 08:37) (95% - 98%)    PHYSICAL EXAM:    GENERAL: NAD, sitting in chair  HEENT: PERRL, +EOMI  NECK: soft, Supple   CHEST/LUNG: Clear to percussion bilaterally; No wheezing  HEART: S1S2+, Regular rate and rhythm; No murmurs  EXTREMITIES:   No clubbing, cyanosis, or edema  NEURO: AAOX1 , confused, rest tremors+ has lip movements, constant swallowing action    12-28 @ 07:01  -  12-29 @ 07:00  --------------------------------------------------------  IN: 450 mL / OUT: 1450 mL / NET: -1000 mL    12-29 @ 07:01  - 12-29 @ 10:16  --------------------------------------------------------  IN: 150 mL / OUT: 0 mL / NET: 150 mL        LABS:                        11.7   14.1  )-----------( 275      ( 28 Dec 2017 11:53 )             35.7     12-28    144  |  112<H>  |  41.0<H>  ----------------------------<  126<H>  3.8   |  18.0<L>  |  1.54<H>    Ca    10.5<H>      28 Dec 2017 11:53    TPro  5.5<L>  /  Alb  3.1<L>  /  TBili  0.6  /  DBili  x   /  AST  19  /  ALT  13  /  AlkPhos  78  12-28            MEDICATIONS  (STANDING):  amLODIPine   Tablet 10 milliGRAM(s) Oral daily  atorvastatin 40 milliGRAM(s) Oral at bedtime  dexamethasone     Tablet 2 milliGRAM(s) Oral three times a day  dextrose 5%. 1000 milliLiter(s) (50 mL/Hr) IV Continuous <Continuous>  dextrose 50% Injectable 12.5 Gram(s) IV Push once  dextrose 50% Injectable 25 Gram(s) IV Push once  dextrose 50% Injectable 25 Gram(s) IV Push once  hydrALAZINE 50 milliGRAM(s) Oral every 8 hours  insulin glargine Injectable (LANTUS) 25 Unit(s) SubCutaneous at bedtime  insulin lispro (HumaLOG) corrective regimen sliding scale   SubCutaneous Before meals and at bedtime  insulin lispro Injectable (HumaLOG) 2 Unit(s) SubCutaneous three times a day before meals  levothyroxine 75 MICROGram(s) Oral daily  pantoprazole    Tablet 40 milliGRAM(s) Oral before breakfast  propranolol 40 milliGRAM(s) Oral two times a day  QUEtiapine 25 milliGRAM(s) Oral two times a day  sucralfate 1 Gram(s) Oral Before meals and at bedtime  tamsulosin 0.4 milliGRAM(s) Oral at bedtime    MEDICATIONS  (PRN):  dextrose Gel 1 Dose(s) Oral once PRN Blood Glucose LESS THAN 70 milliGRAM(s)/deciliter  glucagon  Injectable 1 milliGRAM(s) IntraMuscular once PRN Glucose LESS THAN 70 milligrams/deciliter  hydrALAZINE Injectable 10 milliGRAM(s) IV Push every 8 hours PRN if sbp >170 or dbp >100      RADIOLOGY & ADDITIONAL TESTS:    MRI cervical spine, CT head, MRI head - reviewed

## 2017-12-29 NOTE — PROGRESS NOTE ADULT - PROBLEM SELECTOR PLAN 2
has been intermittently confused at least since Dec 20, requiring haldol/ativan - however no documentation,  Ct head , MRI head - no acute abnormality.  Underlying bipolar disorder  Add seroquel at night  Per daughter pt becomes very somnolent with haldol, refused to receive haldol even prn - understands that he needs placement. has been intermittently confused at least since Dec 20, requiring haldol/ativan - however no documentation,  Ct head , MRI head - no acute abnormality.  Underlying bipolar disorder  Add seroquel at night  Per daughter pt becomes very somnolent with haldol, refused to receive haldol even prn - understands that he needs placement.    Discussed with neurology - The tremors and lip movements could be 2/2 seroquel and anti-psych meds, will need outpt f/u for evaluation of parkinson's.

## 2017-12-29 NOTE — PROGRESS NOTE ADULT - SUBJECTIVE AND OBJECTIVE BOX
NEPHROLOGY INTERVAL HPI/OVERNIGHT EVENTS:  pt clinically the same  awake but slow to respond  no acute distress    MEDICATIONS  (STANDING):  amLODIPine   Tablet 10 milliGRAM(s) Oral daily  atorvastatin 40 milliGRAM(s) Oral at bedtime  dexamethasone     Tablet 2 milliGRAM(s) Oral three times a day  dextrose 5%. 1000 milliLiter(s) (50 mL/Hr) IV Continuous <Continuous>  dextrose 50% Injectable 12.5 Gram(s) IV Push once  dextrose 50% Injectable 25 Gram(s) IV Push once  dextrose 50% Injectable 25 Gram(s) IV Push once  hydrALAZINE 50 milliGRAM(s) Oral every 8 hours  insulin glargine Injectable (LANTUS) 25 Unit(s) SubCutaneous at bedtime  insulin lispro (HumaLOG) corrective regimen sliding scale   SubCutaneous Before meals and at bedtime  insulin lispro Injectable (HumaLOG) 2 Unit(s) SubCutaneous three times a day before meals  levothyroxine 75 MICROGram(s) Oral daily  pantoprazole    Tablet 40 milliGRAM(s) Oral before breakfast  propranolol 40 milliGRAM(s) Oral two times a day  QUEtiapine 50 milliGRAM(s) Oral at bedtime  QUEtiapine 25 milliGRAM(s) Oral two times a day  sucralfate 1 Gram(s) Oral Before meals and at bedtime  tamsulosin 0.4 milliGRAM(s) Oral at bedtime    MEDICATIONS  (PRN):  dextrose Gel 1 Dose(s) Oral once PRN Blood Glucose LESS THAN 70 milliGRAM(s)/deciliter  glucagon  Injectable 1 milliGRAM(s) IntraMuscular once PRN Glucose LESS THAN 70 milligrams/deciliter  hydrALAZINE Injectable 10 milliGRAM(s) IV Push every 8 hours PRN if sbp >170 or dbp >100      Allergies    No Known Allergies    Intolerances              Vital Signs Last 24 Hrs  T(C): 36.5 (29 Dec 2017 08:37), Max: 36.5 (29 Dec 2017 08:37)  T(F): 97.7 (29 Dec 2017 08:37), Max: 97.7 (29 Dec 2017 08:37)  HR: 65 (29 Dec 2017 10:27) (58 - 81)  BP: 134/84 (29 Dec 2017 10:27) (119/69 - 185/81)  BP(mean): --  RR: 18 (29 Dec 2017 10:27) (18 - 18)  SpO2: 99% (29 Dec 2017 10:27) (95% - 99%)    PHYSICAL EXAM:  GENERAL: Pt appears chronically ill   EYES:  conjunctiva and sclera clear  NECK: Supple, No JVD/Bruit  NERVOUS SYSTEM:  Awake; slow to respond  CHEST:  CTA , diminished BS at bases  HEART:  RRR, No rub  ABDOMEN: Soft, NT/ND BS+  EXTREMITIES:  No Edema    LABS:                        11.7   14.1  )-----------( 275      ( 28 Dec 2017 11:53 )             35.7     12-28    144  |  112<H>  |  41.0<H>  ----------------------------<  126<H>  3.8   |  18.0<L>  |  1.54<H>    Ca    10.5<H>      28 Dec 2017 11:53    TPro  5.5<L>  /  Alb  3.1<L>  /  TBili  0.6  /  DBili  x   /  AST  19  /  ALT  13  /  AlkPhos  78  12-28    Intact PTH: 160: PTH METHOD: Roche  Guide for Interpretation of PTH and Calcium Results                           Calcium             PTH                           MG/DL               PG/ML  Normal                   8.4-10.5            15-65  Primary  Hyperparathyroidism      >10.5               >50  Non-PTH Hypercalcemia    >10.5               0-20  Hypoparathroidism        <8.4                0-20  Pseudohypoparathyroid    <8.4                >50  This is intended as a guide only. Factors such as sunlight exposure,  Vitamin D status and renal function should be evaluated along with  clinical presentation. pg/mL (12.26.17 @ 17:04)            RADIOLOGY & ADDITIONAL TESTS:

## 2017-12-29 NOTE — PROGRESS NOTE ADULT - PROBLEM SELECTOR PLAN 1
MRI reviewed  Appreciate neurosurgery consult - not a surgical candidate  C/w dexamethasone 2mg PO tid

## 2017-12-29 NOTE — PROGRESS NOTE ADULT - SUBJECTIVE AND OBJECTIVE BOX
INTERVAL HISTORY:  Asked to reassess patient for continued confusion. Discussed with family, Dr Berg  He has long standing h/o bipolar disorder but has been deteriorating since the summer.  Course is complicated by h/o parathyroid surgery, Chronic renal insufficiency,  recurrent UTIs    Family says he was never formally diagnosed with dementia.   They are concerned about parkinson symptoms      VITAL SIGNS:  Vital Signs Last 24 Hrs  T(C): 36.5 (29 Dec 2017 08:37), Max: 36.5 (29 Dec 2017 08:37)  T(F): 97.7 (29 Dec 2017 08:37), Max: 97.7 (29 Dec 2017 08:37)  HR: 65 (29 Dec 2017 10:27) (58 - 81)  BP: 134/84 (29 Dec 2017 10:27) (119/69 - 185/81)  BP(mean): --  RR: 18 (29 Dec 2017 10:27) (18 - 18)  SpO2: 99% (29 Dec 2017 10:27) (95% - 99%)    PHYSICAL EXAMINATION:    Mentation:  awake oriented to name. follows commands  Language/Speech: nl  CN: blank facial expression  Visual Fields:  Motor: bradykinesia    Left wrist drop is improved  Sensory:  DTR:  Babinski:      MEDS:  MEDICATIONS  (STANDING):  amLODIPine   Tablet 10 milliGRAM(s) Oral daily  atorvastatin 40 milliGRAM(s) Oral at bedtime  cinacalcet 30 milliGRAM(s) Oral two times a day  dexamethasone     Tablet 2 milliGRAM(s) Oral three times a day  dextrose 5%. 1000 milliLiter(s) (50 mL/Hr) IV Continuous <Continuous>  dextrose 50% Injectable 12.5 Gram(s) IV Push once  dextrose 50% Injectable 25 Gram(s) IV Push once  dextrose 50% Injectable 25 Gram(s) IV Push once  hydrALAZINE 50 milliGRAM(s) Oral every 8 hours  insulin glargine Injectable (LANTUS) 25 Unit(s) SubCutaneous at bedtime  insulin lispro (HumaLOG) corrective regimen sliding scale   SubCutaneous Before meals and at bedtime  insulin lispro Injectable (HumaLOG) 2 Unit(s) SubCutaneous three times a day before meals  levothyroxine 75 MICROGram(s) Oral daily  pantoprazole    Tablet 40 milliGRAM(s) Oral before breakfast  propranolol 40 milliGRAM(s) Oral two times a day  QUEtiapine 50 milliGRAM(s) Oral at bedtime  QUEtiapine 25 milliGRAM(s) Oral two times a day  sucralfate 1 Gram(s) Oral Before meals and at bedtime  tamsulosin 0.4 milliGRAM(s) Oral at bedtime    MEDICATIONS  (PRN):  dextrose Gel 1 Dose(s) Oral once PRN Blood Glucose LESS THAN 70 milliGRAM(s)/deciliter  glucagon  Injectable 1 milliGRAM(s) IntraMuscular once PRN Glucose LESS THAN 70 milligrams/deciliter  hydrALAZINE Injectable 10 milliGRAM(s) IV Push every 8 hours PRN if sbp >170 or dbp >100      LABS:                          12.4   17.5  )-----------( 263      ( 29 Dec 2017 10:36 )             39.0     12-29    142  |  108<H>  |  49.0<H>  ----------------------------<  260<H>  4.4   |  19.0<L>  |  1.86<H>    Ca    10.5<H>      29 Dec 2017 10:36    TPro  5.5<L>  /  Alb  3.1<L>  /  TBili  0.6  /  DBili  x   /  AST  19  /  ALT  13  /  AlkPhos  78  12-28    LIVER FUNCTIONS - ( 28 Dec 2017 11:53 )  Alb: 3.1 g/dL / Pro: 5.5 g/dL / ALK PHOS: 78 U/L / ALT: 13 U/L / AST: 19 U/L / GGT: x               RADIOLOGY & ADDITIONAL STUDIES:      IMPRESSION & PLAN:    Encephalopathy-  I suspect there is an underlying dementia. Long h/o bipolar disorder for which he has been on psychotropic meds for years  Metabolic factors include  CRF, recurrent UTIs, etc  I do not strongly suspect a primary Parkinsons's  (may consider a Lewy body dementia)    Rec:  Medical management  Psych management  Check B12, TSH  would not treat with Parkinson meds such as sinemet at this time  Will not actively follow.   Please recontact as needed.  May follow up in office

## 2017-12-30 LAB
ALBUMIN SERPL ELPH-MCNC: 2.9 G/DL — LOW (ref 3.3–5.2)
ALP SERPL-CCNC: 68 U/L — SIGNIFICANT CHANGE UP (ref 40–120)
ALT FLD-CCNC: 11 U/L — SIGNIFICANT CHANGE UP
ANION GAP SERPL CALC-SCNC: 11 MMOL/L — SIGNIFICANT CHANGE UP (ref 5–17)
AST SERPL-CCNC: 10 U/L — SIGNIFICANT CHANGE UP
BILIRUB SERPL-MCNC: 0.4 MG/DL — SIGNIFICANT CHANGE UP (ref 0.4–2)
BUN SERPL-MCNC: 46 MG/DL — HIGH (ref 8–20)
CALCIUM SERPL-MCNC: 10 MG/DL — SIGNIFICANT CHANGE UP (ref 8.6–10.2)
CHLORIDE SERPL-SCNC: 106 MMOL/L — SIGNIFICANT CHANGE UP (ref 98–107)
CO2 SERPL-SCNC: 21 MMOL/L — LOW (ref 22–29)
CREAT SERPL-MCNC: 1.7 MG/DL — HIGH (ref 0.5–1.3)
GLUCOSE BLDC GLUCOMTR-MCNC: 135 MG/DL — HIGH (ref 70–99)
GLUCOSE BLDC GLUCOMTR-MCNC: 160 MG/DL — HIGH (ref 70–99)
GLUCOSE BLDC GLUCOMTR-MCNC: 193 MG/DL — HIGH (ref 70–99)
GLUCOSE BLDC GLUCOMTR-MCNC: 219 MG/DL — HIGH (ref 70–99)
GLUCOSE SERPL-MCNC: 143 MG/DL — HIGH (ref 70–115)
HCT VFR BLD CALC: 32.3 % — LOW (ref 42–52)
HGB BLD-MCNC: 10.5 G/DL — LOW (ref 14–18)
MAGNESIUM SERPL-MCNC: 2.3 MG/DL — SIGNIFICANT CHANGE UP (ref 1.6–2.6)
MCHC RBC-ENTMCNC: 29.2 PG — SIGNIFICANT CHANGE UP (ref 27–31)
MCHC RBC-ENTMCNC: 32.5 G/DL — SIGNIFICANT CHANGE UP (ref 32–36)
MCV RBC AUTO: 90 FL — SIGNIFICANT CHANGE UP (ref 80–94)
PHOSPHATE SERPL-MCNC: 3.4 MG/DL — SIGNIFICANT CHANGE UP (ref 2.4–4.7)
PLATELET # BLD AUTO: 197 K/UL — SIGNIFICANT CHANGE UP (ref 150–400)
POTASSIUM SERPL-MCNC: 3.9 MMOL/L — SIGNIFICANT CHANGE UP (ref 3.5–5.3)
POTASSIUM SERPL-SCNC: 3.9 MMOL/L — SIGNIFICANT CHANGE UP (ref 3.5–5.3)
PROT SERPL-MCNC: 5 G/DL — LOW (ref 6.6–8.7)
RBC # BLD: 3.59 M/UL — LOW (ref 4.6–6.2)
RBC # FLD: 15.5 % — SIGNIFICANT CHANGE UP (ref 11–15.6)
SODIUM SERPL-SCNC: 138 MMOL/L — SIGNIFICANT CHANGE UP (ref 135–145)
WBC # BLD: 9.4 K/UL — SIGNIFICANT CHANGE UP (ref 4.8–10.8)
WBC # FLD AUTO: 9.4 K/UL — SIGNIFICANT CHANGE UP (ref 4.8–10.8)

## 2017-12-30 PROCEDURE — 36000 PLACE NEEDLE IN VEIN: CPT

## 2017-12-30 PROCEDURE — 99233 SBSQ HOSP IP/OBS HIGH 50: CPT

## 2017-12-30 PROCEDURE — 76775 US EXAM ABDO BACK WALL LIM: CPT | Mod: 26

## 2017-12-30 PROCEDURE — 76536 US EXAM OF HEAD AND NECK: CPT | Mod: 26

## 2017-12-30 RX ORDER — CEFTRIAXONE 500 MG/1
INJECTION, POWDER, FOR SOLUTION INTRAMUSCULAR; INTRAVENOUS
Qty: 0 | Refills: 0 | Status: DISCONTINUED | OUTPATIENT
Start: 2017-12-30 | End: 2018-01-02

## 2017-12-30 RX ORDER — SODIUM BICARBONATE 1 MEQ/ML
650 SYRINGE (ML) INTRAVENOUS THREE TIMES A DAY
Qty: 0 | Refills: 0 | Status: DISCONTINUED | OUTPATIENT
Start: 2017-12-30 | End: 2018-01-02

## 2017-12-30 RX ORDER — CEFTRIAXONE 500 MG/1
1 INJECTION, POWDER, FOR SOLUTION INTRAMUSCULAR; INTRAVENOUS EVERY 24 HOURS
Qty: 0 | Refills: 0 | Status: DISCONTINUED | OUTPATIENT
Start: 2017-12-31 | End: 2018-01-02

## 2017-12-30 RX ORDER — CEFTRIAXONE 500 MG/1
1 INJECTION, POWDER, FOR SOLUTION INTRAMUSCULAR; INTRAVENOUS ONCE
Qty: 0 | Refills: 0 | Status: COMPLETED | OUTPATIENT
Start: 2017-12-30 | End: 2017-12-30

## 2017-12-30 RX ORDER — DEXAMETHASONE 0.5 MG/5ML
2 ELIXIR ORAL
Qty: 0 | Refills: 0 | Status: DISCONTINUED | OUTPATIENT
Start: 2017-12-30 | End: 2017-12-31

## 2017-12-30 RX ADMIN — Medication 50 MILLIGRAM(S): at 05:38

## 2017-12-30 RX ADMIN — Medication 2 MILLIGRAM(S): at 05:37

## 2017-12-30 RX ADMIN — AMLODIPINE BESYLATE 10 MILLIGRAM(S): 2.5 TABLET ORAL at 05:38

## 2017-12-30 RX ADMIN — Medication 1 GRAM(S): at 11:35

## 2017-12-30 RX ADMIN — CINACALCET 30 MILLIGRAM(S): 30 TABLET, FILM COATED ORAL at 17:26

## 2017-12-30 RX ADMIN — QUETIAPINE FUMARATE 25 MILLIGRAM(S): 200 TABLET, FILM COATED ORAL at 17:26

## 2017-12-30 RX ADMIN — QUETIAPINE FUMARATE 25 MILLIGRAM(S): 200 TABLET, FILM COATED ORAL at 05:37

## 2017-12-30 RX ADMIN — Medication 2 MILLIGRAM(S): at 17:26

## 2017-12-30 RX ADMIN — Medication 75 MICROGRAM(S): at 05:38

## 2017-12-30 RX ADMIN — INSULIN GLARGINE 25 UNIT(S): 100 INJECTION, SOLUTION SUBCUTANEOUS at 21:31

## 2017-12-30 RX ADMIN — Medication 2 UNIT(S): at 16:35

## 2017-12-30 RX ADMIN — CEFTRIAXONE 100 GRAM(S): 500 INJECTION, POWDER, FOR SOLUTION INTRAMUSCULAR; INTRAVENOUS at 17:27

## 2017-12-30 RX ADMIN — Medication 1 GRAM(S): at 16:35

## 2017-12-30 RX ADMIN — Medication 50 MILLIGRAM(S): at 21:30

## 2017-12-30 RX ADMIN — Medication 1: at 21:45

## 2017-12-30 RX ADMIN — Medication 1 GRAM(S): at 21:38

## 2017-12-30 RX ADMIN — Medication 1: at 11:34

## 2017-12-30 RX ADMIN — Medication 1 GRAM(S): at 05:37

## 2017-12-30 RX ADMIN — Medication 10 MILLIGRAM(S): at 00:27

## 2017-12-30 RX ADMIN — TAMSULOSIN HYDROCHLORIDE 0.4 MILLIGRAM(S): 0.4 CAPSULE ORAL at 21:31

## 2017-12-30 RX ADMIN — Medication 650 MILLIGRAM(S): at 21:31

## 2017-12-30 RX ADMIN — DIVALPROEX SODIUM 250 MILLIGRAM(S): 500 TABLET, DELAYED RELEASE ORAL at 05:37

## 2017-12-30 RX ADMIN — ATORVASTATIN CALCIUM 40 MILLIGRAM(S): 80 TABLET, FILM COATED ORAL at 21:30

## 2017-12-30 RX ADMIN — Medication 2: at 16:35

## 2017-12-30 RX ADMIN — DIVALPROEX SODIUM 250 MILLIGRAM(S): 500 TABLET, DELAYED RELEASE ORAL at 17:26

## 2017-12-30 RX ADMIN — Medication 2 UNIT(S): at 11:35

## 2017-12-30 RX ADMIN — PANTOPRAZOLE SODIUM 40 MILLIGRAM(S): 20 TABLET, DELAYED RELEASE ORAL at 05:37

## 2017-12-30 RX ADMIN — CINACALCET 30 MILLIGRAM(S): 30 TABLET, FILM COATED ORAL at 05:38

## 2017-12-30 RX ADMIN — QUETIAPINE FUMARATE 50 MILLIGRAM(S): 200 TABLET, FILM COATED ORAL at 21:30

## 2017-12-30 RX ADMIN — Medication 50 MILLIGRAM(S): at 14:01

## 2017-12-30 RX ADMIN — Medication 650 MILLIGRAM(S): at 14:02

## 2017-12-30 RX ADMIN — Medication 2 UNIT(S): at 07:33

## 2017-12-30 RX ADMIN — Medication 40 MILLIGRAM(S): at 05:37

## 2017-12-30 NOTE — PROGRESS NOTE ADULT - ATTENDING COMMENTS
Pt recommends back to [facility] Hypercalcemia: PTH not suppressed r/o primary HPT  F/U parathyroid US  - added Sensipar 30mg 2 x day  - trend labs      PT recommends back to [facility]

## 2017-12-30 NOTE — PROGRESS NOTE ADULT - PROBLEM SELECTOR PLAN 6
completed  zosyn 14days  Ucx growing GNR, pt has a chronic Hardin which was changed in the ER, afebrile, f/u Ucx, Blood cx

## 2017-12-30 NOTE — PROGRESS NOTE BEHAVIORAL HEALTH - RISK ASSESSMENT
Increased risk for psychosis on steroids.
CHronic risk due to bipolar disorder. Acute risk factors include acute medical issues. HOwever patient denies suicidal and homicidal ideation , has supportive family, assessed to not be at imminent risk to self or others.
Chronic risk due to bipolar disorder. Acute risk factors include acute medical issues. HOwever patient denies suicidal and homicidal ideation , has supportive family, assessed to not be at imminent risk to self or others other than confusion
LOW

## 2017-12-30 NOTE — PROGRESS NOTE ADULT - PROBLEM SELECTOR PLAN 2
has been intermittently confused at least since Dec 20, requiring haldol/ativan - however no documentation,  Ct head , MRI head - no acute abnormality.  Underlying bipolar disorder  Add seroquel at night  Per daughter pt becomes very somnolent with haldol, refused to receive haldol even prn - understands that he needs placement.    Discussed with neurology - The tremors and lip movements could be 2/2 seroquel and anti-psych meds, will need outpt f/u for evaluation of parkinson's.

## 2017-12-30 NOTE — PROGRESS NOTE ADULT - PROBLEM SELECTOR PLAN 1
T2 hyperintensity, Mild stable cord compression at C5-C6  MRI reviewed  Appreciate neurosurgery consult - not a surgical candidate  Disccussed with neurology, will taper dexamethasone 2mg PO tid to bid as per neurology recommendations

## 2017-12-30 NOTE — PROGRESS NOTE ADULT - SUBJECTIVE AND OBJECTIVE BOX
Interval History:  No change overall; no new complaints    MEDICATIONS  (STANDING):  amLODIPine   Tablet 10 milliGRAM(s) Oral daily  atorvastatin 40 milliGRAM(s) Oral at bedtime  cinacalcet 30 milliGRAM(s) Oral two times a day  dexamethasone     Tablet 2 milliGRAM(s) Oral three times a day  dextrose 5%. 1000 milliLiter(s) (50 mL/Hr) IV Continuous <Continuous>  dextrose 50% Injectable 12.5 Gram(s) IV Push once  dextrose 50% Injectable 25 Gram(s) IV Push once  dextrose 50% Injectable 25 Gram(s) IV Push once  diVALproex  milliGRAM(s) Oral every 12 hours  hydrALAZINE 50 milliGRAM(s) Oral every 8 hours  insulin glargine Injectable (LANTUS) 25 Unit(s) SubCutaneous at bedtime  insulin lispro (HumaLOG) corrective regimen sliding scale   SubCutaneous Before meals and at bedtime  insulin lispro Injectable (HumaLOG) 2 Unit(s) SubCutaneous three times a day before meals  levothyroxine 75 MICROGram(s) Oral daily  pantoprazole    Tablet 40 milliGRAM(s) Oral before breakfast  propranolol 40 milliGRAM(s) Oral two times a day  QUEtiapine 50 milliGRAM(s) Oral at bedtime  QUEtiapine 25 milliGRAM(s) Oral two times a day  sodium bicarbonate 650 milliGRAM(s) Oral three times a day  sucralfate 1 Gram(s) Oral Before meals and at bedtime  tamsulosin 0.4 milliGRAM(s) Oral at bedtime    MEDICATIONS  (PRN):  dextrose Gel 1 Dose(s) Oral once PRN Blood Glucose LESS THAN 70 milliGRAM(s)/deciliter  glucagon  Injectable 1 milliGRAM(s) IntraMuscular once PRN Glucose LESS THAN 70 milligrams/deciliter  hydrALAZINE Injectable 10 milliGRAM(s) IV Push every 8 hours PRN if sbp >170 or dbp >100      Allergies    No Known Allergies    Intolerances      PHYSICAL EXAM:  Vital Signs Last 24 Hrs  T(F): 98.4 (17 @ 09:04)  HR: 58 (17 @ 09:04)  BP: 127/67 (17 @ 09:04)  RR: 17 (17 @ 09:04)    GENERAL: NAD, well-groomed, well-developed  HEAD:  Atraumatic, Normocephalic  EYES: EOMI, PERRLA, conjunctiva and sclera clear  NECK: Supple, No JVD, Normal thyroid, no carotid bruit bilateral  NERVOUS SYSTEM:  Alert & Oriented X 2 not date, speech hypophonic and language normal, cranial nerves II-XII normal,   Good concentration; Motor Strength 5/5 B/L with bradykinesia and cogwheeling rigidity; DTRs 2+ intact and symmetric, plantar responses flexor bilaterally, sensory exam normal to light touch,  stance and gait not tested, no dysmetria bilaterally  HEART: Regular rate and rhythm; No murmurs, rubs, or gallops    LABS:                        10.5   9.4   )-----------( 197      ( 30 Dec 2017 09:13 )             32.3     12-30    138  |  106  |  46.0<H>  ----------------------------<  143<H>  3.9   |  21.0<L>  |  1.70<H>    Ca    10.0      30 Dec 2017 09:11  Phos  3.4     12-30  Mg     2.3     12-30    TPro  5.0<L>  /  Alb  2.9<L>  /  TBili  0.4  /  DBili  x   /  AST  10  /  ALT  11  /  AlkPhos  68  12-30    TSH & B12 levels normal  Urinalysis Basic - ( 29 Dec 2017 17:26 )    Color: Yellow / Appearance: Clear / S.015 / pH: x  Gluc: x / Ketone: Negative  / Bili: Negative / Urobili: Negative mg/dL   Blood: x / Protein: 30 mg/dL / Nitrite: Positive   Leuk Esterase: Moderate / RBC: 3-5 /HPF / WBC 11-25   Sq Epi: x / Non Sq Epi: x / Bacteria: Moderate        RADIOLOGY & ADDITIONAL STUDIES:  MR Cervical Spine w/wo IV Cont (.17)  FINDINGS:    Motion artifact limits interpretation. Previously seen abnormal T2   hyperintensity within the right aspect of the cervical cord at level of   C5/C6 appears to be improved. Suggestion of subtle enhancement. Stable   mild cord compression at C5/C6. Differential diagnosis includes cord   edema, demyelinating disease, ischemia, infection, inflammatory, less   likely neoplasm    IMPRESSION:  Limited by motion artifact.  Improved abnormal T2 signal enhancement in the cervical cord at the level   of C5/C6.

## 2017-12-30 NOTE — PROGRESS NOTE ADULT - SUBJECTIVE AND OBJECTIVE BOX
CHIEF COMPLAINT/INTERVAL HISTORY:    Patient is a 67y old  Male who presents with a chief complaint of left wrist and facial droop (26 Dec 2017 00:39)      HPI:  History obtained from chart as patient is currently obtunded status post Ativan for MRI studies. He is a 67 year old male who was sent from [facility] for new onset left wrist drop and left facial droop. Last seen normal at 9 morning.   · Negative Findings: no blurred vision  · Timing: sudden onset  · Duration: hour(s)  4  · Severity: MILD  · Context: unknown  · Witnessed By: nursing home staff  · Aggravating Factors: none  · Relieving Factors: none (20 Dec 2017 22:51)      SUBJECTIVE & OBJECTIVE/ ROS: Pt seen and examined at bedside.  No overnight issues reported. Appears slow to answer     ICU Vital Signs Last 24 Hrs  T(C): 36.9 (30 Dec 2017 09:04), Max: 36.9 (30 Dec 2017 09:04)  T(F): 98.4 (30 Dec 2017 09:04), Max: 98.4 (30 Dec 2017 09:04)  HR: 58 (30 Dec 2017 09:04) (53 - 72)  BP: 127/67 (30 Dec 2017 09:04) (121/72 - 184/85)  BP(mean): --  ABP: --  ABP(mean): --  RR: 17 (30 Dec 2017 09:04) (17 - 19)  SpO2: 97% (30 Dec 2017 09:04) (97% - 99%)        MEDICATIONS  (STANDING):  amLODIPine   Tablet 10 milliGRAM(s) Oral daily  atorvastatin 40 milliGRAM(s) Oral at bedtime  cinacalcet 30 milliGRAM(s) Oral two times a day  dexamethasone     Tablet 2 milliGRAM(s) Oral two times a day  dextrose 5%. 1000 milliLiter(s) (50 mL/Hr) IV Continuous <Continuous>  dextrose 50% Injectable 12.5 Gram(s) IV Push once  dextrose 50% Injectable 25 Gram(s) IV Push once  dextrose 50% Injectable 25 Gram(s) IV Push once  diVALproex  milliGRAM(s) Oral every 12 hours  hydrALAZINE 50 milliGRAM(s) Oral every 8 hours  insulin glargine Injectable (LANTUS) 25 Unit(s) SubCutaneous at bedtime  insulin lispro (HumaLOG) corrective regimen sliding scale   SubCutaneous Before meals and at bedtime  insulin lispro Injectable (HumaLOG) 2 Unit(s) SubCutaneous three times a day before meals  levothyroxine 75 MICROGram(s) Oral daily  pantoprazole    Tablet 40 milliGRAM(s) Oral before breakfast  propranolol 40 milliGRAM(s) Oral two times a day  QUEtiapine 50 milliGRAM(s) Oral at bedtime  QUEtiapine 25 milliGRAM(s) Oral two times a day  sodium bicarbonate 650 milliGRAM(s) Oral three times a day  sucralfate 1 Gram(s) Oral Before meals and at bedtime  tamsulosin 0.4 milliGRAM(s) Oral at bedtime    MEDICATIONS  (PRN):  dextrose Gel 1 Dose(s) Oral once PRN Blood Glucose LESS THAN 70 milliGRAM(s)/deciliter  glucagon  Injectable 1 milliGRAM(s) IntraMuscular once PRN Glucose LESS THAN 70 milligrams/deciliter  hydrALAZINE Injectable 10 milliGRAM(s) IV Push every 8 hours PRN if sbp >170 or dbp >100      LABS:                        10.5   9.4   )-----------( 197      ( 30 Dec 2017 09:13 )             32.3     12-30    138  |  106  |  46.0<H>  ----------------------------<  143<H>  3.9   |  21.0<L>  |  1.70<H>    Ca    10.0      30 Dec 2017 09:11  Phos  3.4     12-30  Mg     2.3     12-30    TPro  5.0<L>  /  Alb  2.9<L>  /  TBili  0.4  /  DBili  x   /  AST  10  /  ALT  11  /  AlkPhos  68  12-30      Urinalysis Basic - ( 29 Dec 2017 17:26 )    Color: Yellow / Appearance: Clear / S.015 / pH: x  Gluc: x / Ketone: Negative  / Bili: Negative / Urobili: Negative mg/dL   Blood: x / Protein: 30 mg/dL / Nitrite: Positive   Leuk Esterase: Moderate / RBC: 3-5 /HPF / WBC 11-25   Sq Epi: x / Non Sq Epi: x / Bacteria: Moderate        CAPILLARY BLOOD GLUCOSE      POCT Blood Glucose.: 135 mg/dL (30 Dec 2017 07:32)  POCT Blood Glucose.: 195 mg/dL (29 Dec 2017 21:35)  POCT Blood Glucose.: 258 mg/dL (29 Dec 2017 16:33)  POCT Blood Glucose.: 202 mg/dL (29 Dec 2017 11:25)      RECENT CULTURES:      RADIOLOGY & ADDITIONAL TESTS:      PHYSICAL EXAM:    GENERAL: NAD  HEENT: PERRL, +EOMI  NECK: soft, Supple   CHEST/LUNG: Clear to percussion bilaterally; No wheezing  HEART: S1S2+, Regular rate and rhythm; No murmurs  EXTREMITIES:   No clubbing, cyanosis, or edema  NEURO: AAOX1, confused, rest tremors+ has lip movements, constant swallowing action

## 2017-12-30 NOTE — PROCEDURE NOTE - NSPROCDETAILS_GEN_ALL_CORE
secured in place/blood seen on insertion/dressing applied/location identified, draped/prepped, sterile technique used/flushes easily

## 2017-12-30 NOTE — PROGRESS NOTE ADULT - SUBJECTIVE AND OBJECTIVE BOX
NEPHROLOGY INTERVAL HPI/OVERNIGHT EVENTS:  pt was clinically stable when seen earlier  no acute distress    MEDICATIONS  (STANDING):  amLODIPine   Tablet 10 milliGRAM(s) Oral daily  atorvastatin 40 milliGRAM(s) Oral at bedtime  cinacalcet 30 milliGRAM(s) Oral two times a day  dexamethasone     Tablet 2 milliGRAM(s) Oral three times a day  dextrose 5%. 1000 milliLiter(s) (50 mL/Hr) IV Continuous <Continuous>  dextrose 50% Injectable 12.5 Gram(s) IV Push once  dextrose 50% Injectable 25 Gram(s) IV Push once  dextrose 50% Injectable 25 Gram(s) IV Push once  diVALproex  milliGRAM(s) Oral every 12 hours  hydrALAZINE 50 milliGRAM(s) Oral every 8 hours  insulin glargine Injectable (LANTUS) 25 Unit(s) SubCutaneous at bedtime  insulin lispro (HumaLOG) corrective regimen sliding scale   SubCutaneous Before meals and at bedtime  insulin lispro Injectable (HumaLOG) 2 Unit(s) SubCutaneous three times a day before meals  levothyroxine 75 MICROGram(s) Oral daily  pantoprazole    Tablet 40 milliGRAM(s) Oral before breakfast  propranolol 40 milliGRAM(s) Oral two times a day  QUEtiapine 50 milliGRAM(s) Oral at bedtime  QUEtiapine 25 milliGRAM(s) Oral two times a day  sucralfate 1 Gram(s) Oral Before meals and at bedtime  tamsulosin 0.4 milliGRAM(s) Oral at bedtime    MEDICATIONS  (PRN):  dextrose Gel 1 Dose(s) Oral once PRN Blood Glucose LESS THAN 70 milliGRAM(s)/deciliter  glucagon  Injectable 1 milliGRAM(s) IntraMuscular once PRN Glucose LESS THAN 70 milligrams/deciliter  hydrALAZINE Injectable 10 milliGRAM(s) IV Push every 8 hours PRN if sbp >170 or dbp >100      Allergies    No Known Allergies          Vital Signs Last 24 Hrs  T(C): 36.9 (30 Dec 2017 09:04), Max: 36.9 (30 Dec 2017 09:04)  T(F): 98.4 (30 Dec 2017 09:04), Max: 98.4 (30 Dec 2017 09:04)  HR: 58 (30 Dec 2017 09:04) (53 - 72)  BP: 127/67 (30 Dec 2017 09:04) (121/72 - 184/85)  BP(mean): --  RR: 17 (30 Dec 2017 09:04) (17 - 19)  SpO2: 97% (30 Dec 2017 09:04) (97% - 99%)    PHYSICAL EXAM:  GENERAL: Pt appears chronically ill   EYES:  conjunctiva and sclera clear  NECK: Supple, No JVD/Bruit  NERVOUS SYSTEM:  Awake; slow to respond  CHEST:  CTA , diminished BS at bases  HEART:  RRR, No rub  ABDOMEN: Soft, NT/ND BS+  EXTREMITIES:  No Edema    LABS:                        12.4   17.5  )-----------( 263      ( 29 Dec 2017 10:36 )             39.0     12    142  |  108<H>  |  49.0<H>  ----------------------------<  260<H>  4.4   |  19.0<L>  |  1.86<H>    Ca    10.5<H>      29 Dec 2017 10:36    TPro  5.5<L>  /  Alb  3.1<L>  /  TBili  0.6  /  DBili  x   /  AST  19  /  ALT  13  /  AlkPhos  78  12-28      Urinalysis Basic - ( 29 Dec 2017 17:26 )    Color: Yellow / Appearance: Clear / S.015 / pH: x  Gluc: x / Ketone: Negative  / Bili: Negative / Urobili: Negative mg/dL   Blood: x / Protein: 30 mg/dL / Nitrite: Positive   Leuk Esterase: Moderate / RBC: 3-5 /HPF / WBC 11-25   Sq Epi: x / Non Sq Epi: x / Bacteria: Moderate          RADIOLOGY & ADDITIONAL TESTS:

## 2017-12-30 NOTE — PROGRESS NOTE BEHAVIORAL HEALTH - SUMMARY
Pt. is a 66 yo male admitted to [facility] from [facility] found to have left wrist drop and left facial droop.  Pt. has long h/o of Bipolar disorder maintained on Lithium for >25 years which was discontinued in October 2017 2/2 ARF.  Pt. was most recently prescribed Seroquel, Trazodone and Buspar.  Today pt. is verbal, able to answer simple questions but alert and oriented x1 to self only.  Staff denies any behavioral issues or other c/o.
Patient continues to have symptoms of delirium with dementia.  Recommend  restart Seroquel 25 mg bid and 100mg hs  Consider low dose Ativan prn agitation.
68 yo male admitted to [facility] from [facility] found to have left wrist drop and left facial droop.  Pt. has long h/o of Bipolar disorder maintained on Lithium for >25 years which was discontinued in October 2017 2/2 ARF.  Pt. was most recently prescribed Seroquel, Trazodone and Buspar.  Today pt. is verbal, able to answer simple questions but alert and oriented x1 to self only.  St  Patient reported to be intermittently confused by both staff and family, likey has hypoactive delirium.  No acute psychiatric decompensation apparent at this time    would avoid benzodiazepines and anticholinergics  haldol 2mg po/IM q 6 hours PRN agitation  family requests neurology consult to evaluate Parkinson's disease
66 yo male admitted to [facility] from [facility] found to have left wrist drop and left facial droop.  Pt. has long h/o of Bipolar disorder maintained on Lithium for >25 years which was discontinued in October 2017 2/2 ARF and has had intermittent disorientation ever since.  Pt. was most recently prescribed Seroquel, Trazodone and Buspar.  Today pt. is still confused, disoriented but cooperative. Not sleeping well, appetite fair.   Patient reported to be intermittently confused by both staff and family, likely has hypoactive delirium, possibly superimposed on psychosis.    recommend:  1) would avoid benzodiazepines and anticholinergics  2) haldol 2mg po/IM q 6 hours PRN agitation  3) increase seroquel to 25mg bid and additional 50mg HS  4) start depakote 250mg bid  5) psych will follow
Patient a 66 y/o   male, in bed, awake, alert, no acute distress,  with hx o of bipolar D/O, medically has CKD with Hypothyroidism, BPH and Dementia, admitted due to AMS.  Patient is alert and awake now,  and able to answer some questions, not in distress and no aggression or agitation. He is able to follow simple commands, answers slowly in a feeble voice that he is here in the hospital after a prompt, this is , knows his age, and  He denied A/H or paranoid beliefs lives with his wife and worked as an .  As per collateral info from RN, he seems to be much better today, no acute issues, no longer is aggressive or hostile and feels the current meds trial is working for him at this time.    Plan To continue current Psych meds trial as suggested, patient is alert, oriented to year with no aggression/Agitation

## 2017-12-31 LAB
ANION GAP SERPL CALC-SCNC: 13 MMOL/L — SIGNIFICANT CHANGE UP (ref 5–17)
BASOPHILS # BLD AUTO: 0 K/UL — SIGNIFICANT CHANGE UP (ref 0–0.2)
BASOPHILS NFR BLD AUTO: 0.1 % — SIGNIFICANT CHANGE UP (ref 0–2)
BUN SERPL-MCNC: 43 MG/DL — HIGH (ref 8–20)
CALCIUM SERPL-MCNC: 10.2 MG/DL — SIGNIFICANT CHANGE UP (ref 8.6–10.2)
CHLORIDE SERPL-SCNC: 110 MMOL/L — HIGH (ref 98–107)
CO2 SERPL-SCNC: 20 MMOL/L — LOW (ref 22–29)
CREAT SERPL-MCNC: 1.55 MG/DL — HIGH (ref 0.5–1.3)
EOSINOPHIL # BLD AUTO: 0.1 K/UL — SIGNIFICANT CHANGE UP (ref 0–0.5)
EOSINOPHIL NFR BLD AUTO: 0.6 % — SIGNIFICANT CHANGE UP (ref 0–6)
FOLATE SERPL-MCNC: 14.1 NG/ML — SIGNIFICANT CHANGE UP (ref 4–16)
GLUCOSE BLDC GLUCOMTR-MCNC: 123 MG/DL — HIGH (ref 70–99)
GLUCOSE BLDC GLUCOMTR-MCNC: 155 MG/DL — HIGH (ref 70–99)
GLUCOSE BLDC GLUCOMTR-MCNC: 175 MG/DL — HIGH (ref 70–99)
GLUCOSE BLDC GLUCOMTR-MCNC: 195 MG/DL — HIGH (ref 70–99)
GLUCOSE BLDC GLUCOMTR-MCNC: 68 MG/DL — LOW (ref 70–99)
GLUCOSE SERPL-MCNC: 61 MG/DL — LOW (ref 70–115)
HCT VFR BLD CALC: 34.7 % — LOW (ref 42–52)
HGB BLD-MCNC: 11.1 G/DL — LOW (ref 14–18)
LYMPHOCYTES # BLD AUTO: 0.8 K/UL — LOW (ref 1–4.8)
LYMPHOCYTES # BLD AUTO: 7.9 % — LOW (ref 20–55)
MAGNESIUM SERPL-MCNC: 2.3 MG/DL — SIGNIFICANT CHANGE UP (ref 1.6–2.6)
MCHC RBC-ENTMCNC: 28.8 PG — SIGNIFICANT CHANGE UP (ref 27–31)
MCHC RBC-ENTMCNC: 32 G/DL — SIGNIFICANT CHANGE UP (ref 32–36)
MCV RBC AUTO: 90.1 FL — SIGNIFICANT CHANGE UP (ref 80–94)
MONOCYTES # BLD AUTO: 0.7 K/UL — SIGNIFICANT CHANGE UP (ref 0–0.8)
MONOCYTES NFR BLD AUTO: 6.9 % — SIGNIFICANT CHANGE UP (ref 3–10)
NEUTROPHILS # BLD AUTO: 8.7 K/UL — HIGH (ref 1.8–8)
NEUTROPHILS NFR BLD AUTO: 83.7 % — HIGH (ref 37–73)
PHOSPHATE SERPL-MCNC: 3 MG/DL — SIGNIFICANT CHANGE UP (ref 2.4–4.7)
PLATELET # BLD AUTO: 203 K/UL — SIGNIFICANT CHANGE UP (ref 150–400)
POTASSIUM SERPL-MCNC: 3.9 MMOL/L — SIGNIFICANT CHANGE UP (ref 3.5–5.3)
POTASSIUM SERPL-SCNC: 3.9 MMOL/L — SIGNIFICANT CHANGE UP (ref 3.5–5.3)
RBC # BLD: 3.85 M/UL — LOW (ref 4.6–6.2)
RBC # FLD: 15.6 % — SIGNIFICANT CHANGE UP (ref 11–15.6)
SODIUM SERPL-SCNC: 143 MMOL/L — SIGNIFICANT CHANGE UP (ref 135–145)
WBC # BLD: 10.4 K/UL — SIGNIFICANT CHANGE UP (ref 4.8–10.8)
WBC # FLD AUTO: 10.4 K/UL — SIGNIFICANT CHANGE UP (ref 4.8–10.8)

## 2017-12-31 PROCEDURE — 99233 SBSQ HOSP IP/OBS HIGH 50: CPT

## 2017-12-31 RX ORDER — DEXAMETHASONE 0.5 MG/5ML
2 ELIXIR ORAL DAILY
Qty: 0 | Refills: 0 | Status: DISCONTINUED | OUTPATIENT
Start: 2017-12-31 | End: 2018-01-01

## 2017-12-31 RX ADMIN — Medication 50 MILLIGRAM(S): at 14:07

## 2017-12-31 RX ADMIN — Medication 650 MILLIGRAM(S): at 07:16

## 2017-12-31 RX ADMIN — INSULIN GLARGINE 25 UNIT(S): 100 INJECTION, SOLUTION SUBCUTANEOUS at 22:04

## 2017-12-31 RX ADMIN — Medication 2 UNIT(S): at 17:10

## 2017-12-31 RX ADMIN — Medication 2 MILLIGRAM(S): at 11:52

## 2017-12-31 RX ADMIN — Medication 2 UNIT(S): at 07:17

## 2017-12-31 RX ADMIN — Medication 650 MILLIGRAM(S): at 14:07

## 2017-12-31 RX ADMIN — Medication 1: at 22:03

## 2017-12-31 RX ADMIN — Medication 50 MILLIGRAM(S): at 07:17

## 2017-12-31 RX ADMIN — Medication 1: at 17:10

## 2017-12-31 RX ADMIN — ATORVASTATIN CALCIUM 40 MILLIGRAM(S): 80 TABLET, FILM COATED ORAL at 22:05

## 2017-12-31 RX ADMIN — Medication 40 MILLIGRAM(S): at 07:16

## 2017-12-31 RX ADMIN — Medication 1 GRAM(S): at 07:16

## 2017-12-31 RX ADMIN — Medication 1 GRAM(S): at 11:52

## 2017-12-31 RX ADMIN — Medication 2: at 11:50

## 2017-12-31 RX ADMIN — Medication 1 GRAM(S): at 22:05

## 2017-12-31 RX ADMIN — Medication 1 GRAM(S): at 17:12

## 2017-12-31 RX ADMIN — Medication 40 MILLIGRAM(S): at 17:12

## 2017-12-31 RX ADMIN — CINACALCET 30 MILLIGRAM(S): 30 TABLET, FILM COATED ORAL at 07:16

## 2017-12-31 RX ADMIN — Medication 75 MICROGRAM(S): at 07:16

## 2017-12-31 RX ADMIN — Medication 2 MILLIGRAM(S): at 07:16

## 2017-12-31 RX ADMIN — TAMSULOSIN HYDROCHLORIDE 0.4 MILLIGRAM(S): 0.4 CAPSULE ORAL at 22:06

## 2017-12-31 RX ADMIN — PANTOPRAZOLE SODIUM 40 MILLIGRAM(S): 20 TABLET, DELAYED RELEASE ORAL at 07:16

## 2017-12-31 RX ADMIN — Medication 10 MILLIGRAM(S): at 22:03

## 2017-12-31 RX ADMIN — Medication 650 MILLIGRAM(S): at 22:04

## 2017-12-31 RX ADMIN — Medication 2 UNIT(S): at 11:50

## 2017-12-31 RX ADMIN — DIVALPROEX SODIUM 250 MILLIGRAM(S): 500 TABLET, DELAYED RELEASE ORAL at 17:11

## 2017-12-31 RX ADMIN — Medication 50 MILLIGRAM(S): at 22:05

## 2017-12-31 RX ADMIN — CEFTRIAXONE 100 GRAM(S): 500 INJECTION, POWDER, FOR SOLUTION INTRAMUSCULAR; INTRAVENOUS at 14:07

## 2017-12-31 RX ADMIN — CINACALCET 30 MILLIGRAM(S): 30 TABLET, FILM COATED ORAL at 17:11

## 2017-12-31 RX ADMIN — QUETIAPINE FUMARATE 25 MILLIGRAM(S): 200 TABLET, FILM COATED ORAL at 07:16

## 2017-12-31 RX ADMIN — AMLODIPINE BESYLATE 10 MILLIGRAM(S): 2.5 TABLET ORAL at 07:16

## 2017-12-31 RX ADMIN — QUETIAPINE FUMARATE 50 MILLIGRAM(S): 200 TABLET, FILM COATED ORAL at 22:04

## 2017-12-31 RX ADMIN — DIVALPROEX SODIUM 250 MILLIGRAM(S): 500 TABLET, DELAYED RELEASE ORAL at 07:16

## 2017-12-31 RX ADMIN — QUETIAPINE FUMARATE 25 MILLIGRAM(S): 200 TABLET, FILM COATED ORAL at 17:13

## 2017-12-31 NOTE — PROGRESS NOTE ADULT - PROBLEM SELECTOR PLAN 2
Hypoactive delirium  has been intermittently confused at least since Dec 20, requiring haldol/ativan - however no documentation,  Ct head , MRI head - no acute abnormality.  Underlying bipolar disorder  Add seroquel at night  Per daughter pt becomes very somnolent with haldol, refused to receive haldol even prn - understands that he needs placement.    Discussed with neurology - The tremors and lip movements could be 2/2 seroquel and anti-psych meds, will need outpt f/u for evaluation of parkinson's. Hypoactive delirium  has been intermittently confused at least since Dec 20, requiring haldol/ativan - however no documentation,  Ct head , MRI head - no acute abnormality.  Underlying bipolar disorder  Add seroquel at night  Per daughter pt becomes very somnolent with haldol, refused to receive haldol even prn - understands that he needs placement.    Discussed with neurology - The tremors and lip movements (now improved) could be 2/2 seroquel and anti-psych meds, will need outpt f/u for evaluation of parkinson's. Hypoactive delirium  has been intermittently confused at least since Dec 20, requiring haldol/ativan - however no documentation  Ct head , MRI head - no acute abnormality.  Underlying bipolar disorder  Add seroquel at night  Per daughter pt becomes very somnolent with haldol, refused to receive haldol even prn - understands that he needs placement.    Discussed with neurology - The tremors and lip movements (now improved) could be 2/2 seroquel and anti-psych meds, will need outpt f/u for evaluation of parkinson's.

## 2017-12-31 NOTE — PROGRESS NOTE ADULT - PROBLEM SELECTOR PLAN 4
Creatinine 1.45  C/w IVF & Bibarb  Appreciate renal consult Creatinine 1.5  C/w IVF & Bibarb  Appreciate renal consult  Renal US with b/l renal cysts

## 2017-12-31 NOTE — PROGRESS NOTE ADULT - ATTENDING COMMENTS
Hypercalcemia: PTH not suppressed r/o primary HPT  F/U parathyroid US  - added Sensipar 30mg 2 x day  - trend labs      [facility] Hypercalcemia: PTH not suppressed r/o primary HPT  parathyroid US noted, f/u outpatient for further management  - added Sensipar 30mg 2 x day  - trend labs      PT [facility] to [facility] Hypercalcemia: PTH not suppressed r/o primary HPT  parathyroid US noted, f/u outpatient for further management  - added Sensipar 30mg 2 x day  - trend labs      PT recommends back to [facility], when cleared by psych

## 2017-12-31 NOTE — PROGRESS NOTE ADULT - SUBJECTIVE AND OBJECTIVE BOX
NEPHROLOGY INTERVAL HPI/OVERNIGHT EVENTS:  pt clinically unchanged  no acute distress    MEDICATIONS  (STANDING):  amLODIPine   Tablet 10 milliGRAM(s) Oral daily  atorvastatin 40 milliGRAM(s) Oral at bedtime  cefTRIAXone   IVPB      cefTRIAXone   IVPB 1 Gram(s) IV Intermittent every 24 hours  cinacalcet 30 milliGRAM(s) Oral two times a day  dexamethasone     Tablet 2 milliGRAM(s) Oral two times a day  dextrose 5%. 1000 milliLiter(s) (50 mL/Hr) IV Continuous <Continuous>  dextrose 50% Injectable 12.5 Gram(s) IV Push once  dextrose 50% Injectable 25 Gram(s) IV Push once  dextrose 50% Injectable 25 Gram(s) IV Push once  diVALproex  milliGRAM(s) Oral every 12 hours  hydrALAZINE 50 milliGRAM(s) Oral every 8 hours  insulin glargine Injectable (LANTUS) 25 Unit(s) SubCutaneous at bedtime  insulin lispro (HumaLOG) corrective regimen sliding scale   SubCutaneous Before meals and at bedtime  insulin lispro Injectable (HumaLOG) 2 Unit(s) SubCutaneous three times a day before meals  levothyroxine 75 MICROGram(s) Oral daily  pantoprazole    Tablet 40 milliGRAM(s) Oral before breakfast  propranolol 40 milliGRAM(s) Oral two times a day  QUEtiapine 50 milliGRAM(s) Oral at bedtime  QUEtiapine 25 milliGRAM(s) Oral two times a day  sodium bicarbonate 650 milliGRAM(s) Oral three times a day  sucralfate 1 Gram(s) Oral Before meals and at bedtime  tamsulosin 0.4 milliGRAM(s) Oral at bedtime    MEDICATIONS  (PRN):  dextrose Gel 1 Dose(s) Oral once PRN Blood Glucose LESS THAN 70 milliGRAM(s)/deciliter  glucagon  Injectable 1 milliGRAM(s) IntraMuscular once PRN Glucose LESS THAN 70 milligrams/deciliter  hydrALAZINE Injectable 10 milliGRAM(s) IV Push every 8 hours PRN if sbp >170 or dbp >100      Allergies    No Known Allergies          Vital Signs Last 24 Hrs  T(C): 37.2 (31 Dec 2017 07:41), Max: 37.2 (31 Dec 2017 07:41)  T(F): 98.9 (31 Dec 2017 07:41), Max: 98.9 (31 Dec 2017 07:41)  HR: 68 (31 Dec 2017 07:41) (58 - 68)  BP: 148/85 (31 Dec 2017 07:41) (127/67 - 148/85)  BP(mean): --  RR: 18 (31 Dec 2017 07:41) (17 - 20)  SpO2: 98% (31 Dec 2017 07:41) (97% - 100%)    PHYSICAL EXAM:  GENERAL: NAD  EYES:  conjunctiva and sclera clear  NECK: Supple, No JVD/Bruit  NERVOUS SYSTEM:  Awake; slow to respond  CHEST:  CTA , diminished BS at bases  HEART:  RRR, No rub  ABDOMEN: Soft, NT/ND BS+  EXTREMITIES:  No Edema    LABS:                        10.5   9.4   )-----------( 197      ( 30 Dec 2017 09:13 )             32.3         138  |  106  |  46.0<H>  ----------------------------<  143<H>  3.9   |  21.0<L>  |  1.70<H>    Ca    10.0      30 Dec 2017 09:11  Phos  3.4       Mg     2.3         TPro  5.0<L>  /  Alb  2.9<L>  /  TBili  0.4  /  DBili  x   /  AST  10  /  ALT  11  /  AlkPhos  68        Urinalysis Basic - ( 29 Dec 2017 17:26 )    Color: Yellow / Appearance: Clear / S.015 / pH: x  Gluc: x / Ketone: Negative  / Bili: Negative / Urobili: Negative mg/dL   Blood: x / Protein: 30 mg/dL / Nitrite: Positive   Leuk Esterase: Moderate / RBC: 3-5 /HPF / WBC 11-25   Sq Epi: x / Non Sq Epi: x / Bacteria: Moderate      Phosphorus Level, Serum: 3.4 mg/dL ( @ 09:11)  Magnesium, Serum: 2.3 mg/dL ( @ 09:11)      RADIOLOGY & ADDITIONAL TESTS:

## 2017-12-31 NOTE — PROGRESS NOTE ADULT - PROBLEM SELECTOR PLAN 9
Psych consult appreciated  C/w depakote & quetiapine Psych consult appreciated  C/w quetiapine Psych consult appreciated  C/w quetiapine  currently not on cogentin, ?d/mary lou by psych, await call back

## 2017-12-31 NOTE — PROGRESS NOTE ADULT - SUBJECTIVE AND OBJECTIVE BOX
CHIEF COMPLAINT/INTERVAL HISTORY:    Patient is a 67y old  Male who presents with a chief complaint of left wrist and facial droop (26 Dec 2017 00:39)      HPI:  History obtained from chart as patient is currently obtunded status post Ativan for MRI studies. He is a 67 year old male who was sent from [facility] for new onset left wrist drop and left facial droop. Last seen normal at 9 morning.   · Negative Findings: no blurred vision  · Timing: sudden onset  · Duration: hour(s)  4  · Severity: MILD  · Context: unknown  · Witnessed By: nursing home staff  · Aggravating Factors: none  · Relieving Factors: none (20 Dec 2017 22:51)      SUBJECTIVE & OBJECTIVE/ ROS: Pt seen and examined at bedside.  No overnight issues reported. No chest pain, palpitations, sob, light headedness/dizziness, difficulty breathing/cough, fevers/chills, abdominal pain, n/v, diarrhea/constipation, dysuria or increased urinary frequency.     ICU Vital Signs Last 24 Hrs  T(C): 37.2 (31 Dec 2017 07:41), Max: 37.2 (31 Dec 2017 07:41)  T(F): 98.9 (31 Dec 2017 07:41), Max: 98.9 (31 Dec 2017 07:41)  HR: 68 (31 Dec 2017 07:41) (59 - 68)  BP: 148/85 (31 Dec 2017 07:41) (130/68 - 148/85)  BP(mean): --  ABP: --  ABP(mean): --  RR: 18 (31 Dec 2017 07:41) (18 - 20)  SpO2: 98% (31 Dec 2017 07:41) (98% - 100%)        MEDICATIONS  (STANDING):  amLODIPine   Tablet 10 milliGRAM(s) Oral daily  atorvastatin 40 milliGRAM(s) Oral at bedtime  cefTRIAXone   IVPB      cefTRIAXone   IVPB 1 Gram(s) IV Intermittent every 24 hours  cinacalcet 30 milliGRAM(s) Oral two times a day  dexamethasone     Tablet 2 milliGRAM(s) Oral daily  dextrose 5%. 1000 milliLiter(s) (50 mL/Hr) IV Continuous <Continuous>  dextrose 50% Injectable 12.5 Gram(s) IV Push once  dextrose 50% Injectable 25 Gram(s) IV Push once  dextrose 50% Injectable 25 Gram(s) IV Push once  diVALproex  milliGRAM(s) Oral every 12 hours  hydrALAZINE 50 milliGRAM(s) Oral every 8 hours  insulin glargine Injectable (LANTUS) 25 Unit(s) SubCutaneous at bedtime  insulin lispro (HumaLOG) corrective regimen sliding scale   SubCutaneous Before meals and at bedtime  insulin lispro Injectable (HumaLOG) 2 Unit(s) SubCutaneous three times a day before meals  levothyroxine 75 MICROGram(s) Oral daily  pantoprazole    Tablet 40 milliGRAM(s) Oral before breakfast  propranolol 40 milliGRAM(s) Oral two times a day  QUEtiapine 50 milliGRAM(s) Oral at bedtime  QUEtiapine 25 milliGRAM(s) Oral two times a day  sodium bicarbonate 650 milliGRAM(s) Oral three times a day  sucralfate 1 Gram(s) Oral Before meals and at bedtime  tamsulosin 0.4 milliGRAM(s) Oral at bedtime    MEDICATIONS  (PRN):  dextrose Gel 1 Dose(s) Oral once PRN Blood Glucose LESS THAN 70 milliGRAM(s)/deciliter  glucagon  Injectable 1 milliGRAM(s) IntraMuscular once PRN Glucose LESS THAN 70 milligrams/deciliter  hydrALAZINE Injectable 10 milliGRAM(s) IV Push every 8 hours PRN if sbp >170 or dbp >100      LABS:                        10.5   9.4   )-----------( 197      ( 30 Dec 2017 09:13 )             32.3     12-31    143  |  110<H>  |  43.0<H>  ----------------------------<  61<L>  3.9   |  20.0<L>  |  1.55<H>    Ca    10.2      31 Dec 2017 09:19  Phos  3.0     12-  Mg     2.3     -    TPro  5.0<L>  /  Alb  2.9<L>  /  TBili  0.4  /  DBili  x   /  AST  10  /  ALT  11  /  AlkPhos  68  12-30      Urinalysis Basic - ( 29 Dec 2017 17:26 )    Color: Yellow / Appearance: Clear / S.015 / pH: x  Gluc: x / Ketone: Negative  / Bili: Negative / Urobili: Negative mg/dL   Blood: x / Protein: 30 mg/dL / Nitrite: Positive   Leuk Esterase: Moderate / RBC: 3-5 /HPF / WBC 11-25   Sq Epi: x / Non Sq Epi: x / Bacteria: Moderate        CAPILLARY BLOOD GLUCOSE      POCT Blood Glucose.: 123 mg/dL (31 Dec 2017 07:58)  POCT Blood Glucose.: 68 mg/dL (31 Dec 2017 07:19)  POCT Blood Glucose.: 193 mg/dL (30 Dec 2017 21:34)  POCT Blood Glucose.: 219 mg/dL (30 Dec 2017 16:33)  POCT Blood Glucose.: 160 mg/dL (30 Dec 2017 11:31)      RECENT CULTURES:      RADIOLOGY & ADDITIONAL TESTS:      PHYSICAL EXAM:    GENERAL: NAD  HEENT: PERRL, +EOMI  NECK: soft, Supple   CHEST/LUNG: Clear to percussion bilaterally; No wheezing  HEART: S1S2+, Regular rate and rhythm; No murmurs  EXTREMITIES:   No clubbing, cyanosis, or edema  NEURO: AAOX1, confused, blank facial expression,  generalized motor weakness

## 2017-12-31 NOTE — PROGRESS NOTE ADULT - PROBLEM SELECTOR PLAN 1
T2 hyperintensity, Mild stable cord compression at C5-C6  MRI reviewed  Appreciate neurosurgery consult - not a surgical candidate  Disccussed with neurology, will taper dexamethasone 2mg PO bid to qd as per neurology recommendations

## 2018-01-01 DIAGNOSIS — G93.40 ENCEPHALOPATHY, UNSPECIFIED: ICD-10-CM

## 2018-01-01 DIAGNOSIS — S14.105A UNSPECIFIED INJURY AT C5 LEVEL OF CERVICAL SPINAL CORD, INITIAL ENCOUNTER: ICD-10-CM

## 2018-01-01 DIAGNOSIS — G20 PARKINSON'S DISEASE: ICD-10-CM

## 2018-01-01 LAB
-  AMIKACIN: SIGNIFICANT CHANGE UP
-  AMIKACIN: SIGNIFICANT CHANGE UP
-  AMPICILLIN/SULBACTAM: SIGNIFICANT CHANGE UP
-  AMPICILLIN: SIGNIFICANT CHANGE UP
-  AZTREONAM: SIGNIFICANT CHANGE UP
-  AZTREONAM: SIGNIFICANT CHANGE UP
-  CEFAZOLIN: SIGNIFICANT CHANGE UP
-  CEFEPIME: SIGNIFICANT CHANGE UP
-  CEFEPIME: SIGNIFICANT CHANGE UP
-  CEFOXITIN: SIGNIFICANT CHANGE UP
-  CEFTAZIDIME: SIGNIFICANT CHANGE UP
-  CEFTAZIDIME: SIGNIFICANT CHANGE UP
-  CEFTRIAXONE: SIGNIFICANT CHANGE UP
-  CIPROFLOXACIN: SIGNIFICANT CHANGE UP
-  CIPROFLOXACIN: SIGNIFICANT CHANGE UP
-  ERTAPENEM: SIGNIFICANT CHANGE UP
-  GENTAMICIN: SIGNIFICANT CHANGE UP
-  GENTAMICIN: SIGNIFICANT CHANGE UP
-  IMIPENEM: SIGNIFICANT CHANGE UP
-  LEVOFLOXACIN: SIGNIFICANT CHANGE UP
-  LEVOFLOXACIN: SIGNIFICANT CHANGE UP
-  MEROPENEM: SIGNIFICANT CHANGE UP
-  MEROPENEM: SIGNIFICANT CHANGE UP
-  NITROFURANTOIN: SIGNIFICANT CHANGE UP
-  PIPERACILLIN/TAZOBACTAM: SIGNIFICANT CHANGE UP
-  PIPERACILLIN/TAZOBACTAM: SIGNIFICANT CHANGE UP
-  TOBRAMYCIN: SIGNIFICANT CHANGE UP
-  TOBRAMYCIN: SIGNIFICANT CHANGE UP
-  TRIMETHOPRIM/SULFAMETHOXAZOLE: SIGNIFICANT CHANGE UP
ANION GAP SERPL CALC-SCNC: 13 MMOL/L — SIGNIFICANT CHANGE UP (ref 5–17)
BUN SERPL-MCNC: 42 MG/DL — HIGH (ref 8–20)
CALCIUM SERPL-MCNC: 9.6 MG/DL — SIGNIFICANT CHANGE UP (ref 8.6–10.2)
CHLORIDE SERPL-SCNC: 107 MMOL/L — SIGNIFICANT CHANGE UP (ref 98–107)
CO2 SERPL-SCNC: 23 MMOL/L — SIGNIFICANT CHANGE UP (ref 22–29)
CREAT SERPL-MCNC: 1.62 MG/DL — HIGH (ref 0.5–1.3)
CULTURE RESULTS: SIGNIFICANT CHANGE UP
EOSINOPHIL # BLD AUTO: 0.1 K/UL — SIGNIFICANT CHANGE UP (ref 0–0.5)
EOSINOPHIL NFR BLD AUTO: 0.8 % — SIGNIFICANT CHANGE UP (ref 0–6)
GLUCOSE BLDC GLUCOMTR-MCNC: 179 MG/DL — HIGH (ref 70–99)
GLUCOSE BLDC GLUCOMTR-MCNC: 212 MG/DL — HIGH (ref 70–99)
GLUCOSE BLDC GLUCOMTR-MCNC: 258 MG/DL — HIGH (ref 70–99)
GLUCOSE BLDC GLUCOMTR-MCNC: 77 MG/DL — SIGNIFICANT CHANGE UP (ref 70–99)
GLUCOSE SERPL-MCNC: 208 MG/DL — HIGH (ref 70–115)
HCT VFR BLD CALC: 36.5 % — LOW (ref 42–52)
HGB BLD-MCNC: 11.5 G/DL — LOW (ref 14–18)
LYMPHOCYTES # BLD AUTO: 1.1 K/UL — SIGNIFICANT CHANGE UP (ref 1–4.8)
LYMPHOCYTES # BLD AUTO: 9.5 % — LOW (ref 20–55)
MAGNESIUM SERPL-MCNC: 2.1 MG/DL — SIGNIFICANT CHANGE UP (ref 1.6–2.6)
MCHC RBC-ENTMCNC: 28.5 PG — SIGNIFICANT CHANGE UP (ref 27–31)
MCHC RBC-ENTMCNC: 31.5 G/DL — LOW (ref 32–36)
MCV RBC AUTO: 90.6 FL — SIGNIFICANT CHANGE UP (ref 80–94)
METHOD TYPE: SIGNIFICANT CHANGE UP
METHOD TYPE: SIGNIFICANT CHANGE UP
MONOCYTES # BLD AUTO: 1 K/UL — HIGH (ref 0–0.8)
MONOCYTES NFR BLD AUTO: 9 % — SIGNIFICANT CHANGE UP (ref 3–10)
NEUTROPHILS # BLD AUTO: 9 K/UL — HIGH (ref 1.8–8)
NEUTROPHILS NFR BLD AUTO: 79.7 % — HIGH (ref 37–73)
ORGANISM # SPEC MICROSCOPIC CNT: SIGNIFICANT CHANGE UP
PHOSPHATE SERPL-MCNC: 2.8 MG/DL — SIGNIFICANT CHANGE UP (ref 2.4–4.7)
PLATELET # BLD AUTO: 220 K/UL — SIGNIFICANT CHANGE UP (ref 150–400)
POTASSIUM SERPL-MCNC: 4 MMOL/L — SIGNIFICANT CHANGE UP (ref 3.5–5.3)
POTASSIUM SERPL-SCNC: 4 MMOL/L — SIGNIFICANT CHANGE UP (ref 3.5–5.3)
RBC # BLD: 4.03 M/UL — LOW (ref 4.6–6.2)
RBC # FLD: 16 % — HIGH (ref 11–15.6)
SODIUM SERPL-SCNC: 143 MMOL/L — SIGNIFICANT CHANGE UP (ref 135–145)
SPECIMEN SOURCE: SIGNIFICANT CHANGE UP
T PALLIDUM AB TITR SER: NEGATIVE — SIGNIFICANT CHANGE UP
WBC # BLD: 11.3 K/UL — HIGH (ref 4.8–10.8)
WBC # FLD AUTO: 11.3 K/UL — HIGH (ref 4.8–10.8)

## 2018-01-01 PROCEDURE — 99232 SBSQ HOSP IP/OBS MODERATE 35: CPT

## 2018-01-01 PROCEDURE — 99233 SBSQ HOSP IP/OBS HIGH 50: CPT

## 2018-01-01 RX ORDER — DEXAMETHASONE 0.5 MG/5ML
2 ELIXIR ORAL ONCE
Qty: 0 | Refills: 0 | Status: COMPLETED | OUTPATIENT
Start: 2018-01-01 | End: 2018-01-01

## 2018-01-01 RX ADMIN — DIVALPROEX SODIUM 250 MILLIGRAM(S): 500 TABLET, DELAYED RELEASE ORAL at 05:22

## 2018-01-01 RX ADMIN — Medication 1 GRAM(S): at 17:05

## 2018-01-01 RX ADMIN — AMLODIPINE BESYLATE 10 MILLIGRAM(S): 2.5 TABLET ORAL at 05:22

## 2018-01-01 RX ADMIN — INSULIN GLARGINE 25 UNIT(S): 100 INJECTION, SOLUTION SUBCUTANEOUS at 21:48

## 2018-01-01 RX ADMIN — Medication 2 UNIT(S): at 17:05

## 2018-01-01 RX ADMIN — Medication 2 UNIT(S): at 11:32

## 2018-01-01 RX ADMIN — CINACALCET 30 MILLIGRAM(S): 30 TABLET, FILM COATED ORAL at 17:10

## 2018-01-01 RX ADMIN — QUETIAPINE FUMARATE 25 MILLIGRAM(S): 200 TABLET, FILM COATED ORAL at 17:10

## 2018-01-01 RX ADMIN — Medication 1 GRAM(S): at 05:23

## 2018-01-01 RX ADMIN — PANTOPRAZOLE SODIUM 40 MILLIGRAM(S): 20 TABLET, DELAYED RELEASE ORAL at 05:22

## 2018-01-01 RX ADMIN — Medication 1 GRAM(S): at 21:49

## 2018-01-01 RX ADMIN — Medication 650 MILLIGRAM(S): at 05:23

## 2018-01-01 RX ADMIN — Medication 650 MILLIGRAM(S): at 21:49

## 2018-01-01 RX ADMIN — Medication 40 MILLIGRAM(S): at 05:22

## 2018-01-01 RX ADMIN — TAMSULOSIN HYDROCHLORIDE 0.4 MILLIGRAM(S): 0.4 CAPSULE ORAL at 21:49

## 2018-01-01 RX ADMIN — Medication 3: at 21:48

## 2018-01-01 RX ADMIN — Medication 50 MILLIGRAM(S): at 21:48

## 2018-01-01 RX ADMIN — CEFTRIAXONE 100 GRAM(S): 500 INJECTION, POWDER, FOR SOLUTION INTRAMUSCULAR; INTRAVENOUS at 14:07

## 2018-01-01 RX ADMIN — QUETIAPINE FUMARATE 25 MILLIGRAM(S): 200 TABLET, FILM COATED ORAL at 05:23

## 2018-01-01 RX ADMIN — Medication 2 MILLIGRAM(S): at 11:33

## 2018-01-01 RX ADMIN — Medication 40 MILLIGRAM(S): at 17:12

## 2018-01-01 RX ADMIN — CINACALCET 30 MILLIGRAM(S): 30 TABLET, FILM COATED ORAL at 05:22

## 2018-01-01 RX ADMIN — QUETIAPINE FUMARATE 50 MILLIGRAM(S): 200 TABLET, FILM COATED ORAL at 21:48

## 2018-01-01 RX ADMIN — ATORVASTATIN CALCIUM 40 MILLIGRAM(S): 80 TABLET, FILM COATED ORAL at 21:49

## 2018-01-01 RX ADMIN — Medication 1 GRAM(S): at 11:33

## 2018-01-01 RX ADMIN — Medication 75 MICROGRAM(S): at 05:22

## 2018-01-01 RX ADMIN — Medication 2: at 11:32

## 2018-01-01 RX ADMIN — Medication 1: at 17:05

## 2018-01-01 RX ADMIN — Medication 2 MILLIGRAM(S): at 05:22

## 2018-01-01 RX ADMIN — Medication 50 MILLIGRAM(S): at 14:07

## 2018-01-01 RX ADMIN — Medication 50 MILLIGRAM(S): at 05:22

## 2018-01-01 RX ADMIN — Medication 650 MILLIGRAM(S): at 14:07

## 2018-01-01 RX ADMIN — DIVALPROEX SODIUM 250 MILLIGRAM(S): 500 TABLET, DELAYED RELEASE ORAL at 17:09

## 2018-01-01 NOTE — PROGRESS NOTE ADULT - SUBJECTIVE AND OBJECTIVE BOX
NEPHROLOGY INTERVAL HPI/OVERNIGHT EVENTS:    Examined earlier  Looks comfortable    MEDICATIONS  (STANDING):  amLODIPine   Tablet 10 milliGRAM(s) Oral daily  atorvastatin 40 milliGRAM(s) Oral at bedtime  cefTRIAXone   IVPB      cefTRIAXone   IVPB 1 Gram(s) IV Intermittent every 24 hours  cinacalcet 30 milliGRAM(s) Oral two times a day  dexamethasone     Tablet 2 milliGRAM(s) Oral once  dextrose 5%. 1000 milliLiter(s) (50 mL/Hr) IV Continuous <Continuous>  dextrose 50% Injectable 12.5 Gram(s) IV Push once  dextrose 50% Injectable 25 Gram(s) IV Push once  dextrose 50% Injectable 25 Gram(s) IV Push once  diVALproex  milliGRAM(s) Oral every 12 hours  hydrALAZINE 50 milliGRAM(s) Oral every 8 hours  insulin glargine Injectable (LANTUS) 25 Unit(s) SubCutaneous at bedtime  insulin lispro (HumaLOG) corrective regimen sliding scale   SubCutaneous Before meals and at bedtime  insulin lispro Injectable (HumaLOG) 2 Unit(s) SubCutaneous three times a day before meals  levothyroxine 75 MICROGram(s) Oral daily  pantoprazole    Tablet 40 milliGRAM(s) Oral before breakfast  propranolol 40 milliGRAM(s) Oral two times a day  QUEtiapine 50 milliGRAM(s) Oral at bedtime  QUEtiapine 25 milliGRAM(s) Oral two times a day  sodium bicarbonate 650 milliGRAM(s) Oral three times a day  sucralfate 1 Gram(s) Oral Before meals and at bedtime  tamsulosin 0.4 milliGRAM(s) Oral at bedtime    MEDICATIONS  (PRN):  dextrose Gel 1 Dose(s) Oral once PRN Blood Glucose LESS THAN 70 milliGRAM(s)/deciliter  glucagon  Injectable 1 milliGRAM(s) IntraMuscular once PRN Glucose LESS THAN 70 milligrams/deciliter  hydrALAZINE Injectable 10 milliGRAM(s) IV Push every 8 hours PRN if sbp >170 or dbp >100      Allergies    No Known Allergies    Intolerances        Vital Signs Last 24 Hrs  T(C): 36.7 (31 Dec 2017 21:57), Max: 37 (31 Dec 2017 16:39)  T(F): 98 (31 Dec 2017 21:57), Max: 98.6 (31 Dec 2017 16:39)  HR: 50 (01 Jan 2018 08:10) (50 - 71)  BP: 140/79 (01 Jan 2018 08:10) (138/80 - 179/88)  BP(mean): --  RR: 18 (01 Jan 2018 08:10) (16 - 18)  SpO2: 96% (01 Jan 2018 08:10) (96% - 99%)  Daily     Daily     PHYSICAL EXAM:  GENERAL: NAD  EYES:  conjunctiva and sclera clear  NECK: Supple, No JVD/Bruit  NERVOUS SYSTEM:  Awake; slow to respond  CHEST:  CTA , diminished BS at bases  HEART:  RRR, No rub  ABDOMEN: Soft, NT/ND BS+  EXTREMITIES:  No Edema    LABS:                        11.5   11.3  )-----------( 220      ( 01 Jan 2018 08:56 )             36.5     01-01    143  |  107  |  42.0<H>  ----------------------------<  208<H>  4.0   |  23.0  |  1.62<H>    Ca    9.6      01 Jan 2018 08:56  Phos  2.8     01-01  Mg     2.1     01-01          Magnesium, Serum: 2.1 mg/dL (01-01 @ 08:56)  Phosphorus Level, Serum: 2.8 mg/dL (01-01 @ 08:56)          RADIOLOGY & ADDITIONAL TESTS:

## 2018-01-01 NOTE — PROGRESS NOTE ADULT - PROBLEM SELECTOR PROBLEM 6
Acquired hypothyroidism
Bipolar 1 disorder
Bipolar 1 disorder
Urinary tract infection with hematuria, site unspecified

## 2018-01-01 NOTE — PROGRESS NOTE ADULT - SUBJECTIVE AND OBJECTIVE BOX
MASOUD ELLIS    [id number]    67y      Male    INTERVAL HPI/OVERNIGHT EVENTS:   no new symptoms reported.     Vital Signs Last 24 Hrs  T(C): 36.7 (31 Dec 2017 21:57), Max: 37.2 (31 Dec 2017 07:41)  T(F): 98 (31 Dec 2017 21:57), Max: 98.9 (31 Dec 2017 07:41)  HR: 59 (01 Jan 2018 05:19) (57 - 71)  BP: 152/71 (01 Jan 2018 05:19) (138/80 - 179/88)  BP(mean): --  RR: 16 (31 Dec 2017 21:57) (16 - 18)  SpO2: 99% (31 Dec 2017 21:57) (98% - 99%)    PHYSICAL EXAM:    GENERAL: no acute distress. dressed appropriate for hospitalization.  HEENT: normocephalic  NEURO: awake, alert and interactive. speech slow low volume, fluent. no dysarthria. follows commands pupils equal. EOMI. Visual fields full to movement. facial strength symmetric. motor strength RUE 5-/5. LUE prox 4+/5 and wrist ext 4/5. LE 4-/5 varied effort with dorsiflex 3/5. plantar response neutral bilat. mild rigidity bilateral UE  PSYCH: mood appropriate, cooperative.     LABS:                        11.1   10.4  )-----------( 203      ( 31 Dec 2017 11:25 )             34.7     12-31    143  |  110<H>  |  43.0<H>  ----------------------------<  61<L>  3.9   |  20.0<L>  |  1.55<H>    Ca    10.2      31 Dec 2017 09:19  Phos  3.0     12-31  Mg     2.3     12-31    TPro  5.0<L>  /  Alb  2.9<L>  /  TBili  0.4  /  DBili  x   /  AST  10  /  ALT  11  /  AlkPhos  68  12-30    MEDICATIONS  (STANDING):  amLODIPine   Tablet 10 milliGRAM(s) Oral daily  atorvastatin 40 milliGRAM(s) Oral at bedtime  cefTRIAXone   IVPB      cefTRIAXone   IVPB 1 Gram(s) IV Intermittent every 24 hours  cinacalcet 30 milliGRAM(s) Oral two times a day  dexamethasone     Tablet 2 milliGRAM(s) Oral daily  dextrose 5%. 1000 milliLiter(s) (50 mL/Hr) IV Continuous <Continuous>  dextrose 50% Injectable 12.5 Gram(s) IV Push once  dextrose 50% Injectable 25 Gram(s) IV Push once  dextrose 50% Injectable 25 Gram(s) IV Push once  diVALproex  milliGRAM(s) Oral every 12 hours  hydrALAZINE 50 milliGRAM(s) Oral every 8 hours  insulin glargine Injectable (LANTUS) 25 Unit(s) SubCutaneous at bedtime  insulin lispro (HumaLOG) corrective regimen sliding scale   SubCutaneous Before meals and at bedtime  insulin lispro Injectable (HumaLOG) 2 Unit(s) SubCutaneous three times a day before meals  levothyroxine 75 MICROGram(s) Oral daily  pantoprazole    Tablet 40 milliGRAM(s) Oral before breakfast  propranolol 40 milliGRAM(s) Oral two times a day  QUEtiapine 50 milliGRAM(s) Oral at bedtime  QUEtiapine 25 milliGRAM(s) Oral two times a day  sodium bicarbonate 650 milliGRAM(s) Oral three times a day  sucralfate 1 Gram(s) Oral Before meals and at bedtime  tamsulosin 0.4 milliGRAM(s) Oral at bedtime    MEDICATIONS  (PRN):  dextrose Gel 1 Dose(s) Oral once PRN Blood Glucose LESS THAN 70 milliGRAM(s)/deciliter  glucagon  Injectable 1 milliGRAM(s) IntraMuscular once PRN Glucose LESS THAN 70 milligrams/deciliter  hydrALAZINE Injectable 10 milliGRAM(s) IV Push every 8 hours PRN if sbp >170 or dbp >100      RADIOLOGY & ADDITIONAL TESTS:

## 2018-01-01 NOTE — PROGRESS NOTE ADULT - ATTENDING COMMENTS
Hypercalcemia: PTH not suppressed r/o primary HPT  parathyroid US noted, f/u outpatient for further management  - added Sensipar 30mg 2 x day  - trend labs      PT [facility] to [facility], will d/c pt in am, discussed with daughter Naz  in details

## 2018-01-01 NOTE — PROGRESS NOTE ADULT - PROBLEM SELECTOR PLAN 9
Psych consult appreciated  C/w quetiapine & depakote  cogentin d/mary lou by psych  Cleared by psych for discharge

## 2018-01-01 NOTE — PROGRESS NOTE ADULT - SUBJECTIVE AND OBJECTIVE BOX
CHIEF COMPLAINT/INTERVAL HISTORY:    Patient is a 67y old  Male who presents with a chief complaint of left wrist and facial droop (26 Dec 2017 00:39)      HPI:  History obtained from chart as patient is currently obtunded status post Ativan for MRI studies. He is a 67 year old male who was sent from [facility] for new onset left wrist drop and left facial droop. Last seen normal at 9 morning.   · Negative Findings: no blurred vision  · Timing: sudden onset  · Duration: hour(s)  4  · Severity: MILD  · Context: unknown  · Witnessed By: nursing home staff  · Aggravating Factors: none  · Relieving Factors: none (20 Dec 2017 22:51)      SUBJECTIVE & OBJECTIVE/ ROS: Pt seen and examined at bedside. Pt doing very well. AAO x 3 currently. Yet with generalized weakness but voices no other complaints at this time.     No chest pain, palpitations, sob, light headedness/dizziness, difficulty breathing/cough, fevers/chills, abdominal pain, n/v, diarrhea/constipation, dysuria or increased urinary frequency.     ICU Vital Signs Last 24 Hrs  T(C): 36.7 (31 Dec 2017 21:57), Max: 37 (31 Dec 2017 16:39)  T(F): 98 (31 Dec 2017 21:57), Max: 98.6 (31 Dec 2017 16:39)  HR: 50 (01 Jan 2018 08:10) (50 - 71)  BP: 140/79 (01 Jan 2018 08:10) (138/80 - 179/88)  BP(mean): --  ABP: --  ABP(mean): --  RR: 18 (01 Jan 2018 08:10) (16 - 18)  SpO2: 96% (01 Jan 2018 08:10) (96% - 99%)        MEDICATIONS  (STANDING):  amLODIPine   Tablet 10 milliGRAM(s) Oral daily  atorvastatin 40 milliGRAM(s) Oral at bedtime  cefTRIAXone   IVPB      cefTRIAXone   IVPB 1 Gram(s) IV Intermittent every 24 hours  cinacalcet 30 milliGRAM(s) Oral two times a day  dexamethasone     Tablet 2 milliGRAM(s) Oral once  dextrose 5%. 1000 milliLiter(s) (50 mL/Hr) IV Continuous <Continuous>  dextrose 50% Injectable 12.5 Gram(s) IV Push once  dextrose 50% Injectable 25 Gram(s) IV Push once  dextrose 50% Injectable 25 Gram(s) IV Push once  diVALproex  milliGRAM(s) Oral every 12 hours  hydrALAZINE 50 milliGRAM(s) Oral every 8 hours  insulin glargine Injectable (LANTUS) 25 Unit(s) SubCutaneous at bedtime  insulin lispro (HumaLOG) corrective regimen sliding scale   SubCutaneous Before meals and at bedtime  insulin lispro Injectable (HumaLOG) 2 Unit(s) SubCutaneous three times a day before meals  levothyroxine 75 MICROGram(s) Oral daily  pantoprazole    Tablet 40 milliGRAM(s) Oral before breakfast  propranolol 40 milliGRAM(s) Oral two times a day  QUEtiapine 50 milliGRAM(s) Oral at bedtime  QUEtiapine 25 milliGRAM(s) Oral two times a day  sodium bicarbonate 650 milliGRAM(s) Oral three times a day  sucralfate 1 Gram(s) Oral Before meals and at bedtime  tamsulosin 0.4 milliGRAM(s) Oral at bedtime    MEDICATIONS  (PRN):  dextrose Gel 1 Dose(s) Oral once PRN Blood Glucose LESS THAN 70 milliGRAM(s)/deciliter  glucagon  Injectable 1 milliGRAM(s) IntraMuscular once PRN Glucose LESS THAN 70 milligrams/deciliter  hydrALAZINE Injectable 10 milliGRAM(s) IV Push every 8 hours PRN if sbp >170 or dbp >100      LABS:                        11.5   11.3  )-----------( 220      ( 01 Jan 2018 08:56 )             36.5     01-01    143  |  107  |  42.0<H>  ----------------------------<  208<H>  4.0   |  23.0  |  1.62<H>    Ca    9.6      01 Jan 2018 08:56  Phos  2.8     01-01  Mg     2.1     01-01            CAPILLARY BLOOD GLUCOSE      POCT Blood Glucose.: 77 mg/dL (01 Jan 2018 07:32)  POCT Blood Glucose.: 195 mg/dL (31 Dec 2017 21:52)  POCT Blood Glucose.: 155 mg/dL (31 Dec 2017 16:35)  POCT Blood Glucose.: 175 mg/dL (31 Dec 2017 11:49)      RECENT CULTURES:      RADIOLOGY & ADDITIONAL TESTS:      PHYSICAL EXAM:    GENERAL: NAD  HEENT: PERRL, +EOMI  NECK: soft, Supple   CHEST/LUNG: Clear to percussion bilaterally; No wheezing  HEART: S1S2+, Regular rate and rhythm; No murmurs  EXTREMITIES:   No clubbing, cyanosis, or edema  NEURO: AAOX3, slow to answer & move, blank facial expression,  generalized motor weakness

## 2018-01-01 NOTE — PROGRESS NOTE BEHAVIORAL HEALTH - NSBHCONSFOLLOWNEEDS_PSY_A_CORE
Patient needs further psychiatric safety assessment prior to discharge
no psychiatric contraindications to discharge
Patient needs further psychiatric safety assessment prior to discharge

## 2018-01-01 NOTE — PROGRESS NOTE ADULT - PROBLEM SELECTOR PLAN 7
Likely with steroid induced hyperglycemia  ct lantus and humalog  C/w lantus and sliding scale
Likely with steroid induced hyperglycemia  ct lantus and humalog  C/w lantus and sliding scale
Psych consult appreciated  C/w quietiapine and ativan PRN
Psych consult appreciated  C/w quetiapine and haldol PRN
Likely with steroid induced hyperglycemia  ct lantus and humalog  C/w lantus and sliding scale
Likely with steroid induced hyperglycemia  ct lantus and humalog  C/w lantus and sliding scale
Psych consult appreciated  C/w quetiapine and ativan PRN
Psych consult appreciated  C/w quetiapine and ativan PRN
Psych consult appreciated  C/w quietiapine and ativan PRN
Psych consult appreciated  C/w quietiapine and ativan PRN
abnormality seen of MRI of spine- may be an old cervical cord lesion- neurosurgery consulted  Decadron started empirically
abnormality seen of MRI of spine- may be an old cervical cord lesion- neurosurgery consulted  Decadron started empirically  MR with agdo if RF improves  neurologically better today

## 2018-01-01 NOTE — PROGRESS NOTE ADULT - PROBLEM SELECTOR PROBLEM 2
Parkinsonism
CKD (chronic kidney disease) stage 3, GFR 30-59 ml/min
CKD (chronic kidney disease) stage 3, GFR 30-59 ml/min
Cerebrovascular accident (CVA), unspecified mechanism
Cerebrovascular accident (CVA), unspecified mechanism
Delirium due to another medical condition
Delirium due to another medical condition
Lesion of cervical nerve root
Lesion of cervical nerve root
Delirium due to another medical condition
Lesion of cervical nerve root
CKD (chronic kidney disease) stage 3, GFR 30-59 ml/min
Delirium due to another medical condition

## 2018-01-01 NOTE — PROGRESS NOTE BEHAVIORAL HEALTH - NS ED BHA MSE GENERAL APPEARANCE
Well developed/No deformities present
No deformities present
No deformities present/Well developed
Well developed/No deformities present
Well developed/No deformities present
No deformities present/Well developed

## 2018-01-01 NOTE — PROGRESS NOTE ADULT - PROBLEM SELECTOR PROBLEM 3
Spinal cord injury at C5-C7 level without injury of spinal bone
Hypertensive encephalopathy
Hypertensive encephalopathy
Leukocytosis, unspecified type
Leukocytosis, unspecified type
Urinary retention
Urinary retention
Urinary tract infection with hematuria, site unspecified
Urinary tract infection with hematuria, site unspecified
Leukocytosis, unspecified type
Leukocytosis, unspecified type
Urinary tract infection with hematuria, site unspecified
Hypertensive encephalopathy

## 2018-01-01 NOTE — PROGRESS NOTE BEHAVIORAL HEALTH - NSBHFUPINTERVALCCFT_PSY_A_CORE
" I still don't feel good "
"medical problems"
F/U for delirium superimposed on dementia
Patient stated, " I am fine."
Psych F/U for clearence
n/a

## 2018-01-01 NOTE — PROGRESS NOTE BEHAVIORAL HEALTH - NSBHPTASSESSDT_PSY_A_CORE
01-Jan-2018 10:24
22-Dec-2017 13:19
23-Dec-2017 19:15
27-Dec-2017 12:45
30-Dec-2017 12:18
29-Dec-2017 16:26

## 2018-01-01 NOTE — PROGRESS NOTE BEHAVIORAL HEALTH - NSBHATTESTSEENBY_PSY_A_CORE
NP without Attending Psychiatrist
NP with telephonic supervision from Attending Psychiatrist
attending Psychiatrist without NP/Trainee
NP without Attending Psychiatrist
attending Psychiatrist without NP/Trainee
attending Psychiatrist without NP/Trainee

## 2018-01-01 NOTE — PROGRESS NOTE BEHAVIORAL HEALTH - NSBHCHARTREVIEWVS_PSY_A_CORE FT
CBC-- Increased WBC  Others are WNL.
Vital Signs Last 24 Hrs  T(C): 36.4 (27 Dec 2017 08:27), Max: 37.2 (27 Dec 2017 03:38)  T(F): 97.6 (27 Dec 2017 08:27), Max: 98.9 (27 Dec 2017 03:38)  HR: 98 (27 Dec 2017 08:27) (70 - 101)  BP: 165/84 (27 Dec 2017 08:27) (153/95 - 201/100)  BP(mean): 117 (27 Dec 2017 02:42) (117 - 117)  RR: 18 (27 Dec 2017 08:27) (18 - 22)  SpO2: 97% (27 Dec 2017 08:27) (96% - 97%)
WNL

## 2018-01-01 NOTE — PROGRESS NOTE BEHAVIORAL HEALTH - NSBHCONSULTFOLLOWAFTERCARE_PSY_A_CORE FT
Continue with current provider
return to [facility], follow up by psychiatry there
psych will follow while inpatient
To F/U with Psych as per [facility]

## 2018-01-01 NOTE — PROGRESS NOTE ADULT - PROBLEM SELECTOR PROBLEM 7
Diabetes mellitus of other type with microalbuminuria, with long-term current use of insulin
Diabetes mellitus of other type with microalbuminuria, with long-term current use of insulin
Bipolar 1 disorder
Diabetes mellitus of other type with microalbuminuria, with long-term current use of insulin
Diabetes mellitus of other type with microalbuminuria, with long-term current use of insulin
Lesion of cervical nerve root
Lesion of cervical nerve root

## 2018-01-01 NOTE — PROGRESS NOTE ADULT - PROBLEM SELECTOR PROBLEM 5
Diabetes mellitus of other type with microalbuminuria, with long-term current use of insulin
Hypertensive encephalopathy

## 2018-01-01 NOTE — PROGRESS NOTE ADULT - PROBLEM SELECTOR PLAN 2
Hypoactive delirium  has been intermittently confused at least since Dec 20, requiring haldol/ativan - however no documentation  Ct head , MRI head - no acute abnormality.  Underlying bipolar disorder  Add seroquel at night  Per daughter pt becomes very somnolent with haldol, refused to receive haldol even prn - understands that he needs placement.    Discussed with neurology - The tremors and lip movements (now improved) could be 2/2 seroquel and anti-psych meds, will need outpt f/u for evaluation of parkinson's.

## 2018-01-01 NOTE — PROGRESS NOTE ADULT - PROBLEM SELECTOR PLAN 1
T2 hyperintensity, Mild stable cord compression at C5-C6  MRI reviewed  Appreciate neurosurgery consult - not a surgical candidate  Disccussed with neurology, will taper off dexamethasone as per neurology recommendations

## 2018-01-01 NOTE — PROGRESS NOTE ADULT - NSHPATTENDINGPLANDISCUSS_GEN_ALL_CORE
Daughter Naz over phone, RN
RN
Daughter Naz [place]
patient
patient
Daughter Naz over phone, RN

## 2018-01-01 NOTE — PROGRESS NOTE BEHAVIORAL HEALTH - NSBHFUPINTERVALHXFT_PSY_A_CORE
Alert and oriented x1. Observed attempting to climb OOB. No rigidity noted.  Cognition waxing and waning today with impaired awareness of surroundings and memory. Does not appear to be responding to internal stimuli.
Patient a 66 y/o male with hx of Dementia, with superimposed Delirium in the context of discontinuation of Lithium for Bipolar D/O. Patient was delirious earlier, was in posey restraints for agitation. Meds were changed recently and is currently on Seroquel 25 mg BID with Seroquel 50 mg HS, and also on Depakote 250 mg BID, seems to be responding well to the current meds, alert, oriented to year, feeble voice , hard to hear, no aggression or agitation noted currently able to comprehend and answer accordingly, with good sleep.
Patient a 68 y/o   male, in bed, awake, alert, no acute distress,  with hx o of bipolar D/O, medically has CKD with Hypothyroidism, BPH and Dementia, admitted due to AMS.  Patient is alert and awake now,  and able to answer some questions, not in distress and no aggression or agitation. He is able to follow simple commands, answers slowly in a feeble voice that he is here in the hospital after a prompt, this is , knows his age, and  He denied A/H or paranoid beliefs lives with his wife and worked as an .  As per collateral info from RN, he seems to be much better today, no acute issues, no longer is aggressive or hostile and feels the current meds trial is working for him at this time.    Plan To continue current Psych meds trial as suggested, patient is alert, oriented to year with no aggression/Agitation
Patient reports good mood, states he is here for medical reasons however cannot further elaborate. He endorses AH of voices which scare him, but cannot further elaborate, reports voices are intermittent, hears them daily, denies commands to hurt himself or others. He reports VH however does not respond when asked to describe. He denies paranoia, reports sleeping well. He denies suicidal and homicidal ideation and denies depression.   RN reports patient is intermittently confused, earlier thought he was in an office , now during eval patient knows he is at [facility]. Rn states patient also reported earlier that he cut his penis off, patient continues to believe this however does not appear distressed about this.  Staff states patient intermittently restless and tries to pull out IV , now is placed in Posey for this reason, has not been aggressive.  Spoke to daughter and daughter’s  who states that patient was completely “coherent” until October of this year , since then has been intermittently disoriented during visits to Nursing home, daughter feels patient’s psychiatric medications need to be changed for this reason.  Daughter states Parkinson’s disease has been questioned for a long time however no one has given them an answer regarding whether he has it or not.
Pt. is a 66 yo male admitted to [facility] from [facility] for change in mental status accompanied by left wrist drop and left facial droop.  PPH includes long hx of Bipolar disorder, 2 prior inpatient psychiatric hospitalizations, one prior suicide attempt by overdose.  .  Pt. previously on Lithium for over 25 years.  [place] discontinued at [facility] in October.  At [facility] since November followed by Dr. Valerio.  Pt. currently prescribed Seroquel and Cogentin.  PMH HTN, utinary retention, renal impairment, TURP and chronic indwelling gama catheter.
Patient reports mood "ok", affect flat, staring at practitioner; exhibits latent responses, some which are appropriate others not. A&Ox self only, denies si/hi/avh at this time. He appears confused. When given multiple choice question, was able to identify that we were in a hospital. Not able to follow commands or concentrate well.   Staff states patient intermittently restless and tries to pull out IV , now is placed in Posey for this reason, has not been aggressive. He rests at night but doesn't appear to be sleeping.   Spoke to daughter who states that patient was completely “coherent” until October of this year when [facility] stopped lithium due to KOFI.  Daughter states Parkinson’s disease has been questioned for a long time however no one has given them an answer regarding whether he has it or not.

## 2018-01-01 NOTE — PROGRESS NOTE ADULT - PROBLEM SELECTOR PLAN 6
completed  zosyn 14days  Ucx growing GNR, pt has a chronic Hardin which was changed in the ER, afebrile, f/u Ucx, Blood cx, complete rocephin 3/5 days, will consider ppx antibiotics for recurrent UTI, pt needs to follow up with  as outpatient, discussed with daughter

## 2018-01-01 NOTE — PROGRESS NOTE BEHAVIORAL HEALTH - NSBHCONSULTOBS_PSY_A_CORE
Constant observation
Constant observation
Routine observation

## 2018-01-01 NOTE — PROGRESS NOTE ADULT - PROBLEM SELECTOR PROBLEM 4
Urinary tract infection with hematuria, site unspecified
CKD (chronic kidney disease) stage 3, GFR 30-59 ml/min
Urinary tract infection with hematuria, site unspecified
CKD (chronic kidney disease) stage 3, GFR 30-59 ml/min

## 2018-01-01 NOTE — PROGRESS NOTE BEHAVIORAL HEALTH - PERCEPTIONS
Auditory hallucinations/No abnormalities
Auditory hallucinations
Auditory hallucinations
Auditory hallucinations/No abnormalities

## 2018-01-01 NOTE — PROGRESS NOTE ADULT - PROBLEM SELECTOR PROBLEM 1
Encephalopathy
Hypertensive encephalopathy
Lesion of cervical nerve root
Hypertensive encephalopathy
Lesion of cervical nerve root

## 2018-01-01 NOTE — PROGRESS NOTE ADULT - PROBLEM SELECTOR PLAN 3
needs to be mobilized with PT/OT eval.   defer medications for parkinsonism  continue psych medication.  medical management  defer medication for cognitive impairment.
Has been on zosyn since Dec. 14.   Pt without signs or symptoms of systemic infection at this time.   D/c zosyn and monitor off abx
Has been on zosyn since Dec. 14.   Pt without signs or symptoms of systemic infection at this time.   D/c zosyn and monitor off abx
Hypertensive overnight  Increase propanolol   C/w amlodipine
Resolved  BP better managed  C/w amlodipine and propnalol
hx of BPH, continue with chronic gama
hx of BPH, continue with chronic gama
repeat cbc  ?2/2 steroids  no clinical signs of overt infection at present , recently treated with 14days zosyn for UTI.
repeat cbc  ?2/2 steroids vs UTI   recently treated with 14days zosyn for UTI  Ucx growing GNR, pt has a chronic Hardin which was changed in the ER, afebrile, f/u Ucx, Blood cx
repeat cbc  ?2/2 steroids vs UTI   recently treated with 14days zosyn for UTI  Ucx growing GNR, pt has a chronic Hardin which was changed in the ER, afebrile, f/u Ucx, Blood cx
Has been on zosyn since Dec. 14.   Pt without signs or symptoms of systemic infection at this time.   D/c zosyn and monitor off abx
repeat cbc  ?2/2 steroids vs UTI   recently treated with 14days zosyn for UTI  Ucx growing GNR, pt has a chronic Hardin which was changed in the ER, afebrile, f/u Ucx, Blood cx
Hypertensive overnight  add hydralazine po with hold parameters  C/w amlodipine

## 2018-01-01 NOTE — PROGRESS NOTE BEHAVIORAL HEALTH - PRIMARY DX
Delirium due to another medical condition
Bipolar 1 disorder

## 2018-01-02 VITALS
SYSTOLIC BLOOD PRESSURE: 156 MMHG | HEART RATE: 68 BPM | OXYGEN SATURATION: 97 % | RESPIRATION RATE: 18 BRPM | DIASTOLIC BLOOD PRESSURE: 72 MMHG

## 2018-01-02 LAB
ANION GAP SERPL CALC-SCNC: 10 MMOL/L — SIGNIFICANT CHANGE UP (ref 5–17)
BASOPHILS # BLD AUTO: 0 K/UL — SIGNIFICANT CHANGE UP (ref 0–0.2)
BASOPHILS NFR BLD AUTO: 0.1 % — SIGNIFICANT CHANGE UP (ref 0–2)
BUN SERPL-MCNC: 45 MG/DL — HIGH (ref 8–20)
CALCIUM SERPL-MCNC: 9.5 MG/DL — SIGNIFICANT CHANGE UP (ref 8.6–10.2)
CHLORIDE SERPL-SCNC: 111 MMOL/L — HIGH (ref 98–107)
CO2 SERPL-SCNC: 28 MMOL/L — SIGNIFICANT CHANGE UP (ref 22–29)
CREAT SERPL-MCNC: 1.62 MG/DL — HIGH (ref 0.5–1.3)
EOSINOPHIL # BLD AUTO: 0.1 K/UL — SIGNIFICANT CHANGE UP (ref 0–0.5)
EOSINOPHIL NFR BLD AUTO: 1.2 % — SIGNIFICANT CHANGE UP (ref 0–6)
GLUCOSE BLDC GLUCOMTR-MCNC: 158 MG/DL — HIGH (ref 70–99)
GLUCOSE BLDC GLUCOMTR-MCNC: 173 MG/DL — HIGH (ref 70–99)
GLUCOSE BLDC GLUCOMTR-MCNC: 73 MG/DL — SIGNIFICANT CHANGE UP (ref 70–99)
GLUCOSE SERPL-MCNC: 72 MG/DL — SIGNIFICANT CHANGE UP (ref 70–115)
HCT VFR BLD CALC: 35.5 % — LOW (ref 42–52)
HGB BLD-MCNC: 11.2 G/DL — LOW (ref 14–18)
LYMPHOCYTES # BLD AUTO: 1.9 K/UL — SIGNIFICANT CHANGE UP (ref 1–4.8)
LYMPHOCYTES # BLD AUTO: 17.5 % — LOW (ref 20–55)
MAGNESIUM SERPL-MCNC: 2.1 MG/DL — SIGNIFICANT CHANGE UP (ref 1.6–2.6)
MCHC RBC-ENTMCNC: 28.9 PG — SIGNIFICANT CHANGE UP (ref 27–31)
MCHC RBC-ENTMCNC: 31.5 G/DL — LOW (ref 32–36)
MCV RBC AUTO: 91.7 FL — SIGNIFICANT CHANGE UP (ref 80–94)
MONOCYTES # BLD AUTO: 1.1 K/UL — HIGH (ref 0–0.8)
MONOCYTES NFR BLD AUTO: 10.7 % — HIGH (ref 3–10)
NEUTROPHILS # BLD AUTO: 7.3 K/UL — SIGNIFICANT CHANGE UP (ref 1.8–8)
NEUTROPHILS NFR BLD AUTO: 69.3 % — SIGNIFICANT CHANGE UP (ref 37–73)
PHOSPHATE SERPL-MCNC: 3 MG/DL — SIGNIFICANT CHANGE UP (ref 2.4–4.7)
PLATELET # BLD AUTO: 193 K/UL — SIGNIFICANT CHANGE UP (ref 150–400)
POTASSIUM SERPL-MCNC: 3.8 MMOL/L — SIGNIFICANT CHANGE UP (ref 3.5–5.3)
POTASSIUM SERPL-SCNC: 3.8 MMOL/L — SIGNIFICANT CHANGE UP (ref 3.5–5.3)
RBC # BLD: 3.87 M/UL — LOW (ref 4.6–6.2)
RBC # FLD: 15.9 % — HIGH (ref 11–15.6)
SODIUM SERPL-SCNC: 149 MMOL/L — HIGH (ref 135–145)
VALPROATE SERPL-MCNC: 21.4 UG/ML — LOW (ref 50–100)
WBC # BLD: 10.6 K/UL — SIGNIFICANT CHANGE UP (ref 4.8–10.8)
WBC # FLD AUTO: 10.6 K/UL — SIGNIFICANT CHANGE UP (ref 4.8–10.8)

## 2018-01-02 PROCEDURE — 83970 ASSAY OF PARATHORMONE: CPT

## 2018-01-02 PROCEDURE — 86900 BLOOD TYPING SEROLOGIC ABO: CPT

## 2018-01-02 PROCEDURE — 80164 ASSAY DIPROPYLACETIC ACD TOT: CPT

## 2018-01-02 PROCEDURE — 87086 URINE CULTURE/COLONY COUNT: CPT

## 2018-01-02 PROCEDURE — 70450 CT HEAD/BRAIN W/O DYE: CPT

## 2018-01-02 PROCEDURE — 70551 MRI BRAIN STEM W/O DYE: CPT

## 2018-01-02 PROCEDURE — 76536 US EXAM OF HEAD AND NECK: CPT

## 2018-01-02 PROCEDURE — 85730 THROMBOPLASTIN TIME PARTIAL: CPT

## 2018-01-02 PROCEDURE — 76775 US EXAM ABDO BACK WALL LIM: CPT

## 2018-01-02 PROCEDURE — 96374 THER/PROPH/DIAG INJ IV PUSH: CPT

## 2018-01-02 PROCEDURE — 84484 ASSAY OF TROPONIN QUANT: CPT

## 2018-01-02 PROCEDURE — 92610 EVALUATE SWALLOWING FUNCTION: CPT

## 2018-01-02 PROCEDURE — 70544 MR ANGIOGRAPHY HEAD W/O DYE: CPT

## 2018-01-02 PROCEDURE — 85610 PROTHROMBIN TIME: CPT

## 2018-01-02 PROCEDURE — 82607 VITAMIN B-12: CPT

## 2018-01-02 PROCEDURE — 71045 X-RAY EXAM CHEST 1 VIEW: CPT

## 2018-01-02 PROCEDURE — 84100 ASSAY OF PHOSPHORUS: CPT

## 2018-01-02 PROCEDURE — 82270 OCCULT BLOOD FECES: CPT

## 2018-01-02 PROCEDURE — 96375 TX/PRO/DX INJ NEW DRUG ADDON: CPT

## 2018-01-02 PROCEDURE — 70547 MR ANGIOGRAPHY NECK W/O DYE: CPT

## 2018-01-02 PROCEDURE — 82310 ASSAY OF CALCIUM: CPT

## 2018-01-02 PROCEDURE — 97163 PT EVAL HIGH COMPLEX 45 MIN: CPT

## 2018-01-02 PROCEDURE — 97110 THERAPEUTIC EXERCISES: CPT

## 2018-01-02 PROCEDURE — 99291 CRITICAL CARE FIRST HOUR: CPT | Mod: 25

## 2018-01-02 PROCEDURE — 82746 ASSAY OF FOLIC ACID SERUM: CPT

## 2018-01-02 PROCEDURE — 87186 SC STD MICRODIL/AGAR DIL: CPT

## 2018-01-02 PROCEDURE — 97530 THERAPEUTIC ACTIVITIES: CPT

## 2018-01-02 PROCEDURE — 83550 IRON BINDING TEST: CPT

## 2018-01-02 PROCEDURE — 93880 EXTRACRANIAL BILAT STUDY: CPT

## 2018-01-02 PROCEDURE — 93306 TTE W/DOPPLER COMPLETE: CPT

## 2018-01-02 PROCEDURE — 86850 RBC ANTIBODY SCREEN: CPT

## 2018-01-02 PROCEDURE — 83735 ASSAY OF MAGNESIUM: CPT

## 2018-01-02 PROCEDURE — 82962 GLUCOSE BLOOD TEST: CPT

## 2018-01-02 PROCEDURE — 93005 ELECTROCARDIOGRAM TRACING: CPT

## 2018-01-02 PROCEDURE — 85027 COMPLETE CBC AUTOMATED: CPT

## 2018-01-02 PROCEDURE — 99239 HOSP IP/OBS DSCHRG MGMT >30: CPT

## 2018-01-02 PROCEDURE — 81001 URINALYSIS AUTO W/SCOPE: CPT

## 2018-01-02 PROCEDURE — 82728 ASSAY OF FERRITIN: CPT

## 2018-01-02 PROCEDURE — 97116 GAIT TRAINING THERAPY: CPT

## 2018-01-02 PROCEDURE — 82570 ASSAY OF URINE CREATININE: CPT

## 2018-01-02 PROCEDURE — 36415 COLL VENOUS BLD VENIPUNCTURE: CPT

## 2018-01-02 PROCEDURE — 84443 ASSAY THYROID STIM HORMONE: CPT

## 2018-01-02 PROCEDURE — 86780 TREPONEMA PALLIDUM: CPT

## 2018-01-02 PROCEDURE — 84466 ASSAY OF TRANSFERRIN: CPT

## 2018-01-02 PROCEDURE — 80053 COMPREHEN METABOLIC PANEL: CPT

## 2018-01-02 PROCEDURE — 87040 BLOOD CULTURE FOR BACTERIA: CPT

## 2018-01-02 PROCEDURE — 82550 ASSAY OF CK (CPK): CPT

## 2018-01-02 PROCEDURE — 82575 CREATININE CLEARANCE TEST: CPT

## 2018-01-02 PROCEDURE — 80048 BASIC METABOLIC PNL TOTAL CA: CPT

## 2018-01-02 PROCEDURE — 86901 BLOOD TYPING SEROLOGIC RH(D): CPT

## 2018-01-02 PROCEDURE — 72156 MRI NECK SPINE W/O & W/DYE: CPT

## 2018-01-02 PROCEDURE — 72141 MRI NECK SPINE W/O DYE: CPT

## 2018-01-02 PROCEDURE — 92526 ORAL FUNCTION THERAPY: CPT

## 2018-01-02 PROCEDURE — 84156 ASSAY OF PROTEIN URINE: CPT

## 2018-01-02 PROCEDURE — 73610 X-RAY EXAM OF ANKLE: CPT

## 2018-01-02 RX ORDER — CINACALCET 30 MG/1
1 TABLET, FILM COATED ORAL
Qty: 0 | Refills: 0 | COMMUNITY

## 2018-01-02 RX ORDER — PROPRANOLOL HCL 160 MG
1 CAPSULE, EXTENDED RELEASE 24HR ORAL
Qty: 0 | Refills: 0 | COMMUNITY

## 2018-01-02 RX ORDER — PROPRANOLOL HCL 160 MG
2 CAPSULE, EXTENDED RELEASE 24HR ORAL
Qty: 0 | Refills: 0 | COMMUNITY

## 2018-01-02 RX ORDER — DIVALPROEX SODIUM 500 MG/1
1 TABLET, DELAYED RELEASE ORAL
Qty: 0 | Refills: 0 | COMMUNITY
Start: 2018-01-02

## 2018-01-02 RX ORDER — QUETIAPINE FUMARATE 200 MG/1
1 TABLET, FILM COATED ORAL
Qty: 0 | Refills: 0 | COMMUNITY

## 2018-01-02 RX ORDER — AMLODIPINE BESYLATE 2.5 MG/1
1 TABLET ORAL
Qty: 0 | Refills: 0 | COMMUNITY
Start: 2018-01-02

## 2018-01-02 RX ORDER — TRAZODONE HCL 50 MG
1 TABLET ORAL
Qty: 0 | Refills: 0 | COMMUNITY

## 2018-01-02 RX ORDER — INSULIN LISPRO 100/ML
2 VIAL (ML) SUBCUTANEOUS
Qty: 0 | Refills: 0 | COMMUNITY
Start: 2018-01-02

## 2018-01-02 RX ORDER — CEFPODOXIME PROXETIL 100 MG
1 TABLET ORAL
Qty: 4 | Refills: 0 | OUTPATIENT
Start: 2018-01-02 | End: 2018-01-03

## 2018-01-02 RX ORDER — HYDRALAZINE HCL 50 MG
1 TABLET ORAL
Qty: 0 | Refills: 0 | COMMUNITY
Start: 2018-01-02

## 2018-01-02 RX ADMIN — Medication 1 GRAM(S): at 17:04

## 2018-01-02 RX ADMIN — Medication 1: at 17:03

## 2018-01-02 RX ADMIN — Medication 1: at 12:04

## 2018-01-02 RX ADMIN — QUETIAPINE FUMARATE 25 MILLIGRAM(S): 200 TABLET, FILM COATED ORAL at 05:31

## 2018-01-02 RX ADMIN — Medication 75 MICROGRAM(S): at 05:31

## 2018-01-02 RX ADMIN — Medication 1 GRAM(S): at 05:32

## 2018-01-02 RX ADMIN — Medication 1 GRAM(S): at 12:09

## 2018-01-02 RX ADMIN — Medication 650 MILLIGRAM(S): at 05:31

## 2018-01-02 RX ADMIN — Medication 50 MILLIGRAM(S): at 15:11

## 2018-01-02 RX ADMIN — CINACALCET 30 MILLIGRAM(S): 30 TABLET, FILM COATED ORAL at 17:04

## 2018-01-02 RX ADMIN — DIVALPROEX SODIUM 250 MILLIGRAM(S): 500 TABLET, DELAYED RELEASE ORAL at 17:05

## 2018-01-02 RX ADMIN — Medication 40 MILLIGRAM(S): at 05:31

## 2018-01-02 RX ADMIN — AMLODIPINE BESYLATE 10 MILLIGRAM(S): 2.5 TABLET ORAL at 05:31

## 2018-01-02 RX ADMIN — PANTOPRAZOLE SODIUM 40 MILLIGRAM(S): 20 TABLET, DELAYED RELEASE ORAL at 05:32

## 2018-01-02 RX ADMIN — CINACALCET 30 MILLIGRAM(S): 30 TABLET, FILM COATED ORAL at 05:31

## 2018-01-02 RX ADMIN — CEFTRIAXONE 100 GRAM(S): 500 INJECTION, POWDER, FOR SOLUTION INTRAMUSCULAR; INTRAVENOUS at 15:38

## 2018-01-02 RX ADMIN — QUETIAPINE FUMARATE 25 MILLIGRAM(S): 200 TABLET, FILM COATED ORAL at 17:06

## 2018-01-02 RX ADMIN — Medication 2 UNIT(S): at 12:05

## 2018-01-02 RX ADMIN — Medication 50 MILLIGRAM(S): at 05:31

## 2018-01-02 RX ADMIN — Medication 2 UNIT(S): at 17:03

## 2018-01-02 RX ADMIN — Medication 40 MILLIGRAM(S): at 17:07

## 2018-01-02 RX ADMIN — Medication 2 UNIT(S): at 08:14

## 2018-01-02 RX ADMIN — DIVALPROEX SODIUM 250 MILLIGRAM(S): 500 TABLET, DELAYED RELEASE ORAL at 05:31

## 2018-01-02 NOTE — PROGRESS NOTE ADULT - SUBJECTIVE AND OBJECTIVE BOX
NEPHROLOGY INTERVAL HPI/OVERNIGHT EVENTS:  pt awake and comfortable  no acute distress  according to nurse has been eating well    MEDICATIONS  (STANDING):  amLODIPine   Tablet 10 milliGRAM(s) Oral daily  atorvastatin 40 milliGRAM(s) Oral at bedtime  cefTRIAXone   IVPB      cefTRIAXone   IVPB 1 Gram(s) IV Intermittent every 24 hours  cinacalcet 30 milliGRAM(s) Oral two times a day  dextrose 5%. 1000 milliLiter(s) (50 mL/Hr) IV Continuous <Continuous>  dextrose 50% Injectable 12.5 Gram(s) IV Push once  dextrose 50% Injectable 25 Gram(s) IV Push once  dextrose 50% Injectable 25 Gram(s) IV Push once  diVALproex  milliGRAM(s) Oral every 12 hours  hydrALAZINE 50 milliGRAM(s) Oral every 8 hours  insulin glargine Injectable (LANTUS) 25 Unit(s) SubCutaneous at bedtime  insulin lispro (HumaLOG) corrective regimen sliding scale   SubCutaneous Before meals and at bedtime  insulin lispro Injectable (HumaLOG) 2 Unit(s) SubCutaneous three times a day before meals  levothyroxine 75 MICROGram(s) Oral daily  pantoprazole    Tablet 40 milliGRAM(s) Oral before breakfast  propranolol 40 milliGRAM(s) Oral two times a day  QUEtiapine 50 milliGRAM(s) Oral at bedtime  QUEtiapine 25 milliGRAM(s) Oral two times a day  sodium bicarbonate 650 milliGRAM(s) Oral three times a day  sucralfate 1 Gram(s) Oral Before meals and at bedtime  tamsulosin 0.4 milliGRAM(s) Oral at bedtime    MEDICATIONS  (PRN):  dextrose Gel 1 Dose(s) Oral once PRN Blood Glucose LESS THAN 70 milliGRAM(s)/deciliter  glucagon  Injectable 1 milliGRAM(s) IntraMuscular once PRN Glucose LESS THAN 70 milligrams/deciliter  hydrALAZINE Injectable 10 milliGRAM(s) IV Push every 8 hours PRN if sbp >170 or dbp >100      Allergies    No Known Allergies    Intolerances          Vital Signs Last 24 Hrs  T(C): 36.6 (02 Jan 2018 08:10), Max: 36.7 (01 Jan 2018 21:42)  T(F): 97.8 (02 Jan 2018 08:10), Max: 98.1 (01 Jan 2018 21:42)  HR: 52 (02 Jan 2018 08:10) (52 - 89)  BP: 121/70 (02 Jan 2018 08:10) (121/70 - 157/86)  BP(mean): --  RR: 18 (02 Jan 2018 08:10) (16 - 18)  SpO2: 97% (02 Jan 2018 08:10) (97% - 98%)    PHYSICAL EXAM:  GENERAL: NAD  EYES:  conjunctiva and sclera clear  NECK: Supple, No JVD/Bruit  NERVOUS SYSTEM:  Awake and interactive; non focal  CHEST:  CTA , diminished BS at bases  HEART:  RRR, No rub  ABDOMEN: Soft, NT/ND BS+  EXTREMITIES:  No edema    LABS:                        11.2   10.6  )-----------( 193      ( 02 Jan 2018 08:54 )             35.5     01-02    149<H>  |  111<H>  |  45.0<H>  ----------------------------<  72  3.8   |  28.0  |  1.62<H>    Ca    9.5      02 Jan 2018 08:54  Phos  3.0     01-02  Mg     2.1     01-02          Magnesium, Serum: 2.1 mg/dL (01-02 @ 08:54)  Phosphorus Level, Serum: 3.0 mg/dL (01-02 @ 08:54)      RADIOLOGY & ADDITIONAL TESTS:  < from: US Renal (12.30.17 @ 19:30) >   EXAM:  US KIDNEY(S)                          PROCEDURE DATE:  12/30/2017          INTERPRETATION:  CLINICAL INFORMATION: Urinary tract infection.  Acute   kidney injury.    COMPARISON: None available.    TECHNIQUE: Sonography of the kidneys and bladder.     FINDINGS:    Right kidney: Suboptimally visualized.  Appears echogenic.  8.7 cm in   length.  No renal mass, hydronephrosis or shadowing renal stone is   visualized.  An upper pole cyst measures 1.6 x 1.4 x 1.5 cm.  A lower   pole cyst measures 1.4 x 1.1 x 1.4 cm.  A midpole cyst measures 0.7 x 0.7   x 0.8 cm.    Left kidney: Suboptimally visualized.  Echogenicity not well assessed.    9.1 cm in length.  No renal mass, hydronephrosis or shadowing renal stone   is visualized.  Upper polecyst measuring 1.8 x 1.8 x 1.8 cm and 0.9 x   0.9 x 1.3 cm.  A lower pole cyst measures 2.1 x 2.1 x 2.1 cm.    Urinary bladder: Decompressed around a Hardin catheter.    IMPRESSION: Suboptimally visualized kidneys.  No hydronephrosis or renal   stonesare identified by sonographic technique.  The right kidney appears   echogenic.  Echogenicity of the left kidney is not well assessed.    Multiple renal cysts bilaterally measuring up to 1.6 cm and the right   kidney and 2.1 cm and the left kidney.    < end of copied text >

## 2018-01-02 NOTE — PROGRESS NOTE ADULT - ASSESSMENT
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
CRF (III): Hx chronic Li  ARF better  Metabolic acidosis  - cont IVF with bicarbonate  - will order oral bicarbonate as well  - follow labs    Hypercalcemia: PTH not suppressed r/o primary HPT  - will add Sensipar 30mg 2 x day  - trend labs
64 M PMH bipolar disorder on lithium with renal insufficiency admitted 12/20 from [facility] w/ new onset L wrist drop and L facial droop. MRI showed abnormal increased T2 signal on R side of cord C5-C6.    PLAN:  - D/w Dr. Kapoor- nephro OK w/ MRI c-spine w/wo contrast  - Further recommendations pending MRI    NOTE IS INCOMPLETE
66 yo M h/o DM, HTN, psychiatric disease on lithium, prostate s/p surgery, chronic indwelling gama for "nonfunctioning bladder" and urinary retention, parathyroid disease s/p resection and CKD here with hypertensive emergency.
66 yo M h/o DM, HTN, psychiatric disease on lithium, prostate s/p surgery, chronic indwelling gama for "nonfunctioning bladder" and urinary retention, parathyroid disease s/p resection and CKD presented for left wrist and facial droop. In the ED, pt was found to have hypertensive emergency requiring nicardipine infusion. MRI neck showed abnormal increased T2 signal consistent in the right side of the cord at C5 and C6 level measuring 5 x 6 mm in axial dimension and 19 mm in craniocaudal dimension. MRA neck negative for stenosis. MRI brain negative for ischemic injury. MRI cervical spine - broad differential diagnoses and the lesion does not correspond to clinical symptoms per neurosx - not a surgical candidate at this time.
68 y/o male with history of bipolar disorder (1) and renal insufficiency presented 12/20 from [facility] new onset left wrist drop and left facial droop.  - MRI with abnormal 5x6mm (axial) by 19mm (cc) area of increased T2 signal on R side of the cord at C5/C6 level with blooming on the GRE concerning for hemosiderin.   - LUE weakness with wrist extension favoring peripheral etiology of weakness, overall improving since admission and not inhibiting patient's self maneuvering. Outpatient neurology work up may be warranted - until further imaging reviewed, no current evidence of acute central process from NSG perspective.     PLAN:  Imaging reviewed.   Will order MRI c-spine w/contrast when renally feasible  Will continue to follow neuro exam  Steroids per neuro  Headache management per neuro/primary team
68 yo M h/o DM, HTN, psychiatric disease on lithium, prostate s/p surgery, chronic indwelling gama for "nonfunctioning bladder" and urinary retention, parathyroid disease s/p resection and CKD here with hypertensive emergency.
68 yo M h/o DM, HTN, psychiatric disease on lithium, prostate s/p surgery, chronic indwelling gama for "nonfunctioning bladder" and urinary retention, parathyroid disease s/p resection and CKD here with hypertensive emergency.
A/P: 64 M PMH bipolar disorder on lithium with renal insufficiency admitted 12/20 from [facility] w/ new onset L wrist drop and L facial droop. MRI showed abnormal increased T2 signal on R side of cord C5-C6.  - Will d/w attending  - D/w Dr. Kapoor- nephrology consult appreciated. Nephrology would like to perform 24 hour urine creatinine before determining if patient can go forward with MRI C-spine w/wo contrast  - Further recommendations pending MRI
A/P: 67 M PMH bipolar disorder on lithium with renal insufficiency admitted 12/20 from [facility] w/ new onset L wrist drop and L facial droop. MRI without contrast showed abnormal increased T2 signal on R side of cord C5-C6, MRI with contrast done, showing mildly improved increased T2 signal.  - D/w Dr. Judd  - No acute neurosurgical intervention warranted at this time  - Supportive care per primary care team
A/P: 67y Male PMH bipolar disorder and renal insufficiency admitted 12/20 from [facility] w/ new onset L wrist drop and L facial droop. MRI showed abnormal increased T2 signal on R side of cord at C5-C6  - D/w Dr. Judd  - Neurology recommendations reviewed- they have signed off. Agree that symptoms unlikely from image findings  - Pending nephrology consult to coordinate with primary care team MRI C-Spine with and without contrast. Will follow up results  - Supportive care/further medical management per primary care team
CKD III: Hx chronic Li use  Electrolytes BP acceptable  Renal function seems to be at baseline  Metabolic acidosis improving  Elevated BUN also due to steroids  -  oral bicarbonate  - avoid nephrotoxins  - follow labs    Hypercalcemia: PTH not suppressed r/o primary HPT  - added Sensipar 30mg 2 x day  - monitor
CRF (III): Hx chronic Li  ARF better  Metabolic acidosis  -  oral bicarbonate  - avoid nephrotoxins  - follow labs    Hypercalcemia: PTH not suppressed r/o primary HPT  - added Sensipar 30mg 2 x day  - trend labs
CRF (III): Hx chronic Li  Renal function at likely at baseline  Metabolic acidosis  Elevated BUN also due to steroids  -  oral bicarbonate  - avoid nephrotoxins  - follow labs    Hypercalcemia: PTH not suppressed r/o primary HPT  - added Sensipar 30mg 2 x day  - monitor
CRF (III): Hx chronic Li  Renal function at likely at baseline  Metabolic acidosis improved  Hypernatremia  - pt encouraged to drink more liquids; nurse to ensure that he has access to water  -  oral bicarbonate  - avoid nephrotoxins  - follow labs    Hypercalcemia: PTH not suppressed r/o primary HPT  - added Sensipar 30mg 2 x day  - monitor labs
DM , CRF 3, Hypercalcemia with low PTH 1 mo ago, dehydrated.
Hypercalcemia improving- iPTH sl high- suspect Primary hyperparathyroidism  KOFI cr overall downtrending   Lesion of cervical nerve root  eGFR ok - from renal perspective can get MRI with Gadolineum if OK with Radiologist  Decrease NS to 50 cc/H
Hypercalcemia improving-TH sl high- likely Primary hyperparathyroidism  KOFI cr overall downtrending   Lesion of cervical nerve root  eGFR ok - Can get MRI with Gadolineum if OK with Radiologist  Decrease NS to 50 cc/H
Impression:  Encephalopathy- Most likely due to an underlying dementia.   Long h/o bipolar disorder for which he has been on psychotropic meds for years  Most likely has secondary Parkinsonism  (may consider a Lewy body dementia)  Cervical cord lesion ? etiology    Recommendation;  taper off decadron by 2mg daily till off  neurosurgical follow up to address cord lesion  mobilize OOB to chair  subacute rehab eval
This is a 66 yo man with PMH of DM, HTN, psychiatric disease on Lithium for "years", prostate disease s/p surgery, chronic indwelling gama for "nonfunctioning bladder" and Urinary retention, parathyroid disease s/p resection and CRF who was following a nephrologist in [place] but was moved to [facility], admitted for left wrist and facial droop, r/o CVA, hypertensive urgency- noted to have Cervical lesion on studies,
This is a 66 yo man with PMH of DM, HTN, psychiatric disease on Lithium for "years", prostate disease s/p surgery, chronic indwelling gama for "nonfunctioning bladder" and Urinary retention, parathyroid disease s/p resection and CRF who was following a nephrologist in [place] but was moved to [facility], admitted for left wrist and facial droop, r/o CVA, hypertensive urgency- noted to have Cervical lesion on studies,
66 yo M h/o DM, HTN, psychiatric disease on lithium, prostate s/p surgery, chronic indwelling gama for "nonfunctioning bladder" and urinary retention, parathyroid disease s/p resection and CKD presented for left wrist and facial droop. In the ED, pt was found to have hypertensive emergency requiring nicardipine infusion. MRI neck showed abnormal increased T2 signal consistent in the right side of the cord at C5 and C6 level measuring 5 x 6 mm in axial dimension and 19 mm in craniocaudal dimension. MRA neck negative for stenosis. MRI brain negative for ischemic injury. MRI cervical spine - broad differential diagnoses and the lesion does not correspond to clinical symptoms per neurosx - not a surgical candidate at this time.
68 yo M h/o DM, HTN, psychiatric disease on lithium, prostate s/p surgery, chronic indwelling gama for "nonfunctioning bladder" and urinary retention, parathyroid disease s/p resection and CKD here with hypertensive emergency.
68 yo M h/o DM, HTN, psychiatric disease on lithium, prostate s/p surgery, chronic indwelling gama for "nonfunctioning bladder" and urinary retention, parathyroid disease s/p resection and CKD here with hypertensive emergency.
68 yo M h/o DM, HTN, psychiatric disease on lithium, prostate s/p surgery, chronic indwelling gama for "nonfunctioning bladder" and urinary retention, parathyroid disease s/p resection and CKD presented for left wrist and facial droop. In the ED, pt was found to have hypertensive emergency requiring nicardipine infusion. MRI neck showed abnormal increased T2 signal consistent in the right side of the cord at C5 and C6 level measuring 5 x 6 mm in axial dimension and 19 mm in craniocaudal dimension. MRA neck negative for stenosis. MRI brain negative for ischemic injury. MRI cervical spine - broad differential diagnoses and the lesion does not correspond to clinical symptoms per neurosx - not a surgical candidate at this time.

## 2018-01-02 NOTE — PROGRESS NOTE ADULT - PROVIDER SPECIALTY LIST ADULT
Hospitalist
Nephrology
Neurology
Neurosurgery
Nephrology
Neurology
Hospitalist

## 2018-01-04 LAB
CULTURE RESULTS: SIGNIFICANT CHANGE UP
CULTURE RESULTS: SIGNIFICANT CHANGE UP
SPECIMEN SOURCE: SIGNIFICANT CHANGE UP
SPECIMEN SOURCE: SIGNIFICANT CHANGE UP

## 2018-01-10 ENCOUNTER — INPATIENT (INPATIENT)
Facility: HOSPITAL | Age: 68
LOS: 9 days | Discharge: HOME | DRG: 880 | End: 2018-01-20
Attending: HOSPITALIST | Admitting: HOSPITALIST
Payer: MEDICARE

## 2018-01-10 VITALS
WEIGHT: 177.91 LBS | OXYGEN SATURATION: 98 % | SYSTOLIC BLOOD PRESSURE: 159 MMHG | DIASTOLIC BLOOD PRESSURE: 83 MMHG | HEIGHT: 68 IN | RESPIRATION RATE: 18 BRPM | TEMPERATURE: 98 F | HEART RATE: 86 BPM

## 2018-01-10 DIAGNOSIS — Z90.79 ACQUIRED ABSENCE OF OTHER GENITAL ORGAN(S): Chronic | ICD-10-CM

## 2018-01-10 DIAGNOSIS — R69 ILLNESS, UNSPECIFIED: ICD-10-CM

## 2018-01-10 DIAGNOSIS — F31.12 BIPOLAR DISORDER, CURRENT EPISODE MANIC WITHOUT PSYCHOTIC FEATURES, MODERATE: ICD-10-CM

## 2018-01-10 LAB
ALBUMIN SERPL ELPH-MCNC: 3.2 G/DL — LOW (ref 3.3–5.2)
ALP SERPL-CCNC: 90 U/L — SIGNIFICANT CHANGE UP (ref 40–120)
ALT FLD-CCNC: 27 U/L — SIGNIFICANT CHANGE UP
ANION GAP SERPL CALC-SCNC: 17 MMOL/L — SIGNIFICANT CHANGE UP (ref 5–17)
APPEARANCE UR: CLEAR — SIGNIFICANT CHANGE UP
APTT BLD: 30.7 SEC — SIGNIFICANT CHANGE UP (ref 27.5–37.4)
AST SERPL-CCNC: 25 U/L — SIGNIFICANT CHANGE UP
BACTERIA # UR AUTO: ABNORMAL
BILIRUB SERPL-MCNC: 0.5 MG/DL — SIGNIFICANT CHANGE UP (ref 0.4–2)
BILIRUB UR-MCNC: NEGATIVE — SIGNIFICANT CHANGE UP
BUN SERPL-MCNC: 27 MG/DL — HIGH (ref 8–20)
CALCIUM SERPL-MCNC: 10.1 MG/DL — SIGNIFICANT CHANGE UP (ref 8.6–10.2)
CHLORIDE SERPL-SCNC: 112 MMOL/L — HIGH (ref 98–107)
CO2 SERPL-SCNC: 20 MMOL/L — LOW (ref 22–29)
COLOR SPEC: YELLOW — SIGNIFICANT CHANGE UP
CREAT SERPL-MCNC: 1.9 MG/DL — HIGH (ref 0.5–1.3)
DIFF PNL FLD: ABNORMAL
EPI CELLS # UR: SIGNIFICANT CHANGE UP
GLUCOSE SERPL-MCNC: 181 MG/DL — HIGH (ref 70–115)
GLUCOSE UR QL: NEGATIVE MG/DL — SIGNIFICANT CHANGE UP
HCT VFR BLD CALC: 30.7 % — LOW (ref 42–52)
HGB BLD-MCNC: 9.5 G/DL — LOW (ref 14–18)
INR BLD: 1.12 RATIO — SIGNIFICANT CHANGE UP (ref 0.88–1.16)
KETONES UR-MCNC: NEGATIVE — SIGNIFICANT CHANGE UP
LEUKOCYTE ESTERASE UR-ACNC: ABNORMAL
MCHC RBC-ENTMCNC: 29 PG — SIGNIFICANT CHANGE UP (ref 27–31)
MCHC RBC-ENTMCNC: 30.9 G/DL — LOW (ref 32–36)
MCV RBC AUTO: 93.6 FL — SIGNIFICANT CHANGE UP (ref 80–94)
NITRITE UR-MCNC: POSITIVE
PH UR: 8 — SIGNIFICANT CHANGE UP (ref 5–8)
PLATELET # BLD AUTO: 185 K/UL — SIGNIFICANT CHANGE UP (ref 150–400)
POTASSIUM SERPL-MCNC: 4.9 MMOL/L — SIGNIFICANT CHANGE UP (ref 3.5–5.3)
POTASSIUM SERPL-SCNC: 4.9 MMOL/L — SIGNIFICANT CHANGE UP (ref 3.5–5.3)
PROT SERPL-MCNC: 6 G/DL — LOW (ref 6.6–8.7)
PROT UR-MCNC: 30 MG/DL
PROTHROM AB SERPL-ACNC: 12.3 SEC — SIGNIFICANT CHANGE UP (ref 9.8–12.7)
RBC # BLD: 3.28 M/UL — LOW (ref 4.6–6.2)
RBC # FLD: 16 % — HIGH (ref 11–15.6)
RBC CASTS # UR COMP ASSIST: ABNORMAL /HPF (ref 0–4)
SODIUM SERPL-SCNC: 149 MMOL/L — HIGH (ref 135–145)
SP GR SPEC: 1.01 — SIGNIFICANT CHANGE UP (ref 1.01–1.02)
UROBILINOGEN FLD QL: NEGATIVE MG/DL — SIGNIFICANT CHANGE UP
VALPROATE SERPL-MCNC: 10.1 UG/ML — LOW (ref 50–100)
WBC # BLD: 10.6 K/UL — SIGNIFICANT CHANGE UP (ref 4.8–10.8)
WBC # FLD AUTO: 10.6 K/UL — SIGNIFICANT CHANGE UP (ref 4.8–10.8)
WBC UR QL: ABNORMAL

## 2018-01-10 RX ORDER — DIVALPROEX SODIUM 500 MG/1
250 TABLET, DELAYED RELEASE ORAL
Qty: 0 | Refills: 0 | Status: DISCONTINUED | OUTPATIENT
Start: 2018-01-10 | End: 2018-01-20

## 2018-01-10 RX ORDER — QUETIAPINE FUMARATE 200 MG/1
100 TABLET, FILM COATED ORAL AT BEDTIME
Qty: 0 | Refills: 0 | Status: DISCONTINUED | OUTPATIENT
Start: 2018-01-10 | End: 2018-01-20

## 2018-01-10 RX ORDER — QUETIAPINE FUMARATE 200 MG/1
25 TABLET, FILM COATED ORAL
Qty: 0 | Refills: 0 | Status: DISCONTINUED | OUTPATIENT
Start: 2018-01-10 | End: 2018-01-20

## 2018-01-10 RX ORDER — SODIUM CHLORIDE 9 MG/ML
1000 INJECTION INTRAMUSCULAR; INTRAVENOUS; SUBCUTANEOUS ONCE
Qty: 0 | Refills: 0 | Status: COMPLETED | OUTPATIENT
Start: 2018-01-10 | End: 2018-01-10

## 2018-01-10 RX ORDER — HYDRALAZINE HCL 50 MG
50 TABLET ORAL EVERY 8 HOURS
Qty: 0 | Refills: 0 | Status: DISCONTINUED | OUTPATIENT
Start: 2018-01-10 | End: 2018-01-20

## 2018-01-10 RX ORDER — CINACALCET 30 MG/1
30 TABLET, FILM COATED ORAL
Qty: 0 | Refills: 0 | Status: DISCONTINUED | OUTPATIENT
Start: 2018-01-10 | End: 2018-01-20

## 2018-01-10 RX ORDER — INSULIN GLARGINE 100 [IU]/ML
22 INJECTION, SOLUTION SUBCUTANEOUS AT BEDTIME
Qty: 0 | Refills: 0 | Status: DISCONTINUED | OUTPATIENT
Start: 2018-01-10 | End: 2018-01-20

## 2018-01-10 RX ORDER — SUCRALFATE 1 G
1 TABLET ORAL
Qty: 0 | Refills: 0 | Status: DISCONTINUED | OUTPATIENT
Start: 2018-01-10 | End: 2018-01-10

## 2018-01-10 RX ORDER — HALOPERIDOL DECANOATE 100 MG/ML
2.5 INJECTION INTRAMUSCULAR EVERY 6 HOURS
Qty: 0 | Refills: 0 | Status: DISCONTINUED | OUTPATIENT
Start: 2018-01-10 | End: 2018-01-20

## 2018-01-10 RX ORDER — LEVOTHYROXINE SODIUM 125 MCG
75 TABLET ORAL DAILY
Qty: 0 | Refills: 0 | Status: DISCONTINUED | OUTPATIENT
Start: 2018-01-10 | End: 2018-01-20

## 2018-01-10 RX ORDER — PANTOPRAZOLE SODIUM 20 MG/1
40 TABLET, DELAYED RELEASE ORAL
Qty: 0 | Refills: 0 | Status: DISCONTINUED | OUTPATIENT
Start: 2018-01-10 | End: 2018-01-20

## 2018-01-10 RX ORDER — ATORVASTATIN CALCIUM 80 MG/1
40 TABLET, FILM COATED ORAL AT BEDTIME
Qty: 0 | Refills: 0 | Status: DISCONTINUED | OUTPATIENT
Start: 2018-01-10 | End: 2018-01-20

## 2018-01-10 RX ORDER — AMLODIPINE BESYLATE 2.5 MG/1
10 TABLET ORAL DAILY
Qty: 0 | Refills: 0 | Status: DISCONTINUED | OUTPATIENT
Start: 2018-01-10 | End: 2018-01-20

## 2018-01-10 RX ORDER — TAMSULOSIN HYDROCHLORIDE 0.4 MG/1
0.4 CAPSULE ORAL AT BEDTIME
Qty: 0 | Refills: 0 | Status: DISCONTINUED | OUTPATIENT
Start: 2018-01-10 | End: 2018-01-20

## 2018-01-10 RX ORDER — PROPRANOLOL HCL 160 MG
20 CAPSULE, EXTENDED RELEASE 24HR ORAL
Qty: 0 | Refills: 0 | Status: DISCONTINUED | OUTPATIENT
Start: 2018-01-10 | End: 2018-01-20

## 2018-01-10 RX ORDER — SUCRALFATE 1 G
1 TABLET ORAL
Qty: 0 | Refills: 0 | Status: DISCONTINUED | OUTPATIENT
Start: 2018-01-10 | End: 2018-01-20

## 2018-01-10 RX ORDER — HALOPERIDOL DECANOATE 100 MG/ML
2.5 INJECTION INTRAMUSCULAR ONCE
Qty: 0 | Refills: 0 | Status: COMPLETED | OUTPATIENT
Start: 2018-01-10 | End: 2018-01-10

## 2018-01-10 RX ADMIN — PANTOPRAZOLE SODIUM 40 MILLIGRAM(S): 20 TABLET, DELAYED RELEASE ORAL at 22:59

## 2018-01-10 RX ADMIN — SODIUM CHLORIDE 2000 MILLILITER(S): 9 INJECTION INTRAMUSCULAR; INTRAVENOUS; SUBCUTANEOUS at 13:13

## 2018-01-10 RX ADMIN — Medication 50 MILLIGRAM(S): at 22:59

## 2018-01-10 RX ADMIN — Medication 1 GRAM(S): at 22:59

## 2018-01-10 RX ADMIN — HALOPERIDOL DECANOATE 2.5 MILLIGRAM(S): 100 INJECTION INTRAMUSCULAR at 12:30

## 2018-01-10 RX ADMIN — INSULIN GLARGINE 22 UNIT(S): 100 INJECTION, SOLUTION SUBCUTANEOUS at 23:15

## 2018-01-10 RX ADMIN — HALOPERIDOL DECANOATE 2.5 MILLIGRAM(S): 100 INJECTION INTRAMUSCULAR at 18:59

## 2018-01-10 NOTE — ED PROVIDER NOTE - MEDICAL DECISION MAKING DETAILS
No need to check urine today, as patient is without fever, and without acute delirium; urine colonized chronically, no utility in UA today; renal failure is within range of patient's baseline; depakote level clearly low, likely non compliance with meds as explanation for behavior; medically clear for evaluation in behavioral health and psychiatric admission if necessary

## 2018-01-10 NOTE — ED BEHAVIORAL HEALTH ASSESSMENT NOTE - DESCRIPTION
on 1 to 1    Vital Signs Last 24 Hrs  T(C): 36.8 (10 Fidel 2018 10:56), Max: 36.8 (10 Fidel 2018 10:56)  T(F): 98.2 (10 Fidel 2018 10:56), Max: 98.2 (10 Fidel 2018 10:56)  HR: 86 (10 Fidel 2018 10:56) (86 - 86)  BP: 159/83 (10 Fidel 2018 10:56) (159/83 - 159/83)  RR: 18 (10 Fidel 2018 10:56) (18 - 18)  SpO2: 98% (10 Fidel 2018 10:56) (98% - 98%) GERD, HTN, HLD, hypothyroidism, BPH, DM Lives at [facility].

## 2018-01-10 NOTE — ED BEHAVIORAL HEALTH ASSESSMENT NOTE - CURRENT MEDICATION
Lantus 100 unit/mL SQ solution - 22 units SQ qhs  Hydralazine - 50mg tablet PO every 8 hours  Depakote 250mg - 1 tablet (250mg) every 12 hours Depakote 250mg tablet (250mg) every 12 hours  Seroquel 100mg tablet PO qhs  Quetiapine 25mg tablet PO BID  See nursing home note
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
## 2018-01-10 NOTE — ED PROVIDER NOTE - OBJECTIVE STATEMENT
66 yo male from nursing home with hx of bipolar and dementia; as well as known CKD Stage III; has had multiple visits here before for agitation; at times has been considered delirium, treated and returned to nursing home; today sent in from nursing home for reports of persistent agitation and not taking his meds; hx otherwise limited, patient unable to contribute due to psychiatric illness;  +chronic gama in place

## 2018-01-10 NOTE — ED ADULT NURSE NOTE - OBJECTIVE STATEMENT
67 year old male on stretcher with NA at bedside for constant observation, wearing yellow gown. Pt is awake, alert, unable to answer questions appropriate. When asked for name states "Don't do that", does not answer othr questions. Does not make eye contact. Fall precautions in place, will monitor.

## 2018-01-10 NOTE — ED BEHAVIORAL HEALTH ASSESSMENT NOTE - MEDICAL ISSUES AND PLAN (INCLUDE STANDING AND PRN MEDICATION)
GERD, HTN, HLD, hypothyroidism, BPH, DM; continue current regimen. GERD, HTN, HLD, hypothyroidism, BPH, DM, hyperparathyroidism ; continue current regimen on nursing home list.

## 2018-01-10 NOTE — ED PROVIDER NOTE - PROGRESS NOTE DETAILS
xenia: pt signedo ut to me; hypernatremic and UTI will tx with ceftrizxone--admit; pt currently in [facility] for aggitation

## 2018-01-10 NOTE — ED ADULT NURSE REASSESSMENT NOTE - NS ED NURSE REASSESS COMMENT FT1
Patient continues to be combative and oppositional. Refused all po meds and offers of PO fluids and food. Took sips of water with much encouragement. Tries to sit up in bed and confused at times. Stated he wanted to see the "machines upstairs". Unable to redirect patient when becoming increasingly agitated. Medicated with Haldol 2.5 mg IM for increased agitation and combative behavior. On 1:1 observation for safety. Effectiveness of medication pending. Unable to transfer to inpatient care due to indwelling Hardin catheter and facilities criteria not accepting patient with F/C.

## 2018-01-10 NOTE — ED BEHAVIORAL HEALTH ASSESSMENT NOTE - AXIS III
GERD, HTN, HLD, hypothyroidism, BPH, DM GERD, HTN, HLD, hypothyroidism, BPH, DM, hyperparathyroidism

## 2018-01-10 NOTE — ED ADULT NURSE REASSESSMENT NOTE - NS ED NURSE REASSESS COMMENT FT1
Assumed care of pt @2300. Pt cooperative during medication administration. PO fluids given to pt. Pt currently resting/sleeping comfortably. Pt in no apparent discomfort or distress. 1:1 by pt's bedside to ensure safety. Will continue to monitor the pt for safety. Assumed care of pt @2300. Pt cooperative during medication administration. PO fluids given to pt. Pt currently resting/sleeping comfortably. Pt in no apparent discomfort or distress. 1:1 by pt's bedside to ensure pt safety. Will continue to monitor the pt.

## 2018-01-10 NOTE — ED BEHAVIORAL HEALTH ASSESSMENT NOTE - PSYCHIATRIC ISSUES AND PLAN (INCLUDE STANDING AND PRN MEDICATION)
bipolar disorder; Depakote 250mg tablet PO every 12 hours bipolar disorder; Depakote 250mg tablet (250mg) every 12 hours, Seroquel 100mg tablet PO qhs, Quetiapine 25mg tablet PO BID

## 2018-01-10 NOTE — ED ADULT TRIAGE NOTE - CHIEF COMPLAINT QUOTE
pt alert and awake, normal baseline, BIBA from [facility] for aggressive behavior and not taking meds, pt has hx of dementia, arrived with natan from NH

## 2018-01-10 NOTE — ED ADULT NURSE NOTE - CHIEF COMPLAINT QUOTE
pt alert and awake, normal baseline, BIBA from [facility] for aggressive behavior and not taking meds, pt has hx of dementia, arrived with natan from NH

## 2018-01-10 NOTE — ED BEHAVIORAL HEALTH ASSESSMENT NOTE - OTHER
NA Nursing home transfer packet unable to assess pt laying supine lip smacking, sticking out tongue 9.37

## 2018-01-10 NOTE — ED BEHAVIORAL HEALTH ASSESSMENT NOTE - SUMMARY
67y male residing at nursing home transferred due to acute agitation, aggressiveness/combativeness, and delusions. Non-compliant with medications; un-managed bipolar disorder; not suitable for return to nursing home until psychiatrically managed. 67y male with PMHx of bipolar disorder, GERD, HTN, HLD, hypothyroidism, BPH, DM, hyperparathyroidism, residing at nursing home transferred due to acute agitation, aggressiveness/combativeness, and delusions. Non-compliant with all medications; un-managed bipolar disorder; not suitable for return to nursing home until psychiatrically managed.

## 2018-01-11 DIAGNOSIS — F31.12 BIPOLAR DISORDER, CURRENT EPISODE MANIC WITHOUT PSYCHOTIC FEATURES, MODERATE: ICD-10-CM

## 2018-01-11 LAB
ANION GAP SERPL CALC-SCNC: 14 MMOL/L — SIGNIFICANT CHANGE UP (ref 5–17)
ANION GAP SERPL CALC-SCNC: 14 MMOL/L — SIGNIFICANT CHANGE UP (ref 5–17)
BUN SERPL-MCNC: 21 MG/DL — HIGH (ref 8–20)
BUN SERPL-MCNC: 23 MG/DL — HIGH (ref 8–20)
CALCIUM SERPL-MCNC: 10 MG/DL — SIGNIFICANT CHANGE UP (ref 8.6–10.2)
CALCIUM SERPL-MCNC: 9.9 MG/DL — SIGNIFICANT CHANGE UP (ref 8.6–10.2)
CHLORIDE SERPL-SCNC: 118 MMOL/L — HIGH (ref 98–107)
CHLORIDE SERPL-SCNC: 120 MMOL/L — HIGH (ref 98–107)
CO2 SERPL-SCNC: 19 MMOL/L — LOW (ref 22–29)
CO2 SERPL-SCNC: 20 MMOL/L — LOW (ref 22–29)
CREAT SERPL-MCNC: 1.6 MG/DL — HIGH (ref 0.5–1.3)
CREAT SERPL-MCNC: 1.61 MG/DL — HIGH (ref 0.5–1.3)
GLUCOSE BLDC GLUCOMTR-MCNC: 114 MG/DL — HIGH (ref 70–99)
GLUCOSE BLDC GLUCOMTR-MCNC: 99 MG/DL — SIGNIFICANT CHANGE UP (ref 70–99)
GLUCOSE SERPL-MCNC: 115 MG/DL — SIGNIFICANT CHANGE UP (ref 70–115)
GLUCOSE SERPL-MCNC: 115 MG/DL — SIGNIFICANT CHANGE UP (ref 70–115)
POTASSIUM SERPL-MCNC: 4.4 MMOL/L — SIGNIFICANT CHANGE UP (ref 3.5–5.3)
POTASSIUM SERPL-MCNC: 4.6 MMOL/L — SIGNIFICANT CHANGE UP (ref 3.5–5.3)
POTASSIUM SERPL-SCNC: 4.4 MMOL/L — SIGNIFICANT CHANGE UP (ref 3.5–5.3)
POTASSIUM SERPL-SCNC: 4.6 MMOL/L — SIGNIFICANT CHANGE UP (ref 3.5–5.3)
SODIUM SERPL-SCNC: 151 MMOL/L — HIGH (ref 135–145)
SODIUM SERPL-SCNC: 154 MMOL/L — HIGH (ref 135–145)

## 2018-01-11 PROCEDURE — 99285 EMERGENCY DEPT VISIT HI MDM: CPT

## 2018-01-11 PROCEDURE — 99223 1ST HOSP IP/OBS HIGH 75: CPT

## 2018-01-11 RX ORDER — INSULIN LISPRO 100/ML
VIAL (ML) SUBCUTANEOUS
Qty: 0 | Refills: 0 | Status: DISCONTINUED | OUTPATIENT
Start: 2018-01-11 | End: 2018-01-20

## 2018-01-11 RX ORDER — CEFTRIAXONE 500 MG/1
1 INJECTION, POWDER, FOR SOLUTION INTRAMUSCULAR; INTRAVENOUS ONCE
Qty: 0 | Refills: 0 | Status: COMPLETED | OUTPATIENT
Start: 2018-01-11 | End: 2018-01-11

## 2018-01-11 RX ORDER — SODIUM CHLORIDE 9 MG/ML
1000 INJECTION, SOLUTION INTRAVENOUS
Qty: 0 | Refills: 0 | Status: DISCONTINUED | OUTPATIENT
Start: 2018-01-11 | End: 2018-01-20

## 2018-01-11 RX ORDER — DEXTROSE 50 % IN WATER 50 %
1 SYRINGE (ML) INTRAVENOUS ONCE
Qty: 0 | Refills: 0 | Status: DISCONTINUED | OUTPATIENT
Start: 2018-01-11 | End: 2018-01-20

## 2018-01-11 RX ORDER — DEXTROSE 50 % IN WATER 50 %
25 SYRINGE (ML) INTRAVENOUS ONCE
Qty: 0 | Refills: 0 | Status: DISCONTINUED | OUTPATIENT
Start: 2018-01-11 | End: 2018-01-20

## 2018-01-11 RX ORDER — DEXTROSE 50 % IN WATER 50 %
12.5 SYRINGE (ML) INTRAVENOUS ONCE
Qty: 0 | Refills: 0 | Status: DISCONTINUED | OUTPATIENT
Start: 2018-01-11 | End: 2018-01-20

## 2018-01-11 RX ORDER — GLUCAGON INJECTION, SOLUTION 0.5 MG/.1ML
1 INJECTION, SOLUTION SUBCUTANEOUS ONCE
Qty: 0 | Refills: 0 | Status: DISCONTINUED | OUTPATIENT
Start: 2018-01-11 | End: 2018-01-20

## 2018-01-11 RX ORDER — SODIUM CHLORIDE 9 MG/ML
1000 INJECTION INTRAMUSCULAR; INTRAVENOUS; SUBCUTANEOUS
Qty: 0 | Refills: 0 | Status: DISCONTINUED | OUTPATIENT
Start: 2018-01-11 | End: 2018-01-11

## 2018-01-11 RX ADMIN — Medication 1 GRAM(S): at 06:21

## 2018-01-11 RX ADMIN — Medication 20 MILLIGRAM(S): at 06:21

## 2018-01-11 RX ADMIN — Medication 50 MILLIGRAM(S): at 13:35

## 2018-01-11 RX ADMIN — AMLODIPINE BESYLATE 10 MILLIGRAM(S): 2.5 TABLET ORAL at 06:21

## 2018-01-11 RX ADMIN — Medication 1 GRAM(S): at 22:14

## 2018-01-11 RX ADMIN — QUETIAPINE FUMARATE 100 MILLIGRAM(S): 200 TABLET, FILM COATED ORAL at 22:13

## 2018-01-11 RX ADMIN — TAMSULOSIN HYDROCHLORIDE 0.4 MILLIGRAM(S): 0.4 CAPSULE ORAL at 22:09

## 2018-01-11 RX ADMIN — CINACALCET 30 MILLIGRAM(S): 30 TABLET, FILM COATED ORAL at 06:21

## 2018-01-11 RX ADMIN — Medication 50 MILLIGRAM(S): at 06:22

## 2018-01-11 RX ADMIN — Medication 50 MILLIGRAM(S): at 22:10

## 2018-01-11 RX ADMIN — QUETIAPINE FUMARATE 25 MILLIGRAM(S): 200 TABLET, FILM COATED ORAL at 06:21

## 2018-01-11 RX ADMIN — Medication 1 GRAM(S): at 17:42

## 2018-01-11 RX ADMIN — SODIUM CHLORIDE 100 MILLILITER(S): 9 INJECTION, SOLUTION INTRAVENOUS at 18:21

## 2018-01-11 RX ADMIN — Medication 75 MICROGRAM(S): at 06:21

## 2018-01-11 RX ADMIN — CINACALCET 30 MILLIGRAM(S): 30 TABLET, FILM COATED ORAL at 17:42

## 2018-01-11 RX ADMIN — DIVALPROEX SODIUM 250 MILLIGRAM(S): 500 TABLET, DELAYED RELEASE ORAL at 17:42

## 2018-01-11 RX ADMIN — PANTOPRAZOLE SODIUM 40 MILLIGRAM(S): 20 TABLET, DELAYED RELEASE ORAL at 06:21

## 2018-01-11 RX ADMIN — CEFTRIAXONE 100 GRAM(S): 500 INJECTION, POWDER, FOR SOLUTION INTRAMUSCULAR; INTRAVENOUS at 11:03

## 2018-01-11 RX ADMIN — Medication 20 MILLIGRAM(S): at 17:42

## 2018-01-11 RX ADMIN — INSULIN GLARGINE 22 UNIT(S): 100 INJECTION, SOLUTION SUBCUTANEOUS at 22:13

## 2018-01-11 RX ADMIN — DIVALPROEX SODIUM 250 MILLIGRAM(S): 500 TABLET, DELAYED RELEASE ORAL at 06:21

## 2018-01-11 RX ADMIN — QUETIAPINE FUMARATE 25 MILLIGRAM(S): 200 TABLET, FILM COATED ORAL at 17:42

## 2018-01-11 RX ADMIN — ATORVASTATIN CALCIUM 40 MILLIGRAM(S): 80 TABLET, FILM COATED ORAL at 22:13

## 2018-01-11 NOTE — ED ADULT NURSE REASSESSMENT NOTE - NS ED NURSE REASSESS COMMENT FT1
Pt currently resting/sleeping comfortably. Pt in no apparent discomfort or distress. 1:1 by pt's bedside. Safety maintained. Will continue to monitor the pt.

## 2018-01-11 NOTE — ED BEHAVIORAL HEALTH NOTE - BEHAVIORAL HEALTH NOTE
SW note: as per Dr. Fitch pt. is not medically stable for psych transfer at this time.  pt. is being moved to medical side for further evaluation.

## 2018-01-11 NOTE — H&P ADULT - ASSESSMENT
67M with a history of bipolar disorder presenting with agitation/combativeness from the rehabilitation facility found to have hypernatremia and urinary tract infection.    Hypernatremia - Intravenous fluids initiated. Repeat laboratory studies ordered to monitor.    Urinary tract infection - On ceftriaxone. Urine culture notes gram negative rods with sensitivity pending.    Urinary retention - Chronic Hardin catheter in place and draining clear urine. No signs of hematuria.    Bipolar disorder - Behavioral Health consultation noted. On quetiapine and divalproex, with lorazepam as needed.    Hypothyroidism - On levothyroxine.    Hypertension - Close blood pressure monitoring. On propranolol and amlodipine.    Diabetes - Insulin coverage, close monitoring of blood glucose levels.

## 2018-01-11 NOTE — H&P ADULT - NSHPPHYSICALEXAM_GEN_ALL_CORE
Vital Signs Last 24 Hrs  T(C): 36.4 (11 Jan 2018 07:20), Max: 36.9 (10 Fidel 2018 19:05)  T(F): 97.6 (11 Jan 2018 07:20), Max: 98.5 (11 Jan 2018 03:26)  HR: 62 (11 Jan 2018 07:20) (62 - 86)  BP: 131/65 (11 Jan 2018 07:20) (131/65 - 174/83)  BP(mean): --  RR: 18 (11 Jan 2018 07:20) (18 - 18)  SpO2: 99% (11 Jan 2018 07:20) (98% - 100%)    General appearance: NAD, Somnolent  HEENT: NCAT, Conjunctiva clear, EOMI, Pupils reactive  Neck: Supple, No JVD, No tenderness  Lungs: Clear to auscultation, Breath sound equal bilaterally, No wheezes, No rales  Cardiovascular: S1S2, Regular rhythm  Abdomen: Soft, Nontender, Nondistended, No guarding/rebound, Positive bowel sounds  Extremities: No clubbing, No cyanosis, No edema, No calf tenderness  Neuro: No tremors, Moves all extremities  Psychiatric: Somnolent

## 2018-01-11 NOTE — ED ADULT NURSE REASSESSMENT NOTE - CONDITION
Pt asleep on initial rounds. Maintained on 1:1 for safety. V/S 11/62 P58 R18 T98.3 Respirations even.  No difficulty noted at this time./unchanged

## 2018-01-11 NOTE — H&P ADULT - HISTORY OF PRESENT ILLNESS
67M presented from the rehabilitation facility for agitation. The patient was sedated on interview and history was supplemented through review of available documentation from the rehabilitation facility, emergency department staff, and behavioral health assessment. The patient was note to be agitated and belligerent at the rehabilitation facility. He was also noted to be combative. Most recently he had refused medications. There was no mention of fevers or dyspnea/cough. The patient was also noted to have been recently admitted for a urinary tract infection and has a chronic indwelling Hardin catheter for urinary retention. Further information was otherwise limited. The patient denied any chest pain, dyspnea, or abdominal pain but did admit to lack of appetite.

## 2018-01-11 NOTE — ED ADULT NURSE REASSESSMENT NOTE - CONDITION
deteriorated/Pt being transferred back to for admit to medical floor.  For renal  issues.   call placed to Sigifredo RAHMAN RN awaiting return call.

## 2018-01-11 NOTE — ED ADULT NURSE REASSESSMENT NOTE - COMFORT CARE
repositioned/side rails up/po fluids offered
repositioned
Hardin drained of  800cc
gama  empty and  passing urine

## 2018-01-11 NOTE — ED ADULT NURSE REASSESSMENT NOTE - GENERAL PATIENT STATE
cooperative/comfortable appearance/resting/sleeping
comfortable appearance/resting/sleeping
agitated/anxious
comfortable appearance
comfortable appearance

## 2018-01-12 DIAGNOSIS — F03.91 UNSPECIFIED DEMENTIA WITH BEHAVIORAL DISTURBANCE: ICD-10-CM

## 2018-01-12 LAB
-  AMIKACIN: SIGNIFICANT CHANGE UP
-  AMPICILLIN: SIGNIFICANT CHANGE UP
-  AZTREONAM: SIGNIFICANT CHANGE UP
-  CEFEPIME: SIGNIFICANT CHANGE UP
-  CEFTAZIDIME: SIGNIFICANT CHANGE UP
-  CIPROFLOXACIN: SIGNIFICANT CHANGE UP
-  GENTAMICIN: SIGNIFICANT CHANGE UP
-  IMIPENEM: SIGNIFICANT CHANGE UP
-  LEVOFLOXACIN: SIGNIFICANT CHANGE UP
-  MEROPENEM: SIGNIFICANT CHANGE UP
-  NITROFURANTOIN: SIGNIFICANT CHANGE UP
-  PIPERACILLIN/TAZOBACTAM: SIGNIFICANT CHANGE UP
-  TETRACYCLINE: SIGNIFICANT CHANGE UP
-  TOBRAMYCIN: SIGNIFICANT CHANGE UP
-  VANCOMYCIN: SIGNIFICANT CHANGE UP
ANION GAP SERPL CALC-SCNC: 12 MMOL/L — SIGNIFICANT CHANGE UP (ref 5–17)
BUN SERPL-MCNC: 24 MG/DL — HIGH (ref 8–20)
CALCIUM SERPL-MCNC: 9.9 MG/DL — SIGNIFICANT CHANGE UP (ref 8.6–10.2)
CHLORIDE SERPL-SCNC: 120 MMOL/L — HIGH (ref 98–107)
CO2 SERPL-SCNC: 19 MMOL/L — LOW (ref 22–29)
CREAT SERPL-MCNC: 2.06 MG/DL — HIGH (ref 0.5–1.3)
CULTURE RESULTS: SIGNIFICANT CHANGE UP
GLUCOSE BLDC GLUCOMTR-MCNC: 114 MG/DL — HIGH (ref 70–99)
GLUCOSE BLDC GLUCOMTR-MCNC: 117 MG/DL — HIGH (ref 70–99)
GLUCOSE BLDC GLUCOMTR-MCNC: 200 MG/DL — HIGH (ref 70–99)
GLUCOSE BLDC GLUCOMTR-MCNC: 97 MG/DL — SIGNIFICANT CHANGE UP (ref 70–99)
GLUCOSE SERPL-MCNC: 96 MG/DL — SIGNIFICANT CHANGE UP (ref 70–115)
HCT VFR BLD CALC: 32.4 % — LOW (ref 42–52)
HGB BLD-MCNC: 10 G/DL — LOW (ref 14–18)
MCHC RBC-ENTMCNC: 29.6 PG — SIGNIFICANT CHANGE UP (ref 27–31)
MCHC RBC-ENTMCNC: 30.9 G/DL — LOW (ref 32–36)
MCV RBC AUTO: 95.9 FL — HIGH (ref 80–94)
METHOD TYPE: SIGNIFICANT CHANGE UP
METHOD TYPE: SIGNIFICANT CHANGE UP
ORGANISM # SPEC MICROSCOPIC CNT: SIGNIFICANT CHANGE UP
PLATELET # BLD AUTO: 174 K/UL — SIGNIFICANT CHANGE UP (ref 150–400)
POTASSIUM SERPL-MCNC: 4.5 MMOL/L — SIGNIFICANT CHANGE UP (ref 3.5–5.3)
POTASSIUM SERPL-SCNC: 4.5 MMOL/L — SIGNIFICANT CHANGE UP (ref 3.5–5.3)
RBC # BLD: 3.38 M/UL — LOW (ref 4.6–6.2)
RBC # FLD: 16.4 % — HIGH (ref 11–15.6)
SODIUM SERPL-SCNC: 151 MMOL/L — HIGH (ref 135–145)
SPECIMEN SOURCE: SIGNIFICANT CHANGE UP
WBC # BLD: 7.9 K/UL — SIGNIFICANT CHANGE UP (ref 4.8–10.8)
WBC # FLD AUTO: 7.9 K/UL — SIGNIFICANT CHANGE UP (ref 4.8–10.8)

## 2018-01-12 PROCEDURE — 99232 SBSQ HOSP IP/OBS MODERATE 35: CPT

## 2018-01-12 PROCEDURE — 99233 SBSQ HOSP IP/OBS HIGH 50: CPT

## 2018-01-12 RX ORDER — CIPROFLOXACIN LACTATE 400MG/40ML
250 VIAL (ML) INTRAVENOUS EVERY 12 HOURS
Qty: 0 | Refills: 0 | Status: DISCONTINUED | OUTPATIENT
Start: 2018-01-12 | End: 2018-01-16

## 2018-01-12 RX ADMIN — QUETIAPINE FUMARATE 25 MILLIGRAM(S): 200 TABLET, FILM COATED ORAL at 06:09

## 2018-01-12 RX ADMIN — Medication 250 MILLIGRAM(S): at 18:47

## 2018-01-12 RX ADMIN — PANTOPRAZOLE SODIUM 40 MILLIGRAM(S): 20 TABLET, DELAYED RELEASE ORAL at 06:12

## 2018-01-12 RX ADMIN — Medication 1 GRAM(S): at 21:45

## 2018-01-12 RX ADMIN — Medication 50 MILLIGRAM(S): at 06:09

## 2018-01-12 RX ADMIN — Medication 1 GRAM(S): at 16:04

## 2018-01-12 RX ADMIN — DIVALPROEX SODIUM 250 MILLIGRAM(S): 500 TABLET, DELAYED RELEASE ORAL at 06:08

## 2018-01-12 RX ADMIN — Medication 2: at 11:37

## 2018-01-12 RX ADMIN — SODIUM CHLORIDE 100 MILLILITER(S): 9 INJECTION, SOLUTION INTRAVENOUS at 21:51

## 2018-01-12 RX ADMIN — Medication 1 GRAM(S): at 06:09

## 2018-01-12 RX ADMIN — INSULIN GLARGINE 22 UNIT(S): 100 INJECTION, SOLUTION SUBCUTANEOUS at 23:44

## 2018-01-12 RX ADMIN — CINACALCET 30 MILLIGRAM(S): 30 TABLET, FILM COATED ORAL at 06:11

## 2018-01-12 RX ADMIN — CINACALCET 30 MILLIGRAM(S): 30 TABLET, FILM COATED ORAL at 18:48

## 2018-01-12 RX ADMIN — SODIUM CHLORIDE 100 MILLILITER(S): 9 INJECTION, SOLUTION INTRAVENOUS at 16:06

## 2018-01-12 RX ADMIN — Medication 75 MICROGRAM(S): at 06:10

## 2018-01-12 RX ADMIN — DIVALPROEX SODIUM 250 MILLIGRAM(S): 500 TABLET, DELAYED RELEASE ORAL at 18:46

## 2018-01-12 RX ADMIN — ATORVASTATIN CALCIUM 40 MILLIGRAM(S): 80 TABLET, FILM COATED ORAL at 21:45

## 2018-01-12 RX ADMIN — QUETIAPINE FUMARATE 25 MILLIGRAM(S): 200 TABLET, FILM COATED ORAL at 18:48

## 2018-01-12 RX ADMIN — TAMSULOSIN HYDROCHLORIDE 0.4 MILLIGRAM(S): 0.4 CAPSULE ORAL at 21:45

## 2018-01-12 RX ADMIN — Medication 20 MILLIGRAM(S): at 06:09

## 2018-01-12 RX ADMIN — Medication 1 GRAM(S): at 11:37

## 2018-01-12 RX ADMIN — Medication 50 MILLIGRAM(S): at 14:19

## 2018-01-12 RX ADMIN — AMLODIPINE BESYLATE 10 MILLIGRAM(S): 2.5 TABLET ORAL at 06:10

## 2018-01-12 RX ADMIN — QUETIAPINE FUMARATE 100 MILLIGRAM(S): 200 TABLET, FILM COATED ORAL at 21:45

## 2018-01-12 RX ADMIN — Medication 20 MILLIGRAM(S): at 18:46

## 2018-01-12 RX ADMIN — Medication 50 MILLIGRAM(S): at 21:45

## 2018-01-12 NOTE — PROGRESS NOTE BEHAVIORAL HEALTH - SUMMARY
66 yo man history of dementia with aggresison sent from nursing home due to worsening aggression, found to have UTI and hypernatremia.   No aggression reported by staff, last received prn for agitation on Fidel 10    continue seroquel and depakote at current doses.  would do EKG to monitor Qtc   given improvement in aggression may not require inpatient psychiatry when medically cleared. 68 yo man history of bipolar disorder presenting with manic symptoms from nursing home found to have hypernatremia.   No aggression reported by staff, last received prn for agitation on Fidel 10    continue seroquel and depakote at current doses.  would do EKG to monitor Qtc   given improvement in manic and psychotic symptoms may not require inpatient psychiatry when medically cleared.

## 2018-01-12 NOTE — PROGRESS NOTE ADULT - SUBJECTIVE AND OBJECTIVE BOX
MASOUD ELLIS  ----------------------------------------  The patient was seen and evaluated for UTI.  The patient is in no acute distress.  Denied any chest pain, palpitations, dyspnea, or abdominal pain.  Reports some nausea which he has had for the past week, but tolerating oral intake.    Vital Signs Last 24 Hrs  T(C): 36.3 (2018 08:12), Max: 37.3 (2018 16:03)  T(F): 97.4 (2018 08:12), Max: 99.2 (2018 16:03)  HR: 76 (2018 08:12) (65 - 80)  BP: 142/76 (2018 08:12) (142/76 - 175/78)  BP(mean): --  RR: 18 (2018 08:12) (18 - 18)  SpO2: 96% (2018 08:12) (96% - 100%)    CAPILLARY BLOOD GLUCOSE  POCT Blood Glucose.: 200 mg/dL (2018 11:30)  POCT Blood Glucose.: 117 mg/dL (2018 07:56)  POCT Blood Glucose.: 114 mg/dL (2018 21:36)  POCT Blood Glucose.: 99 mg/dL (2018 17:40)    PHYSICAL EXAMINATION:  ----------------------------------------  General appearance: NAD, Awake, Alert  HEENT: NCAT, Conjunctiva clear, EOMI, Pupils reactive  Neck: Supple, No JVD, No tenderness  Lungs: Clear to auscultation, Breath sound equal bilaterally, No wheezes, No rales  Cardiovascular: S1S2, Regular rhythm  Abdomen: Soft, Nontender, Nondistended, No guarding/rebound, Positive bowel sounds  Extremities: No clubbing, No cyanosis, No edema, No calf tenderness  Neuro: Strength equal bilaterally, No tremors  Psychiatric: Appropriate mood, Normal affect    LABORATORY STUDIES:  ----------------------------------------             10.0   7.9   )-----------( 174      ( 2018 06:54 )             32.4     -12    151<H>  |  120<H>  |  24.0<H>  ----------------------------<  96  4.5   |  19.0<L>  |  2.06<H>    Ca    9.9      2018 06:52    TPro  6.0<L>  /  Alb  3.2<L>  /  TBili  0.5  /  DBili  x   /  AST  25  /  ALT  27  /  AlkPhos  90  01-10    LIVER FUNCTIONS - ( 10 Fidel 2018 12:42 )  Alb: 3.2 g/dL / Pro: 6.0 g/dL / ALK PHOS: 90 U/L / ALT: 27 U/L / AST: 25 U/L / GGT: x           PT/INR - ( 10 Fidel 2018 12:42 )   PT: 12.3 sec;   INR: 1.12 ratio         PTT - ( 10 Fidel 2018 12:42 )  PTT:30.7 sec          Urinalysis Basic - ( 10 Fidel 2018 18:33 )    Color: Yellow / Appearance: Clear / S.010 / pH: x  Gluc: x / Ketone: Negative  / Bili: Negative / Urobili: Negative mg/dL   Blood: x / Protein: 30 mg/dL / Nitrite: Positive   Leuk Esterase: Small / RBC: 6-10 /HPF / WBC 6-10   Sq Epi: x / Non Sq Epi: Occasional / Bacteria: Few    Culture - Urine (collected 10 Fidel 2018 18:34)  Source: .Urine Catheterized  Final Report (2018 08:55):    >100,000 CFU/ml Pseudomonas aeruginosa    50,000 - 99,000 CFU/mL Enterococcus faecalis  Organism: Pseudomonas aeruginosa  Enterococcus faecalis (2018 08:55)  Organism: Enterococcus faecalis (2018 08:55)  Organism: Pseudomonas aeruginosa (2018 08:55)    MEDICATIONS  (STANDING):  amLODIPine   Tablet 10 milliGRAM(s) Oral daily  atorvastatin 40 milliGRAM(s) Oral at bedtime  cinacalcet 30 milliGRAM(s) Oral two times a day  dextrose 5%. 1000 milliLiter(s) (50 mL/Hr) IV Continuous <Continuous>  dextrose 50% Injectable 12.5 Gram(s) IV Push once  dextrose 50% Injectable 25 Gram(s) IV Push once  dextrose 50% Injectable 25 Gram(s) IV Push once  diVALproex  milliGRAM(s) Oral two times a day  hydrALAZINE 50 milliGRAM(s) Oral every 8 hours  insulin glargine Injectable (LANTUS) 22 Unit(s) SubCutaneous at bedtime  insulin lispro (HumaLOG) corrective regimen sliding scale   SubCutaneous three times a day before meals  levothyroxine 75 MICROGram(s) Oral daily  pantoprazole    Tablet 40 milliGRAM(s) Oral before breakfast  propranolol 20 milliGRAM(s) Oral two times a day  QUEtiapine 100 milliGRAM(s) Oral at bedtime  QUEtiapine 25 milliGRAM(s) Oral two times a day  sodium chloride 0.45%. 1000 milliLiter(s) (100 mL/Hr) IV Continuous <Continuous>  sucralfate suspension 1 Gram(s) Oral four times a day before meals  tamsulosin 0.4 milliGRAM(s) Oral at bedtime    MEDICATIONS  (PRN):  dextrose Gel 1 Dose(s) Oral once PRN Blood Glucose LESS THAN 70 milliGRAM(s)/deciliter  glucagon  Injectable 1 milliGRAM(s) IntraMuscular once PRN Glucose LESS THAN 70 milligrams/deciliter  haloperidol    Injectable 2.5 milliGRAM(s) IntraMuscular every 6 hours PRN Agitation  LORazepam     Tablet 1 milliGRAM(s) Oral every 6 hours PRN Agitation      ASSESSMENT / PLAN:  ----------------------------------------  Hypernatremia - Intravenous fluids initiated. Repeat laboratory studies ordered to monitor.    Urinary tract infection - On antibiotics. Afebrile.    Urinary retention - Chronic Hardin catheter in place and draining clear urine. No signs of hematuria.    Bipolar disorder - Behavioral Health consultation noted. On quetiapine and divalproex, with lorazepam as needed.    Hypothyroidism - On levothyroxine.    Hypertension - Close blood pressure monitoring. On propranolol and amlodipine.    Diabetes - Insulin coverage, close monitoring of blood glucose levels.

## 2018-01-13 LAB
ANION GAP SERPL CALC-SCNC: 12 MMOL/L — SIGNIFICANT CHANGE UP (ref 5–17)
BUN SERPL-MCNC: 22 MG/DL — HIGH (ref 8–20)
CALCIUM SERPL-MCNC: 8.8 MG/DL — SIGNIFICANT CHANGE UP (ref 8.6–10.2)
CHLORIDE SERPL-SCNC: 118 MMOL/L — HIGH (ref 98–107)
CO2 SERPL-SCNC: 19 MMOL/L — LOW (ref 22–29)
CREAT SERPL-MCNC: 1.7 MG/DL — HIGH (ref 0.5–1.3)
GLUCOSE BLDC GLUCOMTR-MCNC: 118 MG/DL — HIGH (ref 70–99)
GLUCOSE BLDC GLUCOMTR-MCNC: 132 MG/DL — HIGH (ref 70–99)
GLUCOSE BLDC GLUCOMTR-MCNC: 211 MG/DL — HIGH (ref 70–99)
GLUCOSE BLDC GLUCOMTR-MCNC: 96 MG/DL — SIGNIFICANT CHANGE UP (ref 70–99)
GLUCOSE SERPL-MCNC: 91 MG/DL — SIGNIFICANT CHANGE UP (ref 70–115)
HCT VFR BLD CALC: 27.5 % — LOW (ref 42–52)
HGB BLD-MCNC: 8.5 G/DL — LOW (ref 14–18)
MCHC RBC-ENTMCNC: 29.5 PG — SIGNIFICANT CHANGE UP (ref 27–31)
MCHC RBC-ENTMCNC: 30.9 G/DL — LOW (ref 32–36)
MCV RBC AUTO: 95.5 FL — HIGH (ref 80–94)
PLATELET # BLD AUTO: 172 K/UL — SIGNIFICANT CHANGE UP (ref 150–400)
POTASSIUM SERPL-MCNC: 3.8 MMOL/L — SIGNIFICANT CHANGE UP (ref 3.5–5.3)
POTASSIUM SERPL-SCNC: 3.8 MMOL/L — SIGNIFICANT CHANGE UP (ref 3.5–5.3)
RBC # BLD: 2.88 M/UL — LOW (ref 4.6–6.2)
RBC # FLD: 15.9 % — HIGH (ref 11–15.6)
SODIUM SERPL-SCNC: 149 MMOL/L — HIGH (ref 135–145)
WBC # BLD: 5.2 K/UL — SIGNIFICANT CHANGE UP (ref 4.8–10.8)
WBC # FLD AUTO: 5.2 K/UL — SIGNIFICANT CHANGE UP (ref 4.8–10.8)

## 2018-01-13 PROCEDURE — 99233 SBSQ HOSP IP/OBS HIGH 50: CPT

## 2018-01-13 RX ADMIN — ATORVASTATIN CALCIUM 40 MILLIGRAM(S): 80 TABLET, FILM COATED ORAL at 21:04

## 2018-01-13 RX ADMIN — CINACALCET 30 MILLIGRAM(S): 30 TABLET, FILM COATED ORAL at 17:07

## 2018-01-13 RX ADMIN — PANTOPRAZOLE SODIUM 40 MILLIGRAM(S): 20 TABLET, DELAYED RELEASE ORAL at 05:21

## 2018-01-13 RX ADMIN — Medication 250 MILLIGRAM(S): at 17:07

## 2018-01-13 RX ADMIN — QUETIAPINE FUMARATE 25 MILLIGRAM(S): 200 TABLET, FILM COATED ORAL at 05:21

## 2018-01-13 RX ADMIN — INSULIN GLARGINE 22 UNIT(S): 100 INJECTION, SOLUTION SUBCUTANEOUS at 21:06

## 2018-01-13 RX ADMIN — Medication 50 MILLIGRAM(S): at 05:20

## 2018-01-13 RX ADMIN — Medication 20 MILLIGRAM(S): at 05:20

## 2018-01-13 RX ADMIN — Medication 75 MICROGRAM(S): at 05:20

## 2018-01-13 RX ADMIN — DIVALPROEX SODIUM 250 MILLIGRAM(S): 500 TABLET, DELAYED RELEASE ORAL at 05:20

## 2018-01-13 RX ADMIN — Medication 1 GRAM(S): at 17:07

## 2018-01-13 RX ADMIN — SODIUM CHLORIDE 100 MILLILITER(S): 9 INJECTION, SOLUTION INTRAVENOUS at 06:05

## 2018-01-13 RX ADMIN — Medication 1 MILLIGRAM(S): at 21:04

## 2018-01-13 RX ADMIN — DIVALPROEX SODIUM 250 MILLIGRAM(S): 500 TABLET, DELAYED RELEASE ORAL at 17:07

## 2018-01-13 RX ADMIN — Medication 20 MILLIGRAM(S): at 17:07

## 2018-01-13 RX ADMIN — Medication 1 GRAM(S): at 05:21

## 2018-01-13 RX ADMIN — CINACALCET 30 MILLIGRAM(S): 30 TABLET, FILM COATED ORAL at 05:20

## 2018-01-13 RX ADMIN — Medication 1 GRAM(S): at 21:03

## 2018-01-13 RX ADMIN — QUETIAPINE FUMARATE 25 MILLIGRAM(S): 200 TABLET, FILM COATED ORAL at 17:07

## 2018-01-13 RX ADMIN — QUETIAPINE FUMARATE 100 MILLIGRAM(S): 200 TABLET, FILM COATED ORAL at 21:04

## 2018-01-13 RX ADMIN — Medication 50 MILLIGRAM(S): at 13:31

## 2018-01-13 RX ADMIN — Medication 1 GRAM(S): at 12:04

## 2018-01-13 RX ADMIN — TAMSULOSIN HYDROCHLORIDE 0.4 MILLIGRAM(S): 0.4 CAPSULE ORAL at 21:04

## 2018-01-13 RX ADMIN — AMLODIPINE BESYLATE 10 MILLIGRAM(S): 2.5 TABLET ORAL at 05:20

## 2018-01-13 RX ADMIN — Medication 250 MILLIGRAM(S): at 05:22

## 2018-01-13 RX ADMIN — Medication 50 MILLIGRAM(S): at 21:03

## 2018-01-13 NOTE — PROGRESS NOTE ADULT - SUBJECTIVE AND OBJECTIVE BOX
MASOUD ELLIS  ----------------------------------------  The patient was seen and evaluated for hypernatremia.  The patient is in no acute distress.  Denied any chest pain, palpitations, dyspnea, or abdominal pain.    Vital Signs Last 24 Hrs  T(C): 36.6 (13 Jan 2018 07:30), Max: 36.8 (13 Jan 2018 04:39)  T(F): 97.8 (13 Jan 2018 07:30), Max: 98.3 (13 Jan 2018 04:39)  HR: 65 (13 Jan 2018 07:30) (65 - 83)  BP: 120/67 (13 Jan 2018 07:30) (120/67 - 166/85)  BP(mean): --  RR: 17 (13 Jan 2018 07:30) (16 - 18)  SpO2: 97% (13 Jan 2018 07:30) (97% - 100%)    CAPILLARY BLOOD GLUCOSE  POCT Blood Glucose.: 96 mg/dL (13 Jan 2018 07:44)  POCT Blood Glucose.: 114 mg/dL (12 Jan 2018 20:53)  POCT Blood Glucose.: 97 mg/dL (12 Jan 2018 16:41)  POCT Blood Glucose.: 200 mg/dL (12 Jan 2018 11:30)    PHYSICAL EXAMINATION:  ----------------------------------------  General appearance: NAD, Awake, Alert  HEENT: NCAT, Conjunctiva clear, EOMI  Neck: Supple, No JVD, No tenderness  Lungs: Clear to auscultation, Breath sound equal bilaterally, No wheezes, No rales  Cardiovascular: S1S2, Regular rhythm  Abdomen: Soft, Nontender, Nondistended, No guarding/rebound, Positive bowel sounds  Extremities: No clubbing, No cyanosis, No edema, No calf tenderness  Neuro: Strength equal bilaterally, No tremors  Psychiatric: Appropriate mood, Normal affect    LABORATORY STUDIES:  ----------------------------------------             8.5    5.2   )-----------( 172      ( 13 Jan 2018 08:06 )             27.5     01-13    149<H>  |  118<H>  |  22.0<H>  ----------------------------<  91  3.8   |  19.0<L>  |  1.70<H>    Ca    8.8      13 Jan 2018 08:06    Culture - Urine (collected 10 Fidel 2018 18:34)  Source: .Urine Catheterized  Final Report (12 Jan 2018 08:55):    >100,000 CFU/ml Pseudomonas aeruginosa    50,000 - 99,000 CFU/mL Enterococcus faecalis  Organism: Pseudomonas aeruginosa  Enterococcus faecalis (12 Jan 2018 08:55)  Organism: Enterococcus faecalis (12 Jan 2018 08:55)  Organism: Pseudomonas aeruginosa (12 Jan 2018 08:55)    MEDICATIONS  (STANDING):  amLODIPine   Tablet 10 milliGRAM(s) Oral daily  atorvastatin 40 milliGRAM(s) Oral at bedtime  cinacalcet 30 milliGRAM(s) Oral two times a day  ciprofloxacin     Tablet 250 milliGRAM(s) Oral every 12 hours  dextrose 5%. 1000 milliLiter(s) (50 mL/Hr) IV Continuous <Continuous>  dextrose 50% Injectable 12.5 Gram(s) IV Push once  dextrose 50% Injectable 25 Gram(s) IV Push once  dextrose 50% Injectable 25 Gram(s) IV Push once  diVALproex  milliGRAM(s) Oral two times a day  hydrALAZINE 50 milliGRAM(s) Oral every 8 hours  insulin glargine Injectable (LANTUS) 22 Unit(s) SubCutaneous at bedtime  insulin lispro (HumaLOG) corrective regimen sliding scale   SubCutaneous three times a day before meals  levothyroxine 75 MICROGram(s) Oral daily  pantoprazole    Tablet 40 milliGRAM(s) Oral before breakfast  propranolol 20 milliGRAM(s) Oral two times a day  QUEtiapine 100 milliGRAM(s) Oral at bedtime  QUEtiapine 25 milliGRAM(s) Oral two times a day  sodium chloride 0.45%. 1000 milliLiter(s) (100 mL/Hr) IV Continuous <Continuous>  sucralfate suspension 1 Gram(s) Oral four times a day before meals  tamsulosin 0.4 milliGRAM(s) Oral at bedtime    MEDICATIONS  (PRN):  dextrose Gel 1 Dose(s) Oral once PRN Blood Glucose LESS THAN 70 milliGRAM(s)/deciliter  glucagon  Injectable 1 milliGRAM(s) IntraMuscular once PRN Glucose LESS THAN 70 milligrams/deciliter  haloperidol    Injectable 2.5 milliGRAM(s) IntraMuscular every 6 hours PRN Agitation  LORazepam     Tablet 1 milliGRAM(s) Oral every 6 hours PRN Agitation      ASSESSMENT / PLAN:  ----------------------------------------  Hypernatremia - Intravenous fluids initiated. Repeat laboratory studies ordered to monitor.    Urinary tract infection - On antibiotics. Afebrile.    Urinary retention - Chronic Hardin catheter in place and draining clear urine. No signs of hematuria.    Bipolar disorder - Behavioral Health consultation noted. On quetiapine and divalproex, with lorazepam as needed.    Hypothyroidism - On levothyroxine.    Hypertension - Close blood pressure monitoring. On propranolol and amlodipine.    Diabetes - Insulin coverage, close monitoring of blood glucose levels. MASOUD ELLIS  ----------------------------------------  The patient was seen and evaluated for hypernatremia.  The patient is in no acute distress.  Denied any chest pain, palpitations, dyspnea, or abdominal pain.    PHI:  67M presented from the rehabilitation facility for agitation and combative behavior. On presentation, WBC(10.6), H/H(9.5/30.7), Na(149), BUN/Cr(27/1.9). Urinalysis was noted to be abnormal and antibiotics were initiated for urinary tract infection. The patient was seen by Psychiatry in consultation and thought to benefit from inpatient psychiatric treatment once medically stable. Repeat laboratory results noted worsening hypernatremia and hypotonic intravenous fluids was initiated.    Vital Signs Last 24 Hrs  T(C): 36.6 (13 Jan 2018 07:30), Max: 36.8 (13 Jan 2018 04:39)  T(F): 97.8 (13 Jan 2018 07:30), Max: 98.3 (13 Jan 2018 04:39)  HR: 65 (13 Jan 2018 07:30) (65 - 83)  BP: 120/67 (13 Jan 2018 07:30) (120/67 - 166/85)  BP(mean): --  RR: 17 (13 Jan 2018 07:30) (16 - 18)  SpO2: 97% (13 Jan 2018 07:30) (97% - 100%)    CAPILLARY BLOOD GLUCOSE  POCT Blood Glucose.: 96 mg/dL (13 Jan 2018 07:44)  POCT Blood Glucose.: 114 mg/dL (12 Jan 2018 20:53)  POCT Blood Glucose.: 97 mg/dL (12 Jan 2018 16:41)  POCT Blood Glucose.: 200 mg/dL (12 Jan 2018 11:30)    PHYSICAL EXAMINATION:  ----------------------------------------  General appearance: NAD, Awake, Alert  HEENT: NCAT, Conjunctiva clear, EOMI  Neck: Supple, No JVD, No tenderness  Lungs: Clear to auscultation, Breath sound equal bilaterally, No wheezes, No rales  Cardiovascular: S1S2, Regular rhythm  Abdomen: Soft, Nontender, Nondistended, No guarding/rebound, Positive bowel sounds  Extremities: No clubbing, No cyanosis, No edema, No calf tenderness  Neuro: Strength equal bilaterally, No tremors  Psychiatric: Appropriate mood, Normal affect    LABORATORY STUDIES:  ----------------------------------------             8.5    5.2   )-----------( 172      ( 13 Jan 2018 08:06 )             27.5     01-13    149<H>  |  118<H>  |  22.0<H>  ----------------------------<  91  3.8   |  19.0<L>  |  1.70<H>    Ca    8.8      13 Jan 2018 08:06    Culture - Urine (collected 10 Fidel 2018 18:34)  Source: .Urine Catheterized  Final Report (12 Jan 2018 08:55):    >100,000 CFU/ml Pseudomonas aeruginosa    50,000 - 99,000 CFU/mL Enterococcus faecalis  Organism: Pseudomonas aeruginosa  Enterococcus faecalis (12 Jan 2018 08:55)  Organism: Enterococcus faecalis (12 Jan 2018 08:55)  Organism: Pseudomonas aeruginosa (12 Jan 2018 08:55)    MEDICATIONS  (STANDING):  amLODIPine   Tablet 10 milliGRAM(s) Oral daily  atorvastatin 40 milliGRAM(s) Oral at bedtime  cinacalcet 30 milliGRAM(s) Oral two times a day  ciprofloxacin     Tablet 250 milliGRAM(s) Oral every 12 hours  dextrose 5%. 1000 milliLiter(s) (50 mL/Hr) IV Continuous <Continuous>  dextrose 50% Injectable 12.5 Gram(s) IV Push once  dextrose 50% Injectable 25 Gram(s) IV Push once  dextrose 50% Injectable 25 Gram(s) IV Push once  diVALproex  milliGRAM(s) Oral two times a day  hydrALAZINE 50 milliGRAM(s) Oral every 8 hours  insulin glargine Injectable (LANTUS) 22 Unit(s) SubCutaneous at bedtime  insulin lispro (HumaLOG) corrective regimen sliding scale   SubCutaneous three times a day before meals  levothyroxine 75 MICROGram(s) Oral daily  pantoprazole    Tablet 40 milliGRAM(s) Oral before breakfast  propranolol 20 milliGRAM(s) Oral two times a day  QUEtiapine 100 milliGRAM(s) Oral at bedtime  QUEtiapine 25 milliGRAM(s) Oral two times a day  sodium chloride 0.45%. 1000 milliLiter(s) (100 mL/Hr) IV Continuous <Continuous>  sucralfate suspension 1 Gram(s) Oral four times a day before meals  tamsulosin 0.4 milliGRAM(s) Oral at bedtime    MEDICATIONS  (PRN):  dextrose Gel 1 Dose(s) Oral once PRN Blood Glucose LESS THAN 70 milliGRAM(s)/deciliter  glucagon  Injectable 1 milliGRAM(s) IntraMuscular once PRN Glucose LESS THAN 70 milligrams/deciliter  haloperidol    Injectable 2.5 milliGRAM(s) IntraMuscular every 6 hours PRN Agitation  LORazepam     Tablet 1 milliGRAM(s) Oral every 6 hours PRN Agitation      ASSESSMENT / PLAN:  ----------------------------------------  Hypernatremia - Intravenous fluids initiated. Repeat laboratory studies ordered to monitor.    Urinary tract infection - On antibiotics. Afebrile.    Urinary retention - Chronic Hardin catheter in place and draining clear urine. No signs of hematuria.    Bipolar disorder - Behavioral Health consultation noted. On quetiapine and divalproex, with lorazepam as needed.    Hypothyroidism - On levothyroxine.    Hypertension - Close blood pressure monitoring. On propranolol and amlodipine.    Diabetes - Insulin coverage, close monitoring of blood glucose levels. MASOUD ELLIS  ----------------------------------------  The patient was seen and evaluated for hypernatremia.  The patient is in no acute distress.  Denied any chest pain, palpitations, dyspnea, or abdominal pain.    HPI:  67M presented from the rehabilitation facility for agitation and combative behavior. On presentation, WBC(10.6), H/H(9.5/30.7), Na(149), BUN/Cr(27/1.9). Urinalysis was noted to be abnormal and antibiotics were initiated for urinary tract infection. The patient was seen by Psychiatry in consultation and thought to benefit from inpatient psychiatric treatment once medically stable. Repeat laboratory results noted worsening hypernatremia and hypotonic intravenous fluids was initiated.    Vital Signs Last 24 Hrs  T(C): 36.6 (13 Jan 2018 07:30), Max: 36.8 (13 Jan 2018 04:39)  T(F): 97.8 (13 Jan 2018 07:30), Max: 98.3 (13 Jan 2018 04:39)  HR: 65 (13 Jan 2018 07:30) (65 - 83)  BP: 120/67 (13 Jan 2018 07:30) (120/67 - 166/85)  BP(mean): --  RR: 17 (13 Jan 2018 07:30) (16 - 18)  SpO2: 97% (13 Jan 2018 07:30) (97% - 100%)    CAPILLARY BLOOD GLUCOSE  POCT Blood Glucose.: 96 mg/dL (13 Jan 2018 07:44)  POCT Blood Glucose.: 114 mg/dL (12 Jan 2018 20:53)  POCT Blood Glucose.: 97 mg/dL (12 Jan 2018 16:41)  POCT Blood Glucose.: 200 mg/dL (12 Jan 2018 11:30)    PHYSICAL EXAMINATION:  ----------------------------------------  General appearance: NAD, Awake, Alert  HEENT: NCAT, Conjunctiva clear, EOMI  Neck: Supple, No JVD, No tenderness  Lungs: Clear to auscultation, Breath sound equal bilaterally, No wheezes, No rales  Cardiovascular: S1S2, Regular rhythm  Abdomen: Soft, Nontender, Nondistended, No guarding/rebound, Positive bowel sounds  Extremities: No clubbing, No cyanosis, No edema, No calf tenderness  Neuro: Strength equal bilaterally, No tremors  Psychiatric: Appropriate mood, Normal affect    LABORATORY STUDIES:  ----------------------------------------             8.5    5.2   )-----------( 172      ( 13 Jan 2018 08:06 )             27.5     01-13    149<H>  |  118<H>  |  22.0<H>  ----------------------------<  91  3.8   |  19.0<L>  |  1.70<H>    Ca    8.8      13 Jan 2018 08:06    Culture - Urine (collected 10 Fidel 2018 18:34)  Source: .Urine Catheterized  Final Report (12 Jan 2018 08:55):    >100,000 CFU/ml Pseudomonas aeruginosa    50,000 - 99,000 CFU/mL Enterococcus faecalis  Organism: Pseudomonas aeruginosa  Enterococcus faecalis (12 Jan 2018 08:55)  Organism: Enterococcus faecalis (12 Jan 2018 08:55)  Organism: Pseudomonas aeruginosa (12 Jan 2018 08:55)    MEDICATIONS  (STANDING):  amLODIPine   Tablet 10 milliGRAM(s) Oral daily  atorvastatin 40 milliGRAM(s) Oral at bedtime  cinacalcet 30 milliGRAM(s) Oral two times a day  ciprofloxacin     Tablet 250 milliGRAM(s) Oral every 12 hours  dextrose 5%. 1000 milliLiter(s) (50 mL/Hr) IV Continuous <Continuous>  dextrose 50% Injectable 12.5 Gram(s) IV Push once  dextrose 50% Injectable 25 Gram(s) IV Push once  dextrose 50% Injectable 25 Gram(s) IV Push once  diVALproex  milliGRAM(s) Oral two times a day  hydrALAZINE 50 milliGRAM(s) Oral every 8 hours  insulin glargine Injectable (LANTUS) 22 Unit(s) SubCutaneous at bedtime  insulin lispro (HumaLOG) corrective regimen sliding scale   SubCutaneous three times a day before meals  levothyroxine 75 MICROGram(s) Oral daily  pantoprazole    Tablet 40 milliGRAM(s) Oral before breakfast  propranolol 20 milliGRAM(s) Oral two times a day  QUEtiapine 100 milliGRAM(s) Oral at bedtime  QUEtiapine 25 milliGRAM(s) Oral two times a day  sodium chloride 0.45%. 1000 milliLiter(s) (100 mL/Hr) IV Continuous <Continuous>  sucralfate suspension 1 Gram(s) Oral four times a day before meals  tamsulosin 0.4 milliGRAM(s) Oral at bedtime    MEDICATIONS  (PRN):  dextrose Gel 1 Dose(s) Oral once PRN Blood Glucose LESS THAN 70 milliGRAM(s)/deciliter  glucagon  Injectable 1 milliGRAM(s) IntraMuscular once PRN Glucose LESS THAN 70 milligrams/deciliter  haloperidol    Injectable 2.5 milliGRAM(s) IntraMuscular every 6 hours PRN Agitation  LORazepam     Tablet 1 milliGRAM(s) Oral every 6 hours PRN Agitation      ASSESSMENT / PLAN:  ----------------------------------------  Hypernatremia - Intravenous fluids initiated. Repeat laboratory studies ordered to monitor.    Urinary tract infection - On antibiotics. Afebrile.    Urinary retention - Chronic Hardin catheter in place and draining clear urine. No signs of hematuria.    Bipolar disorder - Behavioral Health consultation noted. On quetiapine and divalproex, with lorazepam as needed.    Hypothyroidism - On levothyroxine.    Hypertension - Close blood pressure monitoring. On propranolol and amlodipine.    Diabetes - Insulin coverage, close monitoring of blood glucose levels.

## 2018-01-14 LAB
ANION GAP SERPL CALC-SCNC: 13 MMOL/L — SIGNIFICANT CHANGE UP (ref 5–17)
BUN SERPL-MCNC: 20 MG/DL — SIGNIFICANT CHANGE UP (ref 8–20)
CALCIUM SERPL-MCNC: 8.6 MG/DL — SIGNIFICANT CHANGE UP (ref 8.6–10.2)
CHLORIDE SERPL-SCNC: 112 MMOL/L — HIGH (ref 98–107)
CO2 SERPL-SCNC: 18 MMOL/L — LOW (ref 22–29)
CREAT SERPL-MCNC: 1.66 MG/DL — HIGH (ref 0.5–1.3)
GLUCOSE BLDC GLUCOMTR-MCNC: 110 MG/DL — HIGH (ref 70–99)
GLUCOSE BLDC GLUCOMTR-MCNC: 116 MG/DL — HIGH (ref 70–99)
GLUCOSE BLDC GLUCOMTR-MCNC: 140 MG/DL — HIGH (ref 70–99)
GLUCOSE BLDC GLUCOMTR-MCNC: 159 MG/DL — HIGH (ref 70–99)
GLUCOSE SERPL-MCNC: 97 MG/DL — SIGNIFICANT CHANGE UP (ref 70–115)
HCT VFR BLD CALC: 27.8 % — LOW (ref 42–52)
HGB BLD-MCNC: 8.8 G/DL — LOW (ref 14–18)
MCHC RBC-ENTMCNC: 29.3 PG — SIGNIFICANT CHANGE UP (ref 27–31)
MCHC RBC-ENTMCNC: 31.7 G/DL — LOW (ref 32–36)
MCV RBC AUTO: 92.7 FL — SIGNIFICANT CHANGE UP (ref 80–94)
PLATELET # BLD AUTO: 156 K/UL — SIGNIFICANT CHANGE UP (ref 150–400)
POTASSIUM SERPL-MCNC: 3.6 MMOL/L — SIGNIFICANT CHANGE UP (ref 3.5–5.3)
POTASSIUM SERPL-SCNC: 3.6 MMOL/L — SIGNIFICANT CHANGE UP (ref 3.5–5.3)
RBC # BLD: 3 M/UL — LOW (ref 4.6–6.2)
RBC # FLD: 15.6 % — SIGNIFICANT CHANGE UP (ref 11–15.6)
SODIUM SERPL-SCNC: 143 MMOL/L — SIGNIFICANT CHANGE UP (ref 135–145)
VALPROATE SERPL-MCNC: 13.7 UG/ML — LOW (ref 50–100)
WBC # BLD: 5.4 K/UL — SIGNIFICANT CHANGE UP (ref 4.8–10.8)
WBC # FLD AUTO: 5.4 K/UL — SIGNIFICANT CHANGE UP (ref 4.8–10.8)

## 2018-01-14 PROCEDURE — 93010 ELECTROCARDIOGRAM REPORT: CPT

## 2018-01-14 PROCEDURE — 99233 SBSQ HOSP IP/OBS HIGH 50: CPT

## 2018-01-14 RX ORDER — HYDRALAZINE HCL 50 MG
10 TABLET ORAL EVERY 6 HOURS
Qty: 0 | Refills: 0 | Status: DISCONTINUED | OUTPATIENT
Start: 2018-01-14 | End: 2018-01-20

## 2018-01-14 RX ORDER — ENOXAPARIN SODIUM 100 MG/ML
40 INJECTION SUBCUTANEOUS DAILY
Qty: 0 | Refills: 0 | Status: DISCONTINUED | OUTPATIENT
Start: 2018-01-14 | End: 2018-01-20

## 2018-01-14 RX ADMIN — DIVALPROEX SODIUM 250 MILLIGRAM(S): 500 TABLET, DELAYED RELEASE ORAL at 18:36

## 2018-01-14 RX ADMIN — Medication 75 MICROGRAM(S): at 05:12

## 2018-01-14 RX ADMIN — Medication 2: at 17:24

## 2018-01-14 RX ADMIN — Medication 1 GRAM(S): at 05:11

## 2018-01-14 RX ADMIN — CINACALCET 30 MILLIGRAM(S): 30 TABLET, FILM COATED ORAL at 18:36

## 2018-01-14 RX ADMIN — PANTOPRAZOLE SODIUM 40 MILLIGRAM(S): 20 TABLET, DELAYED RELEASE ORAL at 05:12

## 2018-01-14 RX ADMIN — AMLODIPINE BESYLATE 10 MILLIGRAM(S): 2.5 TABLET ORAL at 05:12

## 2018-01-14 RX ADMIN — ATORVASTATIN CALCIUM 40 MILLIGRAM(S): 80 TABLET, FILM COATED ORAL at 21:09

## 2018-01-14 RX ADMIN — TAMSULOSIN HYDROCHLORIDE 0.4 MILLIGRAM(S): 0.4 CAPSULE ORAL at 21:10

## 2018-01-14 RX ADMIN — CINACALCET 30 MILLIGRAM(S): 30 TABLET, FILM COATED ORAL at 05:11

## 2018-01-14 RX ADMIN — Medication 50 MILLIGRAM(S): at 21:09

## 2018-01-14 RX ADMIN — QUETIAPINE FUMARATE 100 MILLIGRAM(S): 200 TABLET, FILM COATED ORAL at 21:09

## 2018-01-14 RX ADMIN — Medication 50 MILLIGRAM(S): at 14:07

## 2018-01-14 RX ADMIN — HALOPERIDOL DECANOATE 2.5 MILLIGRAM(S): 100 INJECTION INTRAMUSCULAR at 07:46

## 2018-01-14 RX ADMIN — INSULIN GLARGINE 22 UNIT(S): 100 INJECTION, SOLUTION SUBCUTANEOUS at 21:10

## 2018-01-14 RX ADMIN — DIVALPROEX SODIUM 250 MILLIGRAM(S): 500 TABLET, DELAYED RELEASE ORAL at 05:12

## 2018-01-14 RX ADMIN — QUETIAPINE FUMARATE 25 MILLIGRAM(S): 200 TABLET, FILM COATED ORAL at 18:35

## 2018-01-14 RX ADMIN — Medication 1 GRAM(S): at 21:10

## 2018-01-14 RX ADMIN — Medication 20 MILLIGRAM(S): at 18:35

## 2018-01-14 RX ADMIN — Medication 50 MILLIGRAM(S): at 05:12

## 2018-01-14 RX ADMIN — Medication 250 MILLIGRAM(S): at 05:12

## 2018-01-14 RX ADMIN — Medication 250 MILLIGRAM(S): at 18:36

## 2018-01-14 RX ADMIN — Medication 20 MILLIGRAM(S): at 05:11

## 2018-01-14 RX ADMIN — QUETIAPINE FUMARATE 25 MILLIGRAM(S): 200 TABLET, FILM COATED ORAL at 05:12

## 2018-01-14 NOTE — PROGRESS NOTE ADULT - SUBJECTIVE AND OBJECTIVE BOX
CHIEF COMPLAINT/INTERVAL HISTORY:    Patient is a 67y old  Male who presents with a chief complaint of Agitation (11 Jan 2018 10:01)      HPI:  67M presented from the rehabilitation facility for agitation. The patient was sedated on interview and history was supplemented through review of available documentation from the rehabilitation facility, emergency department staff, and behavioral health assessment. The patient was note to be agitated and belligerent at the rehabilitation facility. He was also noted to be combative. Most recently he had refused medications. There was no mention of fevers or dyspnea/cough. The patient was also noted to have been recently admitted for a urinary tract infection and has a chronic indwelling Hardin catheter for urinary retention. Further information was otherwise limited. The patient denied any chest pain, dyspnea, or abdominal pain but did admit to lack of appetite. (11 Jan 2018 10:01)      SUBJECTIVE & OBJECTIVE/ ROS: Pt seen and examined at bedside. Pt had episodes of agitation last night and this am when he required haldol IM as per RN. No other issues reported. Pt resting comfortably currently.      ICU Vital Signs Last 24 Hrs  T(C): 36.5 (14 Jan 2018 15:33), Max: 36.9 (13 Jan 2018 19:56)  T(F): 97.7 (14 Jan 2018 15:33), Max: 98.5 (13 Jan 2018 19:56)  HR: 91 (14 Jan 2018 15:33) (62 - 91)  BP: 161/84 (14 Jan 2018 15:33) (142/69 - 161/84)  BP(mean): --  ABP: --  ABP(mean): --  RR: 17 (14 Jan 2018 09:00) (17 - 18)  SpO2: 100% (14 Jan 2018 15:33) (98% - 100%)        MEDICATIONS  (STANDING):  amLODIPine   Tablet 10 milliGRAM(s) Oral daily  atorvastatin 40 milliGRAM(s) Oral at bedtime  cinacalcet 30 milliGRAM(s) Oral two times a day  ciprofloxacin     Tablet 250 milliGRAM(s) Oral every 12 hours  dextrose 5%. 1000 milliLiter(s) (50 mL/Hr) IV Continuous <Continuous>  dextrose 50% Injectable 12.5 Gram(s) IV Push once  dextrose 50% Injectable 25 Gram(s) IV Push once  dextrose 50% Injectable 25 Gram(s) IV Push once  diVALproex  milliGRAM(s) Oral two times a day  hydrALAZINE 50 milliGRAM(s) Oral every 8 hours  insulin glargine Injectable (LANTUS) 22 Unit(s) SubCutaneous at bedtime  insulin lispro (HumaLOG) corrective regimen sliding scale   SubCutaneous three times a day before meals  levothyroxine 75 MICROGram(s) Oral daily  pantoprazole    Tablet 40 milliGRAM(s) Oral before breakfast  propranolol 20 milliGRAM(s) Oral two times a day  QUEtiapine 100 milliGRAM(s) Oral at bedtime  QUEtiapine 25 milliGRAM(s) Oral two times a day  sodium chloride 0.45%. 1000 milliLiter(s) (100 mL/Hr) IV Continuous <Continuous>  sucralfate suspension 1 Gram(s) Oral four times a day before meals  tamsulosin 0.4 milliGRAM(s) Oral at bedtime    MEDICATIONS  (PRN):  dextrose Gel 1 Dose(s) Oral once PRN Blood Glucose LESS THAN 70 milliGRAM(s)/deciliter  glucagon  Injectable 1 milliGRAM(s) IntraMuscular once PRN Glucose LESS THAN 70 milligrams/deciliter  haloperidol    Injectable 2.5 milliGRAM(s) IntraMuscular every 6 hours PRN Agitation  hydrALAZINE Injectable 10 milliGRAM(s) IV Push every 6 hours PRN for SBP > 150  LORazepam     Tablet 1 milliGRAM(s) Oral every 6 hours PRN Agitation      LABS:                        8.8    5.4   )-----------( 156      ( 14 Jan 2018 10:51 )             27.8     01-14    143  |  112<H>  |  20.0  ----------------------------<  97  3.6   |  18.0<L>  |  1.66<H>    Ca    8.6      14 Jan 2018 10:51            CAPILLARY BLOOD GLUCOSE      POCT Blood Glucose.: 159 mg/dL (14 Jan 2018 16:34)  POCT Blood Glucose.: 110 mg/dL (14 Jan 2018 11:32)  POCT Blood Glucose.: 116 mg/dL (14 Jan 2018 07:38)  POCT Blood Glucose.: 211 mg/dL (13 Jan 2018 21:05)      RECENT CULTURES:      RADIOLOGY & ADDITIONAL TESTS:      PHYSICAL EXAM:    General appearance: NAD, Awake, Alert x 2  HEENT: NCAT, Conjunctiva clear, EOMI  Neck: Supple, No JVD, No tenderness  Lungs: Clear to auscultation, Breath sound equal bilaterally, No wheezes, No rales  Cardiovascular: S1S2, Regular rhythm  Abdomen: Soft, Nontender, Nondistended, No guarding/rebound, Positive bowel sounds  Extremities: No clubbing, No cyanosis, No edema, No calf tenderness  Neuro: Strength equal bilaterally, No tremors  Psychiatric: Appropriate mood, Normal affect  + Chronic Hardin with clear urine

## 2018-01-15 DIAGNOSIS — F05 DELIRIUM DUE TO KNOWN PHYSIOLOGICAL CONDITION: ICD-10-CM

## 2018-01-15 LAB
ANION GAP SERPL CALC-SCNC: 11 MMOL/L — SIGNIFICANT CHANGE UP (ref 5–17)
BASOPHILS # BLD AUTO: 0 K/UL — SIGNIFICANT CHANGE UP (ref 0–0.2)
BASOPHILS NFR BLD AUTO: 0.5 % — SIGNIFICANT CHANGE UP (ref 0–2)
BUN SERPL-MCNC: 21 MG/DL — HIGH (ref 8–20)
CALCIUM SERPL-MCNC: 8.8 MG/DL — SIGNIFICANT CHANGE UP (ref 8.6–10.2)
CHLORIDE SERPL-SCNC: 118 MMOL/L — HIGH (ref 98–107)
CO2 SERPL-SCNC: 20 MMOL/L — LOW (ref 22–29)
CREAT SERPL-MCNC: 1.53 MG/DL — HIGH (ref 0.5–1.3)
EOSINOPHIL # BLD AUTO: 0.2 K/UL — SIGNIFICANT CHANGE UP (ref 0–0.5)
EOSINOPHIL NFR BLD AUTO: 4.4 % — SIGNIFICANT CHANGE UP (ref 0–5)
GLUCOSE BLDC GLUCOMTR-MCNC: 113 MG/DL — HIGH (ref 70–99)
GLUCOSE BLDC GLUCOMTR-MCNC: 113 MG/DL — HIGH (ref 70–99)
GLUCOSE BLDC GLUCOMTR-MCNC: 127 MG/DL — HIGH (ref 70–99)
GLUCOSE BLDC GLUCOMTR-MCNC: 71 MG/DL — SIGNIFICANT CHANGE UP (ref 70–99)
GLUCOSE SERPL-MCNC: 54 MG/DL — LOW (ref 70–115)
HCT VFR BLD CALC: 28 % — LOW (ref 42–52)
HGB BLD-MCNC: 9 G/DL — LOW (ref 14–18)
LYMPHOCYTES # BLD AUTO: 0.9 K/UL — LOW (ref 1–4.8)
LYMPHOCYTES # BLD AUTO: 21 % — SIGNIFICANT CHANGE UP (ref 20–55)
MAGNESIUM SERPL-MCNC: 1.8 MG/DL — SIGNIFICANT CHANGE UP (ref 1.6–2.6)
MCHC RBC-ENTMCNC: 29.6 PG — SIGNIFICANT CHANGE UP (ref 27–31)
MCHC RBC-ENTMCNC: 32.1 G/DL — SIGNIFICANT CHANGE UP (ref 32–36)
MCV RBC AUTO: 92.1 FL — SIGNIFICANT CHANGE UP (ref 80–94)
MONOCYTES # BLD AUTO: 0.4 K/UL — SIGNIFICANT CHANGE UP (ref 0–0.8)
MONOCYTES NFR BLD AUTO: 9.4 % — SIGNIFICANT CHANGE UP (ref 3–10)
NEUTROPHILS # BLD AUTO: 2.8 K/UL — SIGNIFICANT CHANGE UP (ref 1.8–8)
NEUTROPHILS NFR BLD AUTO: 64.5 % — SIGNIFICANT CHANGE UP (ref 37–73)
PHOSPHATE SERPL-MCNC: 3.3 MG/DL — SIGNIFICANT CHANGE UP (ref 2.4–4.7)
PLATELET # BLD AUTO: 167 K/UL — SIGNIFICANT CHANGE UP (ref 150–400)
POTASSIUM SERPL-MCNC: 3.4 MMOL/L — LOW (ref 3.5–5.3)
POTASSIUM SERPL-SCNC: 3.4 MMOL/L — LOW (ref 3.5–5.3)
RBC # BLD: 3.04 M/UL — LOW (ref 4.6–6.2)
RBC # FLD: 15.5 % — SIGNIFICANT CHANGE UP (ref 11–15.6)
SODIUM SERPL-SCNC: 149 MMOL/L — HIGH (ref 135–145)
WBC # BLD: 4.3 K/UL — LOW (ref 4.8–10.8)
WBC # FLD AUTO: 4.3 K/UL — LOW (ref 4.8–10.8)

## 2018-01-15 PROCEDURE — 99233 SBSQ HOSP IP/OBS HIGH 50: CPT

## 2018-01-15 RX ORDER — POTASSIUM CHLORIDE 20 MEQ
40 PACKET (EA) ORAL ONCE
Qty: 0 | Refills: 0 | Status: COMPLETED | OUTPATIENT
Start: 2018-01-15 | End: 2018-01-15

## 2018-01-15 RX ADMIN — QUETIAPINE FUMARATE 25 MILLIGRAM(S): 200 TABLET, FILM COATED ORAL at 05:37

## 2018-01-15 RX ADMIN — Medication 20 MILLIGRAM(S): at 05:37

## 2018-01-15 RX ADMIN — AMLODIPINE BESYLATE 10 MILLIGRAM(S): 2.5 TABLET ORAL at 05:36

## 2018-01-15 RX ADMIN — Medication 20 MILLIGRAM(S): at 17:04

## 2018-01-15 RX ADMIN — Medication 1 GRAM(S): at 05:37

## 2018-01-15 RX ADMIN — TAMSULOSIN HYDROCHLORIDE 0.4 MILLIGRAM(S): 0.4 CAPSULE ORAL at 21:48

## 2018-01-15 RX ADMIN — DIVALPROEX SODIUM 250 MILLIGRAM(S): 500 TABLET, DELAYED RELEASE ORAL at 17:03

## 2018-01-15 RX ADMIN — CINACALCET 30 MILLIGRAM(S): 30 TABLET, FILM COATED ORAL at 17:03

## 2018-01-15 RX ADMIN — Medication 250 MILLIGRAM(S): at 17:03

## 2018-01-15 RX ADMIN — QUETIAPINE FUMARATE 100 MILLIGRAM(S): 200 TABLET, FILM COATED ORAL at 21:48

## 2018-01-15 RX ADMIN — DIVALPROEX SODIUM 250 MILLIGRAM(S): 500 TABLET, DELAYED RELEASE ORAL at 05:37

## 2018-01-15 RX ADMIN — QUETIAPINE FUMARATE 25 MILLIGRAM(S): 200 TABLET, FILM COATED ORAL at 17:03

## 2018-01-15 RX ADMIN — Medication 1 GRAM(S): at 11:39

## 2018-01-15 RX ADMIN — CINACALCET 30 MILLIGRAM(S): 30 TABLET, FILM COATED ORAL at 05:36

## 2018-01-15 RX ADMIN — Medication 40 MILLIEQUIVALENT(S): at 17:03

## 2018-01-15 RX ADMIN — ATORVASTATIN CALCIUM 40 MILLIGRAM(S): 80 TABLET, FILM COATED ORAL at 21:48

## 2018-01-15 RX ADMIN — ENOXAPARIN SODIUM 40 MILLIGRAM(S): 100 INJECTION SUBCUTANEOUS at 11:39

## 2018-01-15 RX ADMIN — Medication 1 GRAM(S): at 21:48

## 2018-01-15 RX ADMIN — Medication 50 MILLIGRAM(S): at 14:11

## 2018-01-15 RX ADMIN — Medication 50 MILLIGRAM(S): at 05:37

## 2018-01-15 RX ADMIN — PANTOPRAZOLE SODIUM 40 MILLIGRAM(S): 20 TABLET, DELAYED RELEASE ORAL at 05:37

## 2018-01-15 RX ADMIN — INSULIN GLARGINE 22 UNIT(S): 100 INJECTION, SOLUTION SUBCUTANEOUS at 21:53

## 2018-01-15 RX ADMIN — Medication 75 MICROGRAM(S): at 05:36

## 2018-01-15 RX ADMIN — SODIUM CHLORIDE 100 MILLILITER(S): 9 INJECTION, SOLUTION INTRAVENOUS at 08:55

## 2018-01-15 RX ADMIN — Medication 1 GRAM(S): at 17:03

## 2018-01-15 RX ADMIN — Medication 50 MILLIGRAM(S): at 21:48

## 2018-01-15 RX ADMIN — Medication 250 MILLIGRAM(S): at 05:37

## 2018-01-15 NOTE — PROGRESS NOTE BEHAVIORAL HEALTH - SUMMARY
Mr. Vega presented with agitation and erratic behavior at rehab, UTI and hypernatremia, has been in medical floor since 1/11, He is improving slowly, however still confused and unpredictable. He is not ready to be  admitted to psych inpatient at this point ( hospitals will be reluctant to accept him). he needs more time in medical floor. he is to be re evaluated tomorrow by psych for discharge vs admission.

## 2018-01-15 NOTE — PROGRESS NOTE ADULT - SUBJECTIVE AND OBJECTIVE BOX
CHIEF COMPLAINT/INTERVAL HISTORY:    Patient is a 67y old  Male who presents with a chief complaint of Agitation (11 Jan 2018 10:01)      HPI:  67M presented from the rehabilitation facility for agitation. The patient was sedated on interview and history was supplemented through review of available documentation from the rehabilitation facility, emergency department staff, and behavioral health assessment. The patient was note to be agitated and belligerent at the rehabilitation facility. He was also noted to be combative. Most recently he had refused medications. There was no mention of fevers or dyspnea/cough. The patient was also noted to have been recently admitted for a urinary tract infection and has a chronic indwelling Hardin catheter for urinary retention. Further information was otherwise limited. The patient denied any chest pain, dyspnea, or abdominal pain but did admit to lack of appetite. (11 Jan 2018 10:01)      SUBJECTIVE & OBJECTIVE/ ROS: Pt seen and examined at bedside. No further episodes of agitation as per RN. No chest pain, palpitations, sob, light headedness/dizziness, difficulty breathing/cough, fevers/chills, abdominal pain, n/v, diarrhea/constipation, dysuria or increased urinary frequency.     ICU Vital Signs Last 24 Hrs  T(C): 36.6 (15 Fidel 2018 15:59), Max: 36.8 (15 Fidel 2018 07:20)  T(F): 97.8 (15 Fidel 2018 15:59), Max: 98.2 (15 Fidel 2018 07:20)  HR: 67 (15 Fidel 2018 15:59) (57 - 88)  BP: 156/82 (15 Fidel 2018 15:59) (145/72 - 168/78)  BP(mean): --  ABP: --  ABP(mean): --  RR: 18 (15 Fidel 2018 15:59) (18 - 18)  SpO2: 99% (15 Fidel 2018 15:59) (94% - 100%)        MEDICATIONS  (STANDING):  amLODIPine   Tablet 10 milliGRAM(s) Oral daily  atorvastatin 40 milliGRAM(s) Oral at bedtime  cinacalcet 30 milliGRAM(s) Oral two times a day  ciprofloxacin     Tablet 250 milliGRAM(s) Oral every 12 hours  dextrose 5%. 1000 milliLiter(s) (50 mL/Hr) IV Continuous <Continuous>  dextrose 50% Injectable 12.5 Gram(s) IV Push once  dextrose 50% Injectable 25 Gram(s) IV Push once  dextrose 50% Injectable 25 Gram(s) IV Push once  diVALproex  milliGRAM(s) Oral two times a day  enoxaparin Injectable 40 milliGRAM(s) SubCutaneous daily  hydrALAZINE 50 milliGRAM(s) Oral every 8 hours  insulin glargine Injectable (LANTUS) 22 Unit(s) SubCutaneous at bedtime  insulin lispro (HumaLOG) corrective regimen sliding scale   SubCutaneous three times a day before meals  levothyroxine 75 MICROGram(s) Oral daily  pantoprazole    Tablet 40 milliGRAM(s) Oral before breakfast  potassium chloride   Powder 40 milliEquivalent(s) Oral once  propranolol 20 milliGRAM(s) Oral two times a day  QUEtiapine 100 milliGRAM(s) Oral at bedtime  QUEtiapine 25 milliGRAM(s) Oral two times a day  sodium chloride 0.45%. 1000 milliLiter(s) (100 mL/Hr) IV Continuous <Continuous>  sucralfate suspension 1 Gram(s) Oral four times a day before meals  tamsulosin 0.4 milliGRAM(s) Oral at bedtime    MEDICATIONS  (PRN):  dextrose Gel 1 Dose(s) Oral once PRN Blood Glucose LESS THAN 70 milliGRAM(s)/deciliter  glucagon  Injectable 1 milliGRAM(s) IntraMuscular once PRN Glucose LESS THAN 70 milligrams/deciliter  haloperidol    Injectable 2.5 milliGRAM(s) IntraMuscular every 6 hours PRN Agitation  hydrALAZINE Injectable 10 milliGRAM(s) IV Push every 6 hours PRN for SBP > 150  LORazepam     Tablet 1 milliGRAM(s) Oral every 6 hours PRN Agitation      LABS:                        9.0    4.3   )-----------( 167      ( 15 Fidel 2018 06:22 )             28.0     01-15    149<H>  |  118<H>  |  21.0<H>  ----------------------------<  54<L>  3.4<L>   |  20.0<L>  |  1.53<H>    Ca    8.8      15 Fidel 2018 06:22  Phos  3.3     01-15  Mg     1.8     01-15            CAPILLARY BLOOD GLUCOSE      POCT Blood Glucose.: 113 mg/dL (15 Fidel 2018 11:32)  POCT Blood Glucose.: 71 mg/dL (15 Fidel 2018 07:38)  POCT Blood Glucose.: 140 mg/dL (14 Jan 2018 21:06)      RECENT CULTURES:      RADIOLOGY & ADDITIONAL TESTS:      PHYSICAL EXAM:    General appearance: NAD, Awake, Alert x 2  HEENT: NCAT, Conjunctiva clear, EOMI  Neck: Supple, No JVD, No tenderness  Lungs: Clear to auscultation, Breath sound equal bilaterally, No wheezes, No rales  Cardiovascular: S1S2, Regular rhythm  Abdomen: Soft, Nontender, Nondistended, No guarding/rebound, Positive bowel sounds  Extremities: No clubbing, No cyanosis, No edema, No calf tenderness  Neuro: Strength equal bilaterally, No tremors  Psychiatric: Appropriate mood, Normal affect  + Chronic Hardin with clear urine

## 2018-01-16 LAB
ANION GAP SERPL CALC-SCNC: 14 MMOL/L — SIGNIFICANT CHANGE UP (ref 5–17)
BASOPHILS # BLD AUTO: 0 K/UL — SIGNIFICANT CHANGE UP (ref 0–0.2)
BASOPHILS NFR BLD AUTO: 0.2 % — SIGNIFICANT CHANGE UP (ref 0–2)
BUN SERPL-MCNC: 15 MG/DL — SIGNIFICANT CHANGE UP (ref 8–20)
CALCIUM SERPL-MCNC: 8.9 MG/DL — SIGNIFICANT CHANGE UP (ref 8.6–10.2)
CHLORIDE SERPL-SCNC: 113 MMOL/L — HIGH (ref 98–107)
CO2 SERPL-SCNC: 19 MMOL/L — LOW (ref 22–29)
CREAT SERPL-MCNC: 1.42 MG/DL — HIGH (ref 0.5–1.3)
EOSINOPHIL # BLD AUTO: 0.2 K/UL — SIGNIFICANT CHANGE UP (ref 0–0.5)
EOSINOPHIL NFR BLD AUTO: 4.7 % — SIGNIFICANT CHANGE UP (ref 0–5)
GLUCOSE BLDC GLUCOMTR-MCNC: 138 MG/DL — HIGH (ref 70–99)
GLUCOSE BLDC GLUCOMTR-MCNC: 149 MG/DL — HIGH (ref 70–99)
GLUCOSE BLDC GLUCOMTR-MCNC: 167 MG/DL — HIGH (ref 70–99)
GLUCOSE BLDC GLUCOMTR-MCNC: 90 MG/DL — SIGNIFICANT CHANGE UP (ref 70–99)
GLUCOSE SERPL-MCNC: 78 MG/DL — SIGNIFICANT CHANGE UP (ref 70–115)
HCT VFR BLD CALC: 28.3 % — LOW (ref 42–52)
HGB BLD-MCNC: 9 G/DL — LOW (ref 14–18)
LYMPHOCYTES # BLD AUTO: 1 K/UL — SIGNIFICANT CHANGE UP (ref 1–4.8)
LYMPHOCYTES # BLD AUTO: 20.8 % — SIGNIFICANT CHANGE UP (ref 20–55)
MAGNESIUM SERPL-MCNC: 1.7 MG/DL — SIGNIFICANT CHANGE UP (ref 1.6–2.6)
MCHC RBC-ENTMCNC: 29.2 PG — SIGNIFICANT CHANGE UP (ref 27–31)
MCHC RBC-ENTMCNC: 31.8 G/DL — LOW (ref 32–36)
MCV RBC AUTO: 91.9 FL — SIGNIFICANT CHANGE UP (ref 80–94)
MONOCYTES # BLD AUTO: 0.4 K/UL — SIGNIFICANT CHANGE UP (ref 0–0.8)
MONOCYTES NFR BLD AUTO: 8.6 % — SIGNIFICANT CHANGE UP (ref 3–10)
NEUTROPHILS # BLD AUTO: 3 K/UL — SIGNIFICANT CHANGE UP (ref 1.8–8)
NEUTROPHILS NFR BLD AUTO: 65.3 % — SIGNIFICANT CHANGE UP (ref 37–73)
PHOSPHATE SERPL-MCNC: 3 MG/DL — SIGNIFICANT CHANGE UP (ref 2.4–4.7)
PLATELET # BLD AUTO: 149 K/UL — LOW (ref 150–400)
POTASSIUM SERPL-MCNC: 3.6 MMOL/L — SIGNIFICANT CHANGE UP (ref 3.5–5.3)
POTASSIUM SERPL-SCNC: 3.6 MMOL/L — SIGNIFICANT CHANGE UP (ref 3.5–5.3)
RBC # BLD: 3.08 M/UL — LOW (ref 4.6–6.2)
RBC # FLD: 15.7 % — HIGH (ref 11–15.6)
SODIUM SERPL-SCNC: 146 MMOL/L — HIGH (ref 135–145)
WBC # BLD: 4.7 K/UL — LOW (ref 4.8–10.8)
WBC # FLD AUTO: 4.7 K/UL — LOW (ref 4.8–10.8)

## 2018-01-16 PROCEDURE — 99232 SBSQ HOSP IP/OBS MODERATE 35: CPT

## 2018-01-16 PROCEDURE — 99233 SBSQ HOSP IP/OBS HIGH 50: CPT

## 2018-01-16 RX ADMIN — AMLODIPINE BESYLATE 10 MILLIGRAM(S): 2.5 TABLET ORAL at 05:24

## 2018-01-16 RX ADMIN — Medication 1 GRAM(S): at 17:29

## 2018-01-16 RX ADMIN — ATORVASTATIN CALCIUM 40 MILLIGRAM(S): 80 TABLET, FILM COATED ORAL at 21:32

## 2018-01-16 RX ADMIN — Medication 1 GRAM(S): at 06:43

## 2018-01-16 RX ADMIN — CINACALCET 30 MILLIGRAM(S): 30 TABLET, FILM COATED ORAL at 05:24

## 2018-01-16 RX ADMIN — CINACALCET 30 MILLIGRAM(S): 30 TABLET, FILM COATED ORAL at 17:30

## 2018-01-16 RX ADMIN — Medication 20 MILLIGRAM(S): at 17:30

## 2018-01-16 RX ADMIN — ENOXAPARIN SODIUM 40 MILLIGRAM(S): 100 INJECTION SUBCUTANEOUS at 12:06

## 2018-01-16 RX ADMIN — DIVALPROEX SODIUM 250 MILLIGRAM(S): 500 TABLET, DELAYED RELEASE ORAL at 05:25

## 2018-01-16 RX ADMIN — Medication 1 GRAM(S): at 21:32

## 2018-01-16 RX ADMIN — Medication 50 MILLIGRAM(S): at 05:25

## 2018-01-16 RX ADMIN — Medication 250 MILLIGRAM(S): at 05:26

## 2018-01-16 RX ADMIN — TAMSULOSIN HYDROCHLORIDE 0.4 MILLIGRAM(S): 0.4 CAPSULE ORAL at 21:32

## 2018-01-16 RX ADMIN — SODIUM CHLORIDE 100 MILLILITER(S): 9 INJECTION, SOLUTION INTRAVENOUS at 17:29

## 2018-01-16 RX ADMIN — Medication 50 MILLIGRAM(S): at 13:45

## 2018-01-16 RX ADMIN — Medication 2: at 12:05

## 2018-01-16 RX ADMIN — Medication 1 GRAM(S): at 12:06

## 2018-01-16 RX ADMIN — INSULIN GLARGINE 22 UNIT(S): 100 INJECTION, SOLUTION SUBCUTANEOUS at 21:31

## 2018-01-16 RX ADMIN — Medication 75 MICROGRAM(S): at 05:25

## 2018-01-16 RX ADMIN — Medication 20 MILLIGRAM(S): at 05:24

## 2018-01-16 RX ADMIN — QUETIAPINE FUMARATE 25 MILLIGRAM(S): 200 TABLET, FILM COATED ORAL at 17:31

## 2018-01-16 RX ADMIN — Medication 10 MILLIGRAM(S): at 05:30

## 2018-01-16 RX ADMIN — Medication 250 MILLIGRAM(S): at 17:30

## 2018-01-16 RX ADMIN — DIVALPROEX SODIUM 250 MILLIGRAM(S): 500 TABLET, DELAYED RELEASE ORAL at 17:31

## 2018-01-16 RX ADMIN — PANTOPRAZOLE SODIUM 40 MILLIGRAM(S): 20 TABLET, DELAYED RELEASE ORAL at 06:43

## 2018-01-16 RX ADMIN — QUETIAPINE FUMARATE 100 MILLIGRAM(S): 200 TABLET, FILM COATED ORAL at 21:32

## 2018-01-16 RX ADMIN — QUETIAPINE FUMARATE 25 MILLIGRAM(S): 200 TABLET, FILM COATED ORAL at 05:25

## 2018-01-16 RX ADMIN — Medication 50 MILLIGRAM(S): at 21:32

## 2018-01-16 NOTE — PROGRESS NOTE ADULT - SUBJECTIVE AND OBJECTIVE BOX
CHIEF COMPLAINT/INTERVAL HISTORY:    Patient is a 67y old  Male who presents with a chief complaint of Agitation (11 Jan 2018 10:01)      HPI:  67M presented from the rehabilitation facility for agitation. The patient was sedated on interview and history was supplemented through review of available documentation from the rehabilitation facility, emergency department staff, and behavioral health assessment. The patient was note to be agitated and belligerent at the rehabilitation facility. He was also noted to be combative. Most recently he had refused medications. There was no mention of fevers or dyspnea/cough. The patient was also noted to have been recently admitted for a urinary tract infection and has a chronic indwelling Hardin catheter for urinary retention. Further information was otherwise limited. The patient denied any chest pain, dyspnea, or abdominal pain but did admit to lack of appetite. (11 Jan 2018 10:01)      SUBJECTIVE & OBJECTIVE/ ROS: Pt seen and examined at bedside. No chest pain, palpitations, sob, light headedness/dizziness, difficulty breathing/cough, fevers/chills, abdominal pain, n/v, diarrhea/constipation, dysuria or increased urinary frequency. No episodes of agitation reported today    ICU Vital Signs Last 24 Hrs  T(C): 36.2 (16 Jan 2018 16:19), Max: 36.7 (16 Jan 2018 07:13)  T(F): 97.1 (16 Jan 2018 16:19), Max: 98 (16 Jan 2018 07:13)  HR: 73 (16 Jan 2018 16:19) (62 - 73)  BP: 123/73 (16 Jan 2018 16:19) (123/73 - 176/72)  BP(mean): --  ABP: --  ABP(mean): --  RR: 16 (16 Jan 2018 16:19) (16 - 16)  SpO2: 97% (16 Jan 2018 16:19) (97% - 98%)        MEDICATIONS  (STANDING):  amLODIPine   Tablet 10 milliGRAM(s) Oral daily  atorvastatin 40 milliGRAM(s) Oral at bedtime  cinacalcet 30 milliGRAM(s) Oral two times a day  ciprofloxacin     Tablet 250 milliGRAM(s) Oral every 12 hours  dextrose 5%. 1000 milliLiter(s) (50 mL/Hr) IV Continuous <Continuous>  dextrose 50% Injectable 12.5 Gram(s) IV Push once  dextrose 50% Injectable 25 Gram(s) IV Push once  dextrose 50% Injectable 25 Gram(s) IV Push once  diVALproex  milliGRAM(s) Oral two times a day  enoxaparin Injectable 40 milliGRAM(s) SubCutaneous daily  hydrALAZINE 50 milliGRAM(s) Oral every 8 hours  insulin glargine Injectable (LANTUS) 22 Unit(s) SubCutaneous at bedtime  insulin lispro (HumaLOG) corrective regimen sliding scale   SubCutaneous three times a day before meals  levothyroxine 75 MICROGram(s) Oral daily  pantoprazole    Tablet 40 milliGRAM(s) Oral before breakfast  propranolol 20 milliGRAM(s) Oral two times a day  QUEtiapine 100 milliGRAM(s) Oral at bedtime  QUEtiapine 25 milliGRAM(s) Oral two times a day  sodium chloride 0.45%. 1000 milliLiter(s) (100 mL/Hr) IV Continuous <Continuous>  sucralfate suspension 1 Gram(s) Oral four times a day before meals  tamsulosin 0.4 milliGRAM(s) Oral at bedtime    MEDICATIONS  (PRN):  dextrose Gel 1 Dose(s) Oral once PRN Blood Glucose LESS THAN 70 milliGRAM(s)/deciliter  glucagon  Injectable 1 milliGRAM(s) IntraMuscular once PRN Glucose LESS THAN 70 milligrams/deciliter  haloperidol    Injectable 2.5 milliGRAM(s) IntraMuscular every 6 hours PRN Agitation  hydrALAZINE Injectable 10 milliGRAM(s) IV Push every 6 hours PRN for SBP > 150  LORazepam     Tablet 1 milliGRAM(s) Oral every 6 hours PRN Agitation      LABS:                        9.0    4.7   )-----------( 149      ( 16 Jan 2018 08:23 )             28.3     01-16    146<H>  |  113<H>  |  15.0  ----------------------------<  78  3.6   |  19.0<L>  |  1.42<H>    Ca    8.9      16 Jan 2018 08:23  Phos  3.0     01-16  Mg     1.7     01-16            CAPILLARY BLOOD GLUCOSE      POCT Blood Glucose.: 138 mg/dL (16 Jan 2018 16:35)  POCT Blood Glucose.: 167 mg/dL (16 Jan 2018 11:58)  POCT Blood Glucose.: 90 mg/dL (16 Jan 2018 07:28)  POCT Blood Glucose.: 113 mg/dL (15 Fidel 2018 21:51)      RECENT CULTURES:      RADIOLOGY & ADDITIONAL TESTS:      PHYSICAL EXAM:    General appearance: NAD, Awake, Alert x 2, appears calm  HEENT: NCAT, Conjunctiva clear, EOMI  Neck: Supple, No JVD, No tenderness  Lungs: Clear to auscultation, Breath sound equal bilaterally, No wheezes, No rales  Cardiovascular: S1S2, Regular rhythm  Abdomen: Soft, Nontender, Nondistended, No guarding/rebound, Positive bowel sounds  Extremities: No clubbing, No cyanosis, No edema, No calf tenderness  Neuro: Strength equal bilaterally, No tremors  Psychiatric: Appropriate mood, Normal affect  + Chronic Hardin with clear urine

## 2018-01-17 ENCOUNTER — TRANSCRIPTION ENCOUNTER (OUTPATIENT)
Age: 68
End: 2018-01-17

## 2018-01-17 DIAGNOSIS — R33.9 RETENTION OF URINE, UNSPECIFIED: ICD-10-CM

## 2018-01-17 DIAGNOSIS — T83.511A INFECTION AND INFLAMMATORY REACTION DUE TO INDWELLING URETHRAL CATHETER, INITIAL ENCOUNTER: ICD-10-CM

## 2018-01-17 LAB
ANION GAP SERPL CALC-SCNC: 12 MMOL/L — SIGNIFICANT CHANGE UP (ref 5–17)
BASOPHILS # BLD AUTO: 0 K/UL — SIGNIFICANT CHANGE UP (ref 0–0.2)
BASOPHILS NFR BLD AUTO: 0.4 % — SIGNIFICANT CHANGE UP (ref 0–2)
BUN SERPL-MCNC: 20 MG/DL — SIGNIFICANT CHANGE UP (ref 8–20)
CALCIUM SERPL-MCNC: 9 MG/DL — SIGNIFICANT CHANGE UP (ref 8.6–10.2)
CHLORIDE SERPL-SCNC: 112 MMOL/L — HIGH (ref 98–107)
CO2 SERPL-SCNC: 20 MMOL/L — LOW (ref 22–29)
CREAT SERPL-MCNC: 1.59 MG/DL — HIGH (ref 0.5–1.3)
EOSINOPHIL # BLD AUTO: 0.2 K/UL — SIGNIFICANT CHANGE UP (ref 0–0.5)
EOSINOPHIL NFR BLD AUTO: 3.7 % — SIGNIFICANT CHANGE UP (ref 0–6)
GLUCOSE BLDC GLUCOMTR-MCNC: 106 MG/DL — HIGH (ref 70–99)
GLUCOSE BLDC GLUCOMTR-MCNC: 121 MG/DL — HIGH (ref 70–99)
GLUCOSE BLDC GLUCOMTR-MCNC: 133 MG/DL — HIGH (ref 70–99)
GLUCOSE BLDC GLUCOMTR-MCNC: 64 MG/DL — LOW (ref 70–99)
GLUCOSE SERPL-MCNC: 63 MG/DL — LOW (ref 70–115)
HCT VFR BLD CALC: 30.9 % — LOW (ref 42–52)
HGB BLD-MCNC: 9.8 G/DL — LOW (ref 14–18)
LYMPHOCYTES # BLD AUTO: 1.1 K/UL — SIGNIFICANT CHANGE UP (ref 1–4.8)
LYMPHOCYTES # BLD AUTO: 20.1 % — SIGNIFICANT CHANGE UP (ref 20–55)
MAGNESIUM SERPL-MCNC: 1.7 MG/DL — SIGNIFICANT CHANGE UP (ref 1.6–2.6)
MCHC RBC-ENTMCNC: 29.2 PG — SIGNIFICANT CHANGE UP (ref 27–31)
MCHC RBC-ENTMCNC: 31.7 G/DL — LOW (ref 32–36)
MCV RBC AUTO: 92 FL — SIGNIFICANT CHANGE UP (ref 80–94)
MONOCYTES # BLD AUTO: 0.5 K/UL — SIGNIFICANT CHANGE UP (ref 0–0.8)
MONOCYTES NFR BLD AUTO: 8.5 % — SIGNIFICANT CHANGE UP (ref 3–10)
NEUTROPHILS # BLD AUTO: 3.7 K/UL — SIGNIFICANT CHANGE UP (ref 1.8–8)
NEUTROPHILS NFR BLD AUTO: 65.5 % — SIGNIFICANT CHANGE UP (ref 37–73)
PHOSPHATE SERPL-MCNC: 2.6 MG/DL — SIGNIFICANT CHANGE UP (ref 2.4–4.7)
PLATELET # BLD AUTO: 180 K/UL — SIGNIFICANT CHANGE UP (ref 150–400)
POTASSIUM SERPL-MCNC: 3.6 MMOL/L — SIGNIFICANT CHANGE UP (ref 3.5–5.3)
POTASSIUM SERPL-SCNC: 3.6 MMOL/L — SIGNIFICANT CHANGE UP (ref 3.5–5.3)
RBC # BLD: 3.36 M/UL — LOW (ref 4.6–6.2)
RBC # FLD: 15.1 % — SIGNIFICANT CHANGE UP (ref 11–15.6)
SODIUM SERPL-SCNC: 144 MMOL/L — SIGNIFICANT CHANGE UP (ref 135–145)
WBC # BLD: 5.7 K/UL — SIGNIFICANT CHANGE UP (ref 4.8–10.8)
WBC # FLD AUTO: 5.7 K/UL — SIGNIFICANT CHANGE UP (ref 4.8–10.8)

## 2018-01-17 PROCEDURE — 71045 X-RAY EXAM CHEST 1 VIEW: CPT | Mod: 26

## 2018-01-17 PROCEDURE — 99233 SBSQ HOSP IP/OBS HIGH 50: CPT

## 2018-01-17 PROCEDURE — 99232 SBSQ HOSP IP/OBS MODERATE 35: CPT

## 2018-01-17 RX ORDER — QUETIAPINE FUMARATE 200 MG/1
0.5 TABLET, FILM COATED ORAL
Qty: 0 | Refills: 0 | COMMUNITY

## 2018-01-17 RX ORDER — HALOPERIDOL DECANOATE 100 MG/ML
2.5 INJECTION INTRAMUSCULAR
Qty: 0 | Refills: 0 | COMMUNITY
Start: 2018-01-17

## 2018-01-17 RX ORDER — PHENAZOPYRIDINE HCL 100 MG
1 TABLET ORAL
Qty: 0 | Refills: 0 | COMMUNITY

## 2018-01-17 RX ADMIN — INSULIN GLARGINE 22 UNIT(S): 100 INJECTION, SOLUTION SUBCUTANEOUS at 21:30

## 2018-01-17 RX ADMIN — QUETIAPINE FUMARATE 25 MILLIGRAM(S): 200 TABLET, FILM COATED ORAL at 05:32

## 2018-01-17 RX ADMIN — Medication 1 GRAM(S): at 05:33

## 2018-01-17 RX ADMIN — Medication 50 MILLIGRAM(S): at 05:33

## 2018-01-17 RX ADMIN — ENOXAPARIN SODIUM 40 MILLIGRAM(S): 100 INJECTION SUBCUTANEOUS at 11:33

## 2018-01-17 RX ADMIN — ATORVASTATIN CALCIUM 40 MILLIGRAM(S): 80 TABLET, FILM COATED ORAL at 21:30

## 2018-01-17 RX ADMIN — DIVALPROEX SODIUM 250 MILLIGRAM(S): 500 TABLET, DELAYED RELEASE ORAL at 17:09

## 2018-01-17 RX ADMIN — PANTOPRAZOLE SODIUM 40 MILLIGRAM(S): 20 TABLET, DELAYED RELEASE ORAL at 05:32

## 2018-01-17 RX ADMIN — Medication 50 MILLIGRAM(S): at 14:28

## 2018-01-17 RX ADMIN — Medication 20 MILLIGRAM(S): at 17:09

## 2018-01-17 RX ADMIN — Medication 1 GRAM(S): at 11:33

## 2018-01-17 RX ADMIN — TAMSULOSIN HYDROCHLORIDE 0.4 MILLIGRAM(S): 0.4 CAPSULE ORAL at 21:30

## 2018-01-17 RX ADMIN — DIVALPROEX SODIUM 250 MILLIGRAM(S): 500 TABLET, DELAYED RELEASE ORAL at 05:32

## 2018-01-17 RX ADMIN — QUETIAPINE FUMARATE 25 MILLIGRAM(S): 200 TABLET, FILM COATED ORAL at 17:09

## 2018-01-17 RX ADMIN — Medication 75 MICROGRAM(S): at 05:33

## 2018-01-17 RX ADMIN — Medication 1 GRAM(S): at 21:33

## 2018-01-17 RX ADMIN — CINACALCET 30 MILLIGRAM(S): 30 TABLET, FILM COATED ORAL at 17:09

## 2018-01-17 RX ADMIN — Medication 50 MILLIGRAM(S): at 21:30

## 2018-01-17 RX ADMIN — AMLODIPINE BESYLATE 10 MILLIGRAM(S): 2.5 TABLET ORAL at 05:32

## 2018-01-17 RX ADMIN — Medication 20 MILLIGRAM(S): at 05:32

## 2018-01-17 RX ADMIN — QUETIAPINE FUMARATE 100 MILLIGRAM(S): 200 TABLET, FILM COATED ORAL at 21:30

## 2018-01-17 RX ADMIN — CINACALCET 30 MILLIGRAM(S): 30 TABLET, FILM COATED ORAL at 05:32

## 2018-01-17 NOTE — DISCHARGE NOTE ADULT - CARE PLAN
Principal Discharge DX:	Delirium due to another medical condition  Goal:	resolved  Assessment and plan of treatment:	Continue with home meds as directed. Follow up with your primary doctor & psychiatry within 1 week of discharge  Secondary Diagnosis:	Bipolar 1 disorder  Assessment and plan of treatment:	Continue with home meds as directed. Follow up with your primary doctor & psychiatry within 1 week of discharge  Secondary Diagnosis:	HTN (hypertension)  Assessment and plan of treatment:	Continue with home meds as directed. Follow up with your primary doctor  Secondary Diagnosis:	Urinary retention  Assessment and plan of treatment:	Maintain Gama. The catheter was changed on 1/11/18.  Maintain gama with monthly changes.  Do not treat pt with antibiotics for a positive UA or urine culture unless he has symptoms, fever, leukocytosis.  Secondary Diagnosis:	Urinary tract infection associated with indwelling urethral catheter, initial encounter  Assessment and plan of treatment:	Likely colonization of Gama. Do not treat pt with antibiotics for a positive UA or urine culture unless he has symptoms, fever, leukocytosis.  Secondary Diagnosis:	Hypernatremia  Assessment and plan of treatment:	Maintain free water by mouth 250cc  4 times a day. Follow up with your primary doctor for BMP check in 1 week  Secondary Diagnosis:	Type 2 diabetes mellitus with complication, unspecified long term insulin use status  Assessment and plan of treatment:	Continue with home meds as directed. Follow up with your primary doctor

## 2018-01-17 NOTE — PROGRESS NOTE BEHAVIORAL HEALTH - NSBHCHARTREVIEWLAB_PSY_A_CORE FT
9.0    4.7   )-----------( 149      ( 16 Jan 2018 08:23 )             28.3     01-16    146<H>  |  113<H>  |  15.0  ----------------------------<  78  3.6   |  19.0<L>  |  1.42<H>    Ca    8.9      16 Jan 2018 08:23  Phos  3.0     01-16  Mg     1.7     01-16
9.0    4.7   )-----------( 149      ( 16 Jan 2018 08:23 )             28.3     01-16    146<H>  |  113<H>  |  15.0  ----------------------------<  78  3.6   |  19.0<L>  |  1.42<H>    Ca    8.9      16 Jan 2018 08:23  Phos  3.0     01-16  Mg     1.7     01-16

## 2018-01-17 NOTE — DISCHARGE NOTE ADULT - PROVIDER TOKENS
ABA:'[id number]',ABA:'[id number]' TOKEN:'[id number]',TOKEN:'[id number]',TOKEN:'[id number]'

## 2018-01-17 NOTE — DISCHARGE NOTE ADULT - MEDICATION SUMMARY - MEDICATIONS TO TAKE
I will START or STAY ON the medications listed below when I get home from the hospital:    Flomax 0.4 mg oral capsule  -- 1 cap(s) by mouth once a day  -- Indication: For Bph    propranolol 20 mg oral tablet  -- 2 tab(s) by mouth 2 times a day  -- Indication: For htn    LORazepam 1 mg oral tablet  -- 1 tab(s) by mouth every 6 hours, As needed, Agitation  -- Indication: For agitation    divalproex sodium 250 mg oral delayed release tablet  -- 1 tab(s) by mouth every 12 hours  -- Indication: For Bipolar 1 disorder with moderate billy    Lantus 100 units/mL subcutaneous solution  -- 22 unit(s) subcutaneous once a day (at bedtime)  -- Indication: For Dm    insulin lispro 100 units/mL subcutaneous solution  -- 2 unit(s) subcutaneous 3 times a day (before meals)  -- Indication: For Dm    Lipitor 40 mg oral tablet  -- 1 tab(s) by mouth once a day  -- Indication: For hld    SEROquel 25 mg oral tablet  -- 1 tab(s) by mouth 2 times a day  -- Indication: For Delirium due to another medical condition    haloperidol  -- 2.5 milligram(s) intramuscularly every 6 hours, As Needed for agitation  -- Indication: For Delirium/ agitation    Sensipar 30 mg oral tablet  -- 1 tab(s) by mouth 2 times a day  -- Indication: For ckd    amLODIPine 10 mg oral tablet  -- 1 tab(s) by mouth once a day  -- Indication: For htn    Carafate 1 g oral tablet  -- 1 tab(s) by mouth 4 times a day (before meals and at bedtime)  -- Indication: For Ulcer    Protonix 40 mg oral delayed release tablet  -- 1 tab(s) by mouth once a day  -- Indication: For gerd    levothyroxine 75 mcg (0.075 mg) oral tablet  -- 1 tab(s) by mouth once a day  -- Indication: For hypothyroid    hydrALAZINE 50 mg oral tablet  -- 1 tab(s) by mouth every 8 hours  -- Indication: For htn I will START or STAY ON the medications listed below when I get home from the hospital:    Flomax 0.4 mg oral capsule  -- 1 cap(s) by mouth once a day  -- Indication: For Bph    propranolol 20 mg oral tablet  -- 2 tab(s) by mouth 2 times a day  -- Indication: For htn    LORazepam 1 mg oral tablet  -- 1 tab(s) by mouth every 6 hours, As needed, Agitation  -- Indication: For agitation    divalproex sodium 250 mg oral delayed release tablet  -- 1 tab(s) by mouth every 12 hours  -- Indication: For Bipolar 1 disorder with moderate billy    Lantus 100 units/mL subcutaneous solution  -- 22 unit(s) subcutaneous once a day (at bedtime)  -- Indication: For Dm    insulin lispro 100 units/mL subcutaneous solution  -- 2 unit(s) subcutaneous 3 times a day (before meals)  -- Indication: For Dm    Lipitor 40 mg oral tablet  -- 1 tab(s) by mouth once a day  -- Indication: For hld    SEROquel 25 mg oral tablet  -- 1 tab(s) by mouth 2 times a day  -- Indication: For Delirium due to another medical condition    haloperidol  -- 2.5 milligram(s) intramuscularly every 6 hours, As Needed for agitation  -- Indication: For Delirium/ agitation    Sensipar 30 mg oral tablet  -- 1 tab(s) by mouth 2 times a day  -- Indication: For ckd    amLODIPine 10 mg oral tablet  -- 1 tab(s) by mouth once a day  -- Indication: For htn    petrolatum topical ointment  -- 1 application on skin once a day  -- Indication: For rash    Carafate 1 g oral tablet  -- 1 tab(s) by mouth 4 times a day (before meals and at bedtime)  -- Indication: For Ulcer    Protonix 40 mg oral delayed release tablet  -- 1 tab(s) by mouth once a day  -- Indication: For gerd    levothyroxine 75 mcg (0.075 mg) oral tablet  -- 1 tab(s) by mouth once a day  -- Indication: For hypothyroid    hydrALAZINE 50 mg oral tablet  -- 1 tab(s) by mouth every 8 hours  -- Indication: For htn

## 2018-01-17 NOTE — DISCHARGE NOTE ADULT - MEDICATION SUMMARY - MEDICATIONS TO STOP TAKING
I will STOP taking the medications listed below when I get home from the hospital:    Pyridium 100 mg oral tablet  -- 1 tab(s) by mouth 3 times a day (after meals)    SEROquel 100 mg oral tablet  -- 0.5 tab(s) by mouth once a day (at bedtime)    cefpodoxime 200 mg oral tablet  -- 1 tab(s) by mouth every 12 hours   -- Finish all this medication unless otherwise directed by prescriber.  Take with food or milk.

## 2018-01-17 NOTE — DISCHARGE NOTE ADULT - ADDITIONAL INSTRUCTIONS
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 As per dermatology this area be photographed by the staff and followed.  If new ones appear, he should be seen by a dermatologist.  There is no contraindication to his discharge if medically he is otherwise cleared.

## 2018-01-17 NOTE — DISCHARGE NOTE ADULT - CARE PROVIDER_API CALL
Leo Weber), Urology  [address] [place] [address]  Phone: [phone]  Fax: [phone]    Bharat Zhang), Psychiatry  [address] [place] [address]  Phone: [phone]  Fax: [phone] Leo Weber), Urology  [address] [place] [address]  Phone: [phone]  Fax: [phone]    Bharat Zhang (MD), Psychiatry  [address] [place] [address]  Phone: [phone]  Fax: [phone]    Zane Self), Dermatology  [address] [place] [address]  Phone: [phone]  Fax: [phone]

## 2018-01-17 NOTE — PROGRESS NOTE BEHAVIORAL HEALTH - SECONDARY DX1
Bipolar 1 disorder with moderate billy

## 2018-01-17 NOTE — PROGRESS NOTE BEHAVIORAL HEALTH - NSBHFUPINTERVALHXFT_PSY_A_CORE
Mr. Vega was evaluated in his room, he was on bed, calm, sensorium is cleared than last evaluation.  He admitted to being confused at times.  He knows that he is in the hospital and came from [facility].  States he would like to return back there.  He denies depressed mood,  denies any suicidal or homicidal ideation/intent or plan. No psychotic sx's were elicited.  As per nurse, patient has been calm with no reports of aggression or agitation and no PRNs needed
Mr. Vega was evaluated in his room, he was on bed, calm, responds coherently to questions with some latency of response and flat affect.  He admitted to being confused at times.  He knows that he is in the hospital but forgets the name.  He stated the year is 2000, unable to name month or day of the week.  His concentration and attention appear to be improved.  He is able to spell World backwards and able to state days of the week and months in reverse order.    He stated he did not like the food.  He recalled that he has a history of bipolar disorder but is not able to give further details.  He reports he is a little depressed, but denies any suicidal or homicidal ideation/intent or plan. No psychotic sx's were elicited.  As per nurse, patient has been calm with no reports of aggression or agitation.
Patient has not received PRN for agitation since admit day  on Fidel 10. CO 1:1 at bedside states patient has been calm, intermittently appears restless, has not been agitated or aggressive, reports patient ate very well today.  Patient reports mood is "not that good" but cannot further elaborate.   Patient reports good sleep and appetite, denies depression, denies AH,VH and paranoia. He denies having any concerns at this time.
Mr. Vega was evaluated in his room, he was on bed, calm, responds coherently to questions but takes several seconds to respond, has flat affect with infrequent blinking, distracted and limited attention span, unable to recall circumstances of his admission to hospital, although he is aware that he has UTI. he is awake and alert, oriented to place but does not know which hospital, he is not oriented to day, month, year or day of the week. reports that he has been in the hospital for several month and that he lives alone. he eventually said that he lives with his wife, then again "I live alone, I think I am confused". He reports that he takes "only lithium, it works for my bipolar". he denies any prior hospitalization, nor prior suicide attempts in the past.   As per nursing staff, his behavior has been under control, he was calm overnight and since morning. no issues reported.

## 2018-01-17 NOTE — DISCHARGE NOTE ADULT - PLAN OF CARE
Continue with home meds as directed. Follow up with your primary doctor & psychiatry within 1 week of discharge Continue with home meds as directed. Follow up with your primary doctor Maintain Gama. The catheter was changed on 1/11/18.  Maintain gama with monthly changes.  Do not treat pt with antibiotics for a positive UA or urine culture unless he has symptoms, fever, leukocytosis. Likely colonization of Hardin. Do not treat pt with antibiotics for a positive UA or urine culture unless he has symptoms, fever, leukocytosis. Maintain free water by mouth 250cc  4 times a day. Follow up with your primary doctor for BMP check in 1 week resolved

## 2018-01-17 NOTE — PROGRESS NOTE BEHAVIORAL HEALTH - NSBHCONSFOLLOWNEEDS_PSY_A_CORE
Patient needs further psychiatric safety assessment prior to discharge
no psychiatric contraindications to discharge
Patient needs further psychiatric safety assessment prior to discharge
Patient needs further psychiatric safety assessment prior to discharge

## 2018-01-17 NOTE — PROGRESS NOTE BEHAVIORAL HEALTH - NSBHCONSULTMEDAGITATION_PSY_A_CORE FT
Haldol 1 mg po prn as needed, if refused and continues to be agitated to be given IM. Avoid benzodiapepine/cogentin/benadryl

## 2018-01-17 NOTE — PROGRESS NOTE BEHAVIORAL HEALTH - RISK ASSESSMENT
unpredictable behavior, although calm at the moment
Patient calm, has not been agitated, no depression, psychosis, and No S/H I/I/P.
CHronic risk due to hx of aggression. Acute risk due to medical illness  however current patient calm, compliant with medications, not at imminent risk of harm to self or others.
unpredictable behavior, although calm at the moment

## 2018-01-17 NOTE — DISCHARGE NOTE ADULT - PATIENT PORTAL LINK FT
“You can access the [facility] Patient Portal, offered by [facility], by registering with the following website: [email or web address]”

## 2018-01-17 NOTE — PROGRESS NOTE BEHAVIORAL HEALTH - SUMMARY
Mr. Vega presented with agitation and erratic behavior at rehab, UTI and hypernatremia, has been in medical floor since 1/11,  Delirium is resolving. He is not psychotic, depressed, manic, suicidal or homicidal.  Currently psychiatrically cleared for discharge back to facility

## 2018-01-17 NOTE — PROGRESS NOTE BEHAVIORAL HEALTH - ORIENTATION OTHER
states it is 2007 does not know month, does not knows season, does not know which hospital he is in
states it is 2017 does not know month, knows season, does not know which hospital he is in
states it is 2007 does not know month, does not knows season, does not know which hospital he is in
states it is 2007 does not know month, does not knows season, does not know which hospital he is in

## 2018-01-17 NOTE — DISCHARGE NOTE ADULT - HOSPITAL COURSE
67M presented from the rehabilitation facility for agitation. The patient was sedated on interview and history was supplemented through review of available documentation from the rehabilitation facility, emergency department staff, and behavioral health assessment. The patient was note to be agitated and belligerent at the rehabilitation facility. He was also noted to be combative. Most recently he had refused medications. There was no mention of fevers or dyspnea/cough. The patient was also noted to have been recently admitted for a urinary tract infection and has a chronic indwelling Gama catheter for urinary retention. Further information was otherwise limited. The patient denied any chest pain, dyspnea, or abdominal pain but did admit to lack of appetite. On presentation, WBC(10.6), H/H(9.5/30.7), Na(149), BUN/Cr(27/1.9). Urinalysis was noted to be abnormal and antibiotics were initiated for urinary tract infection. The patient was seen by Psychiatry in consultation and thought to benefit from inpatient psychiatric treatment once medically stable. Repeat laboratory results noted worsening hypernatremia and hypotonic intravenous fluids was initiated.    ASSESSMENT / PLAN:  ----------------------------------------  Hypernatremia- completed 0.45% NS, added free water. Repeat laboratory studies at NH     Mild Urinary tract infection - completed antibiotics course . Afebrile. No leukocytosis    Urinary retention - Chronic Gama catheter in place and draining clear urine. No signs of hematuria. Called urology consult as per daughter's request due to recurrent urinary retention, chronic gama & recurrent UTI, Maintain Gama. The catheter was changed on 1/11/18.Maintain gama with monthly changes.  Do not treat pt with antibiotics for a positive UA or urine culture unless he has symptoms, fever, leukocytosis.    Bipolar disorder - Behavioral Health consultation noted. On quetiapine and divalproex, with lorazepam as needed. Cleared by psych for discharge to NH    Hypothyroidism - On levothyroxine.    Hypertension - Close blood pressure monitoring. On propranolol and amlodipine.    Diabetes - Insulin coverage, close monitoring of blood glucose levels.    DVT ppx        Attending Attestation:   Disccussed with pt's daughter Naz in details [phone].     50 minutes spent on total encounter; more than 50% of the visit was spent counseling and/or coordinating care by the attending physician.     General appearance: NAD, Awake, Alert x 2, appears calm  HEENT: NCAT, Conjunctiva clear, EOMI  Neck: Supple, No JVD, No tenderness  Lungs: Clear to auscultation, Breath sound equal bilaterally, No wheezes, No rales  Cardiovascular: S1S2, Regular rhythm  Abdomen: Soft, Nontender, Nondistended, No guarding/rebound, Positive bowel sounds  Extremities: No clubbing, No cyanosis, No edema, No calf tenderness  Neuro: Strength equal bilaterally, No tremors  Psychiatric: Appropriate mood, Normal affect  + Chronic Gama with clear urine    VSS. Medically stable for discharge 67M presented from the rehabilitation facility for agitation. The patient was sedated on interview and history was supplemented through review of available documentation from the rehabilitation facility, emergency department staff, and behavioral health assessment. The patient was note to be agitated and belligerent at the rehabilitation facility. He was also noted to be combative. Most recently he had refused medications. There was no mention of fevers or dyspnea/cough. The patient was also noted to have been recently admitted for a urinary tract infection and has a chronic indwelling Gama catheter for urinary retention. Further information was otherwise limited. The patient denied any chest pain, dyspnea, or abdominal pain but did admit to lack of appetite. On presentation, WBC(10.6), H/H(9.5/30.7), Na(149), BUN/Cr(27/1.9). Urinalysis was noted to be abnormal and antibiotics were initiated for urinary tract infection. The patient was seen by Psychiatry in consultation and thought to benefit from inpatient psychiatric treatment once medically stable. Repeat laboratory results noted worsening hypernatremia and hypotonic intravenous fluids was initiated.    Blanching, Macular rash seen on RUE- Pt denies any pain or itching. Afebrile. NO leukocytosis / left shift. ?contact dermatitis. Seen by derm who thinks it might be contact dermatitis, aquaphor recommended.     Hypernatremia-resolved, c/w 0.45% NS & free water. f/u BMP, Repeat laboratory studies at NH     Mild Urinary tract infection - completed antibiotics course . Afebrile. No leukocytosis    Urinary retention - Chronic Gama catheter in place and draining clear urine. No signs of hematuria. Called urology consult as per daughter's request due to recurrent urinary retention, chronic gama & recurrent UTI, Maintain Gama. The catheter was changed on 1/11/18.Maintain gama with monthly changes.  Do not treat pt with antibiotics for a positive UA or urine culture unless he has symptoms, fever, leukocytosis.    Bipolar disorder - Behavioral Health consultation noted. On quetiapine and divalproex, with lorazepam as needed. Cleared by psych for discharge to NH    Hypothyroidism - On levothyroxine.    Hypertension - Close blood pressure monitoring. On propranolol and amlodipine.    Diabetes - Insulin coverage, close monitoring of blood glucose levels.    Hypomagnesemia- replaced    DVT ppx  Attending Attestation:   Disccussed with pt's daughter Naz in details [phone].     50 minutes spent on total encounter; more than 50% of the visit was spent counseling and/or coordinating care by the attending physician.     General appearance: NAD, Awake, Alert x 2, appears calm  HEENT: NCAT, Conjunctiva clear, EOMI  Neck: Supple, No JVD, No tenderness  Lungs: Clear to auscultation, Breath sound equal bilaterally, No wheezes, No rales  Cardiovascular: S1S2, Regular rhythm  Abdomen: Soft, Nontender, Nondistended, No guarding/rebound, Positive bowel sounds  Extremities: No clubbing, No cyanosis, No edema, No calf tenderness  Neuro: Strength equal bilaterally, No tremors  Psychiatric: Appropriate mood, Normal affect  + Chronic Gama with clear urine  Skin: Blanching, Macular rash seen on RUE      VSS. Medically stable for discharge 67M presented from the rehabilitation facility for agitation. The patient was sedated on interview and history was supplemented through review of available documentation from the rehabilitation facility, emergency department staff, and behavioral health assessment. The patient was note to be agitated and belligerent at the rehabilitation facility. He was also noted to be combative. Most recently he had refused medications. There was no mention of fevers or dyspnea/cough. The patient was also noted to have been recently admitted for a urinary tract infection and has a chronic indwelling Gama catheter for urinary retention. Further information was otherwise limited. The patient denied any chest pain, dyspnea, or abdominal pain but did admit to lack of appetite. On presentation, WBC(10.6), H/H(9.5/30.7), Na(149), BUN/Cr(27/1.9). Urinalysis was noted to be abnormal and antibiotics were initiated for urinary tract infection. The patient was seen by Psychiatry in consultation and thought to benefit from inpatient psychiatric treatment once medically stable. Repeat laboratory results noted worsening hypernatremia and hypotonic intravenous fluids was initiated.    Blanching, Macular rash seen on RUE- Pt denies any pain or itching. Afebrile. NO leukocytosis / left shift. ?contact dermatitis. Seen by derm who thinks it might be contact dermatitis, aquaphor recommended.     Hypernatremia-resolved, c/w 0.45% NS & free water. f/u BMP, Repeat laboratory studies at NH     Mild Urinary tract infection - completed antibiotics course . Afebrile. No leukocytosis    Urinary retention - Chronic Gama catheter in place and draining clear urine. No signs of hematuria. Called urology consult as per daughter's request due to recurrent urinary retention, chronic gama & recurrent UTI, Maintain Gama. The catheter was changed on 1/11/18.Maintain gama with monthly changes.  Do not treat pt with antibiotics for a positive UA or urine culture unless he has symptoms, fever, leukocytosis.    Bipolar disorder - Behavioral Health consultation noted. On quetiapine and divalproex, with lorazepam as needed. Cleared by psych for discharge to NH    Hypothyroidism - On levothyroxine.    Hypertension - Close blood pressure monitoring. On propranolol and amlodipine.    Diabetes - Insulin coverage, close monitoring of blood glucose levels.    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 As per dermatology this area be photographed by the staff and followed.  If new ones appear, he should be seen by a dermatologist.  There is no contraindication to his discharge if medically he is otherwise cleared. Aquaphor to the area is recommended    DVT ppx  Attending Attestation:   Disccussed with pt's daughter Naz in details [phone].     50 minutes spent on total encounter; more than 50% of the visit was spent counseling and/or coordinating care by the attending physician.     General appearance: NAD, Awake, Alert x 2, appears calm  HEENT: NCAT, Conjunctiva clear, EOMI  Neck: Supple, No JVD, No tenderness  Lungs: Clear to auscultation, Breath sound equal bilaterally, No wheezes, No rales  Cardiovascular: S1S2, Regular rhythm  Abdomen: Soft, Nontender, Nondistended, No guarding/rebound, Positive bowel sounds  Extremities: No clubbing, No cyanosis, No edema, No calf tenderness  Neuro: Strength equal bilaterally, No tremors  Psychiatric: Appropriate mood, Normal affect  + Chronic Gama with clear urine  Skin: Blanching, Macular rash seen on RUE      VSS. Medically stable for discharge

## 2018-01-17 NOTE — DISCHARGE NOTE ADULT - CARE PROVIDERS DIRECT ADDRESSES
,DirectAddress_Unknown,DirectAddress_Unknown ,DirectAddress_Unknown,DirectAddress_Unknown,[email or web address]

## 2018-01-17 NOTE — DISCHARGE NOTE ADULT - SECONDARY DIAGNOSIS.
Bipolar 1 disorder HTN (hypertension) Urinary retention Urinary tract infection associated with indwelling urethral catheter, initial encounter Hypernatremia Type 2 diabetes mellitus with complication, unspecified long term insulin use status

## 2018-01-17 NOTE — PHYSICAL THERAPY INITIAL EVALUATION ADULT - PERTINENT HX OF CURRENT PROBLEM, REHAB EVAL
Pt is a 66 y/o male who presented from [facility] with agitation.

## 2018-01-17 NOTE — PROGRESS NOTE BEHAVIORAL HEALTH - AXIS III
GERD, HTN, HLD, hypothyroidism, BPH, DM, hyperparathyroidism

## 2018-01-17 NOTE — CONSULT NOTE ADULT - PROBLEM SELECTOR RECOMMENDATION 2
The patient is being treated for UTI for positive Ucx.  However, there are no symptoms.  Positive Ucx likely secondary to colonization.  No need to treat colonization.    Will sign off.

## 2018-01-17 NOTE — PHYSICAL THERAPY INITIAL EVALUATION ADULT - PLANNED THERAPY INTERVENTIONS, PT EVAL
balance training/bed mobility training/gait training/postural re-education/strengthening/transfer training

## 2018-01-17 NOTE — PROGRESS NOTE BEHAVIORAL HEALTH - NSBHCHARTREVIEWVS_PSY_A_CORE FT
Vital Signs Last 24 Hrs  T(C): 36.7 (16 Jan 2018 07:13), Max: 36.7 (16 Jan 2018 07:13)  T(F): 98 (16 Jan 2018 07:13), Max: 98 (16 Jan 2018 07:13)  HR: 62 (16 Jan 2018 07:13) (62 - 70)  BP: 125/68 (16 Jan 2018 07:13) (125/68 - 176/72)  BP(mean): --  RR: 16 (16 Jan 2018 07:13) (16 - 18)  SpO2: 97% (16 Jan 2018 07:13) (97% - 99%)
Vital Signs Last 24 Hrs  T(C): 36.7 (16 Jan 2018 07:13), Max: 36.7 (16 Jan 2018 07:13)  T(F): 98 (16 Jan 2018 07:13), Max: 98 (16 Jan 2018 07:13)  HR: 62 (16 Jan 2018 07:13) (62 - 70)  BP: 125/68 (16 Jan 2018 07:13) (125/68 - 176/72)  BP(mean): --  RR: 16 (16 Jan 2018 07:13) (16 - 18)  SpO2: 97% (16 Jan 2018 07:13) (97% - 99%)
Vital Signs Last 24 Hrs  T(C): 36.3 (12 Jan 2018 08:12), Max: 37.3 (11 Jan 2018 16:03)  T(F): 97.4 (12 Jan 2018 08:12), Max: 99.2 (11 Jan 2018 16:03)  HR: 76 (12 Jan 2018 08:12) (65 - 80)  BP: 142/76 (12 Jan 2018 08:12) (142/76 - 175/78)  BP(mean): --  RR: 18 (12 Jan 2018 08:12) (18 - 18)  SpO2: 96% (12 Jan 2018 08:12) (96% - 100%)

## 2018-01-17 NOTE — CONSULT NOTE ADULT - PROBLEM SELECTOR RECOMMENDATION 9
Patient apparently has a chronic gama catheter.  The catheter was changed on 1/11/18.  Maintain gama with monthly changes

## 2018-01-18 LAB
ANION GAP SERPL CALC-SCNC: 15 MMOL/L — SIGNIFICANT CHANGE UP (ref 5–17)
BUN SERPL-MCNC: 20 MG/DL — SIGNIFICANT CHANGE UP (ref 8–20)
CALCIUM SERPL-MCNC: 9 MG/DL — SIGNIFICANT CHANGE UP (ref 8.6–10.2)
CHLORIDE SERPL-SCNC: 110 MMOL/L — HIGH (ref 98–107)
CO2 SERPL-SCNC: 22 MMOL/L — SIGNIFICANT CHANGE UP (ref 22–29)
CREAT SERPL-MCNC: 1.45 MG/DL — HIGH (ref 0.5–1.3)
EOSINOPHIL # BLD AUTO: 0.2 K/UL — SIGNIFICANT CHANGE UP (ref 0–0.5)
EOSINOPHIL NFR BLD AUTO: 3 % — SIGNIFICANT CHANGE UP (ref 0–6)
GLUCOSE BLDC GLUCOMTR-MCNC: 111 MG/DL — HIGH (ref 70–99)
GLUCOSE BLDC GLUCOMTR-MCNC: 175 MG/DL — HIGH (ref 70–99)
GLUCOSE BLDC GLUCOMTR-MCNC: 190 MG/DL — HIGH (ref 70–99)
GLUCOSE BLDC GLUCOMTR-MCNC: 81 MG/DL — SIGNIFICANT CHANGE UP (ref 70–99)
GLUCOSE SERPL-MCNC: 112 MG/DL — SIGNIFICANT CHANGE UP (ref 70–115)
HCT VFR BLD CALC: 28.7 % — LOW (ref 42–52)
HGB BLD-MCNC: 9.4 G/DL — LOW (ref 14–18)
HYPOCHROMIA BLD QL: SLIGHT — SIGNIFICANT CHANGE UP
LYMPHOCYTES # BLD AUTO: 0.7 K/UL — LOW (ref 1–4.8)
LYMPHOCYTES # BLD AUTO: 11 % — LOW (ref 20–55)
MAGNESIUM SERPL-MCNC: 1.6 MG/DL — SIGNIFICANT CHANGE UP (ref 1.6–2.6)
MCHC RBC-ENTMCNC: 29.7 PG — SIGNIFICANT CHANGE UP (ref 27–31)
MCHC RBC-ENTMCNC: 32.8 G/DL — SIGNIFICANT CHANGE UP (ref 32–36)
MCV RBC AUTO: 90.8 FL — SIGNIFICANT CHANGE UP (ref 80–94)
MONOCYTES # BLD AUTO: 0.4 K/UL — SIGNIFICANT CHANGE UP (ref 0–0.8)
MONOCYTES NFR BLD AUTO: 7 % — SIGNIFICANT CHANGE UP (ref 3–10)
NEUTROPHILS # BLD AUTO: 4.9 K/UL — SIGNIFICANT CHANGE UP (ref 1.8–8)
NEUTROPHILS NFR BLD AUTO: 75 % — HIGH (ref 37–73)
NEUTS BAND # BLD: 4 % — SIGNIFICANT CHANGE UP (ref 0–8)
OVALOCYTES BLD QL SMEAR: SLIGHT — SIGNIFICANT CHANGE UP
PHOSPHATE SERPL-MCNC: 3.1 MG/DL — SIGNIFICANT CHANGE UP (ref 2.4–4.7)
PLAT MORPH BLD: NORMAL — SIGNIFICANT CHANGE UP
PLATELET # BLD AUTO: 176 K/UL — SIGNIFICANT CHANGE UP (ref 150–400)
POTASSIUM SERPL-MCNC: 3.4 MMOL/L — LOW (ref 3.5–5.3)
POTASSIUM SERPL-SCNC: 3.4 MMOL/L — LOW (ref 3.5–5.3)
RBC # BLD: 3.16 M/UL — LOW (ref 4.6–6.2)
RBC # FLD: 15.8 % — HIGH (ref 11–15.6)
RBC BLD AUTO: ABNORMAL
SODIUM SERPL-SCNC: 147 MMOL/L — HIGH (ref 135–145)
WBC # BLD: 6.3 K/UL — SIGNIFICANT CHANGE UP (ref 4.8–10.8)
WBC # FLD AUTO: 6.3 K/UL — SIGNIFICANT CHANGE UP (ref 4.8–10.8)

## 2018-01-18 PROCEDURE — 99233 SBSQ HOSP IP/OBS HIGH 50: CPT

## 2018-01-18 RX ORDER — POTASSIUM CHLORIDE 20 MEQ
40 PACKET (EA) ORAL ONCE
Qty: 0 | Refills: 0 | Status: COMPLETED | OUTPATIENT
Start: 2018-01-18 | End: 2018-01-18

## 2018-01-18 RX ORDER — DIPHENHYDRAMINE HCL 50 MG
25 CAPSULE ORAL ONCE
Qty: 0 | Refills: 0 | Status: COMPLETED | OUTPATIENT
Start: 2018-01-18 | End: 2018-01-18

## 2018-01-18 RX ADMIN — Medication 75 MICROGRAM(S): at 05:53

## 2018-01-18 RX ADMIN — SODIUM CHLORIDE 60 MILLILITER(S): 9 INJECTION, SOLUTION INTRAVENOUS at 03:18

## 2018-01-18 RX ADMIN — ATORVASTATIN CALCIUM 40 MILLIGRAM(S): 80 TABLET, FILM COATED ORAL at 21:09

## 2018-01-18 RX ADMIN — DIVALPROEX SODIUM 250 MILLIGRAM(S): 500 TABLET, DELAYED RELEASE ORAL at 05:52

## 2018-01-18 RX ADMIN — AMLODIPINE BESYLATE 10 MILLIGRAM(S): 2.5 TABLET ORAL at 05:52

## 2018-01-18 RX ADMIN — Medication 50 MILLIGRAM(S): at 05:53

## 2018-01-18 RX ADMIN — QUETIAPINE FUMARATE 100 MILLIGRAM(S): 200 TABLET, FILM COATED ORAL at 21:09

## 2018-01-18 RX ADMIN — CINACALCET 30 MILLIGRAM(S): 30 TABLET, FILM COATED ORAL at 17:38

## 2018-01-18 RX ADMIN — Medication 1 GRAM(S): at 16:54

## 2018-01-18 RX ADMIN — Medication: at 12:23

## 2018-01-18 RX ADMIN — CINACALCET 30 MILLIGRAM(S): 30 TABLET, FILM COATED ORAL at 05:52

## 2018-01-18 RX ADMIN — Medication 50 MILLIGRAM(S): at 16:46

## 2018-01-18 RX ADMIN — TAMSULOSIN HYDROCHLORIDE 0.4 MILLIGRAM(S): 0.4 CAPSULE ORAL at 21:09

## 2018-01-18 RX ADMIN — Medication 1 GRAM(S): at 05:53

## 2018-01-18 RX ADMIN — Medication 50 MILLIGRAM(S): at 21:09

## 2018-01-18 RX ADMIN — QUETIAPINE FUMARATE 25 MILLIGRAM(S): 200 TABLET, FILM COATED ORAL at 17:39

## 2018-01-18 RX ADMIN — PANTOPRAZOLE SODIUM 40 MILLIGRAM(S): 20 TABLET, DELAYED RELEASE ORAL at 05:53

## 2018-01-18 RX ADMIN — Medication 40 MILLIEQUIVALENT(S): at 16:46

## 2018-01-18 RX ADMIN — Medication 25 MILLIGRAM(S): at 23:43

## 2018-01-18 RX ADMIN — Medication 20 MILLIGRAM(S): at 05:53

## 2018-01-18 RX ADMIN — Medication 20 MILLIGRAM(S): at 17:38

## 2018-01-18 RX ADMIN — QUETIAPINE FUMARATE 25 MILLIGRAM(S): 200 TABLET, FILM COATED ORAL at 05:53

## 2018-01-18 RX ADMIN — Medication 10 MILLIGRAM(S): at 19:53

## 2018-01-18 RX ADMIN — Medication 1 GRAM(S): at 21:09

## 2018-01-18 RX ADMIN — DIVALPROEX SODIUM 250 MILLIGRAM(S): 500 TABLET, DELAYED RELEASE ORAL at 17:38

## 2018-01-18 RX ADMIN — INSULIN GLARGINE 22 UNIT(S): 100 INJECTION, SOLUTION SUBCUTANEOUS at 21:08

## 2018-01-18 RX ADMIN — ENOXAPARIN SODIUM 40 MILLIGRAM(S): 100 INJECTION SUBCUTANEOUS at 12:23

## 2018-01-18 NOTE — CHART NOTE - NSCHARTNOTEFT_GEN_A_CORE
Called by RN to assess pt with a rash.  Pt was receiving IV fluids thru peripheral IV placed on dorsum hand.  Pt pulled it out.  RN noticed new rash on ventral forearm.  Pt denying any pruritus, pain. VSS.  On exam, pt has a blanching erythematous multiple nonraised annular lesions on ventral FA.  rash not noted anywhere else.  ? Allergic Dermatitis.  Benadryl 25 mg po ordered.  marked Rash to monitor for expansion.  Continue to monitor and reassess prn.

## 2018-01-18 NOTE — PROGRESS NOTE ADULT - SUBJECTIVE AND OBJECTIVE BOX
CHIEF COMPLAINT/INTERVAL HISTORY:    Patient is a 67y old  Male who presents with a chief complaint of Agitation (17 Jan 2018 16:39)      HPI:  67M presented from the rehabilitation facility for agitation. The patient was sedated on interview and history was supplemented through review of available documentation from the rehabilitation facility, emergency department staff, and behavioral health assessment. The patient was note to be agitated and belligerent at the rehabilitation facility. He was also noted to be combative. Most recently he had refused medications. There was no mention of fevers or dyspnea/cough. The patient was also noted to have been recently admitted for a urinary tract infection and has a chronic indwelling Hardin catheter for urinary retention. Further information was otherwise limited. The patient denied any chest pain, dyspnea, or abdominal pain but did admit to lack of appetite. (11 Jan 2018 10:01)      SUBJECTIVE & OBJECTIVE/ ROS: Pt seen and examined at bedside. No chest pain, palpitations, sob, light headedness/dizziness, difficulty breathing/cough, fevers/chills, abdominal pain, n/v, diarrhea/constipation, dysuria or increased urinary frequency.     ICU Vital Signs Last 24 Hrs  T(C): 36.5 (18 Jan 2018 07:00), Max: 37.1 (17 Jan 2018 22:16)  T(F): 97.7 (18 Jan 2018 07:00), Max: 98.8 (17 Jan 2018 22:16)  HR: 61 (18 Jan 2018 07:00) (61 - 73)  BP: 140/78 (18 Jan 2018 07:00) (140/78 - 160/81)  BP(mean): --  ABP: --  ABP(mean): --  RR: 18 (18 Jan 2018 07:00) (18 - 18)  SpO2: 99% (18 Jan 2018 07:00) (95% - 99%)        MEDICATIONS  (STANDING):  amLODIPine   Tablet 10 milliGRAM(s) Oral daily  atorvastatin 40 milliGRAM(s) Oral at bedtime  cinacalcet 30 milliGRAM(s) Oral two times a day  dextrose 5%. 1000 milliLiter(s) (50 mL/Hr) IV Continuous <Continuous>  dextrose 50% Injectable 12.5 Gram(s) IV Push once  dextrose 50% Injectable 25 Gram(s) IV Push once  dextrose 50% Injectable 25 Gram(s) IV Push once  diVALproex  milliGRAM(s) Oral two times a day  enoxaparin Injectable 40 milliGRAM(s) SubCutaneous daily  hydrALAZINE 50 milliGRAM(s) Oral every 8 hours  insulin glargine Injectable (LANTUS) 22 Unit(s) SubCutaneous at bedtime  insulin lispro (HumaLOG) corrective regimen sliding scale   SubCutaneous three times a day before meals  levothyroxine 75 MICROGram(s) Oral daily  pantoprazole    Tablet 40 milliGRAM(s) Oral before breakfast  potassium chloride   Powder 40 milliEquivalent(s) Oral once  propranolol 20 milliGRAM(s) Oral two times a day  QUEtiapine 100 milliGRAM(s) Oral at bedtime  QUEtiapine 25 milliGRAM(s) Oral two times a day  sodium chloride 0.45%. 1000 milliLiter(s) (80 mL/Hr) IV Continuous <Continuous>  sucralfate suspension 1 Gram(s) Oral four times a day before meals  tamsulosin 0.4 milliGRAM(s) Oral at bedtime    MEDICATIONS  (PRN):  dextrose Gel 1 Dose(s) Oral once PRN Blood Glucose LESS THAN 70 milliGRAM(s)/deciliter  glucagon  Injectable 1 milliGRAM(s) IntraMuscular once PRN Glucose LESS THAN 70 milligrams/deciliter  haloperidol    Injectable 2.5 milliGRAM(s) IntraMuscular every 6 hours PRN Agitation  hydrALAZINE Injectable 10 milliGRAM(s) IV Push every 6 hours PRN for SBP > 150      LABS:                        9.4    6.3   )-----------( 176      ( 18 Jan 2018 07:56 )             28.7     01-18    147<H>  |  110<H>  |  20.0  ----------------------------<  112  3.4<L>   |  22.0  |  1.45<H>    Ca    9.0      18 Jan 2018 07:56  Phos  3.1     01-18  Mg     1.6     01-18            CAPILLARY BLOOD GLUCOSE      POCT Blood Glucose.: 190 mg/dL (18 Jan 2018 11:55)  POCT Blood Glucose.: 111 mg/dL (18 Jan 2018 07:52)  POCT Blood Glucose.: 121 mg/dL (17 Jan 2018 21:27)  POCT Blood Glucose.: 106 mg/dL (17 Jan 2018 16:57)      RECENT CULTURES:      RADIOLOGY & ADDITIONAL TESTS:      PHYSICAL EXAM:    General appearance: NAD, Awake, Alert x 2, appears calm  HEENT: NCAT, Conjunctiva clear, EOMI  Neck: Supple, No JVD, No tenderness  Lungs: Clear to auscultation, Breath sound equal bilaterally, No wheezes, No rales  Cardiovascular: S1S2, Regular rhythm  Abdomen: Soft, Nontender, Nondistended, No guarding/rebound, Positive bowel sounds  Extremities: No clubbing, No cyanosis, No edema, No calf tenderness  Neuro: Strength equal bilaterally, No tremors  Psychiatric: Appropriate mood, Normal affect  + Chronic Hardin with clear urine

## 2018-01-19 LAB
ANION GAP SERPL CALC-SCNC: 12 MMOL/L — SIGNIFICANT CHANGE UP (ref 5–17)
ANION GAP SERPL CALC-SCNC: 15 MMOL/L — SIGNIFICANT CHANGE UP (ref 5–17)
BASOPHILS # BLD AUTO: 0 K/UL — SIGNIFICANT CHANGE UP (ref 0–0.2)
BASOPHILS NFR BLD AUTO: 0.5 % — SIGNIFICANT CHANGE UP (ref 0–2)
BUN SERPL-MCNC: 20 MG/DL — SIGNIFICANT CHANGE UP (ref 8–20)
BUN SERPL-MCNC: 22 MG/DL — HIGH (ref 8–20)
CALCIUM SERPL-MCNC: 8.4 MG/DL — LOW (ref 8.6–10.2)
CALCIUM SERPL-MCNC: 8.6 MG/DL — SIGNIFICANT CHANGE UP (ref 8.6–10.2)
CHLORIDE SERPL-SCNC: 110 MMOL/L — HIGH (ref 98–107)
CHLORIDE SERPL-SCNC: 110 MMOL/L — HIGH (ref 98–107)
CO2 SERPL-SCNC: 20 MMOL/L — LOW (ref 22–29)
CO2 SERPL-SCNC: 23 MMOL/L — SIGNIFICANT CHANGE UP (ref 22–29)
CREAT SERPL-MCNC: 1.63 MG/DL — HIGH (ref 0.5–1.3)
CREAT SERPL-MCNC: 1.71 MG/DL — HIGH (ref 0.5–1.3)
EOSINOPHIL # BLD AUTO: 0.2 K/UL — SIGNIFICANT CHANGE UP (ref 0–0.5)
EOSINOPHIL NFR BLD AUTO: 4.4 % — SIGNIFICANT CHANGE UP (ref 0–5)
GLUCOSE BLDC GLUCOMTR-MCNC: 106 MG/DL — HIGH (ref 70–99)
GLUCOSE BLDC GLUCOMTR-MCNC: 149 MG/DL — HIGH (ref 70–99)
GLUCOSE BLDC GLUCOMTR-MCNC: 169 MG/DL — HIGH (ref 70–99)
GLUCOSE BLDC GLUCOMTR-MCNC: 231 MG/DL — HIGH (ref 70–99)
GLUCOSE SERPL-MCNC: 126 MG/DL — HIGH (ref 70–115)
GLUCOSE SERPL-MCNC: 194 MG/DL — HIGH (ref 70–115)
HCT VFR BLD CALC: 26.2 % — LOW (ref 42–52)
HGB BLD-MCNC: 8.5 G/DL — LOW (ref 14–18)
LYMPHOCYTES # BLD AUTO: 0.9 K/UL — LOW (ref 1–4.8)
LYMPHOCYTES # BLD AUTO: 22.5 % — SIGNIFICANT CHANGE UP (ref 20–55)
MAGNESIUM SERPL-MCNC: 1.7 MG/DL — LOW (ref 1.8–2.6)
MCHC RBC-ENTMCNC: 29.5 PG — SIGNIFICANT CHANGE UP (ref 27–31)
MCHC RBC-ENTMCNC: 32.4 G/DL — SIGNIFICANT CHANGE UP (ref 32–36)
MCV RBC AUTO: 91 FL — SIGNIFICANT CHANGE UP (ref 80–94)
MONOCYTES # BLD AUTO: 0.5 K/UL — SIGNIFICANT CHANGE UP (ref 0–0.8)
MONOCYTES NFR BLD AUTO: 11.4 % — HIGH (ref 3–10)
NEUTROPHILS # BLD AUTO: 2.4 K/UL — SIGNIFICANT CHANGE UP (ref 1.8–8)
NEUTROPHILS NFR BLD AUTO: 59.3 % — SIGNIFICANT CHANGE UP (ref 37–73)
PHOSPHATE SERPL-MCNC: 3 MG/DL — SIGNIFICANT CHANGE UP (ref 2.4–4.7)
PLATELET # BLD AUTO: 202 K/UL — SIGNIFICANT CHANGE UP (ref 150–400)
POTASSIUM SERPL-MCNC: 3.5 MMOL/L — SIGNIFICANT CHANGE UP (ref 3.5–5.3)
POTASSIUM SERPL-MCNC: 3.6 MMOL/L — SIGNIFICANT CHANGE UP (ref 3.5–5.3)
POTASSIUM SERPL-SCNC: 3.5 MMOL/L — SIGNIFICANT CHANGE UP (ref 3.5–5.3)
POTASSIUM SERPL-SCNC: 3.6 MMOL/L — SIGNIFICANT CHANGE UP (ref 3.5–5.3)
RBC # BLD: 2.88 M/UL — LOW (ref 4.6–6.2)
RBC # FLD: 15.8 % — HIGH (ref 11–15.6)
SODIUM SERPL-SCNC: 145 MMOL/L — SIGNIFICANT CHANGE UP (ref 135–145)
SODIUM SERPL-SCNC: 145 MMOL/L — SIGNIFICANT CHANGE UP (ref 135–145)
WBC # BLD: 4.1 K/UL — LOW (ref 4.8–10.8)
WBC # FLD AUTO: 4.1 K/UL — LOW (ref 4.8–10.8)

## 2018-01-19 PROCEDURE — 99222 1ST HOSP IP/OBS MODERATE 55: CPT

## 2018-01-19 PROCEDURE — 99233 SBSQ HOSP IP/OBS HIGH 50: CPT

## 2018-01-19 RX ORDER — MAGNESIUM SULFATE 500 MG/ML
1 VIAL (ML) INJECTION ONCE
Qty: 0 | Refills: 0 | Status: COMPLETED | OUTPATIENT
Start: 2018-01-19 | End: 2018-01-19

## 2018-01-19 RX ORDER — PETROLATUM,WHITE
1 JELLY (GRAM) TOPICAL DAILY
Qty: 0 | Refills: 0 | Status: DISCONTINUED | OUTPATIENT
Start: 2018-01-19 | End: 2018-01-20

## 2018-01-19 RX ADMIN — Medication 2: at 16:57

## 2018-01-19 RX ADMIN — Medication 1 GRAM(S): at 13:15

## 2018-01-19 RX ADMIN — CINACALCET 30 MILLIGRAM(S): 30 TABLET, FILM COATED ORAL at 17:01

## 2018-01-19 RX ADMIN — Medication 20 MILLIGRAM(S): at 05:50

## 2018-01-19 RX ADMIN — Medication 1 APPLICATION(S): at 21:23

## 2018-01-19 RX ADMIN — SODIUM CHLORIDE 80 MILLILITER(S): 9 INJECTION, SOLUTION INTRAVENOUS at 08:43

## 2018-01-19 RX ADMIN — Medication 50 MILLIGRAM(S): at 21:23

## 2018-01-19 RX ADMIN — QUETIAPINE FUMARATE 100 MILLIGRAM(S): 200 TABLET, FILM COATED ORAL at 21:23

## 2018-01-19 RX ADMIN — DIVALPROEX SODIUM 250 MILLIGRAM(S): 500 TABLET, DELAYED RELEASE ORAL at 17:02

## 2018-01-19 RX ADMIN — TAMSULOSIN HYDROCHLORIDE 0.4 MILLIGRAM(S): 0.4 CAPSULE ORAL at 21:23

## 2018-01-19 RX ADMIN — Medication 50 MILLIGRAM(S): at 13:16

## 2018-01-19 RX ADMIN — DIVALPROEX SODIUM 250 MILLIGRAM(S): 500 TABLET, DELAYED RELEASE ORAL at 05:51

## 2018-01-19 RX ADMIN — QUETIAPINE FUMARATE 25 MILLIGRAM(S): 200 TABLET, FILM COATED ORAL at 05:51

## 2018-01-19 RX ADMIN — AMLODIPINE BESYLATE 10 MILLIGRAM(S): 2.5 TABLET ORAL at 05:50

## 2018-01-19 RX ADMIN — Medication 1 GRAM(S): at 21:23

## 2018-01-19 RX ADMIN — Medication 75 MICROGRAM(S): at 05:50

## 2018-01-19 RX ADMIN — Medication 50 MILLIGRAM(S): at 05:51

## 2018-01-19 RX ADMIN — QUETIAPINE FUMARATE 25 MILLIGRAM(S): 200 TABLET, FILM COATED ORAL at 17:02

## 2018-01-19 RX ADMIN — INSULIN GLARGINE 22 UNIT(S): 100 INJECTION, SOLUTION SUBCUTANEOUS at 21:23

## 2018-01-19 RX ADMIN — Medication 20 MILLIGRAM(S): at 17:02

## 2018-01-19 RX ADMIN — CINACALCET 30 MILLIGRAM(S): 30 TABLET, FILM COATED ORAL at 05:50

## 2018-01-19 RX ADMIN — SODIUM CHLORIDE 80 MILLILITER(S): 9 INJECTION, SOLUTION INTRAVENOUS at 16:51

## 2018-01-19 RX ADMIN — PANTOPRAZOLE SODIUM 40 MILLIGRAM(S): 20 TABLET, DELAYED RELEASE ORAL at 05:50

## 2018-01-19 RX ADMIN — Medication 100 GRAM(S): at 14:58

## 2018-01-19 RX ADMIN — Medication 1 GRAM(S): at 16:58

## 2018-01-19 RX ADMIN — SODIUM CHLORIDE 80 MILLILITER(S): 9 INJECTION, SOLUTION INTRAVENOUS at 14:58

## 2018-01-19 RX ADMIN — SODIUM CHLORIDE 80 MILLILITER(S): 9 INJECTION, SOLUTION INTRAVENOUS at 13:16

## 2018-01-19 RX ADMIN — ATORVASTATIN CALCIUM 40 MILLIGRAM(S): 80 TABLET, FILM COATED ORAL at 21:23

## 2018-01-19 RX ADMIN — Medication 1 GRAM(S): at 05:50

## 2018-01-19 NOTE — PROGRESS NOTE ADULT - ASSESSMENT
67M presented from the rehabilitation facility for agitation. The patient was sedated on interview and history was supplemented through review of available documentation from the rehabilitation facility, emergency department staff, and behavioral health assessment. The patient was note to be agitated and belligerent at the rehabilitation facility. He was also noted to be combative. Most recently he had refused medications. There was no mention of fevers or dyspnea/cough. The patient was also noted to have been recently admitted for a urinary tract infection and has a chronic indwelling Gama catheter for urinary retention. Further information was otherwise limited. The patient denied any chest pain, dyspnea, or abdominal pain but did admit to lack of appetite. On presentation, WBC(10.6), H/H(9.5/30.7), Na(149), BUN/Cr(27/1.9). Urinalysis was noted to be abnormal and antibiotics were initiated for urinary tract infection. The patient was seen by Psychiatry in consultation and thought to benefit from inpatient psychiatric treatment once medically stable. Repeat laboratory results noted worsening hypernatremia and hypotonic intravenous fluids was initiated.    ASSESSMENT / PLAN:  ----------------------------------------    Blanching, Macular rash seen on RUE- Pt denies any pain or itching. Afebrile. NO leukocytosis / left shift. ?contact dermatitis. Will call dermatology for further eval    Hypernatremia-resolved, c/w 0.45% NS & free water. f/u BMP, Repeat laboratory studies at NH     Mild Urinary tract infection - completed antibiotics course . Afebrile. No leukocytosis    Urinary retention - Chronic Gama catheter in place and draining clear urine. No signs of hematuria. Called urology consult as per daughter's request due to recurrent urinary retention, chronic gama & recurrent UTI, Maintain Gama. The catheter was changed on 1/11/18.Maintain gama with monthly changes.  Do not treat pt with antibiotics for a positive UA or urine culture unless he has symptoms, fever, leukocytosis.    Bipolar disorder - Behavioral Health consultation noted. On quetiapine and divalproex, with lorazepam as needed. Cleared by psych for discharge to NH    Hypothyroidism - On levothyroxine.    Hypertension - Close blood pressure monitoring. On propranolol and amlodipine.    Diabetes - Insulin coverage, close monitoring of blood glucose levels.    Hypomagnesemia- replaced    DVT ppx    D/c tomorrow to NH after seen by derm
67M presented from the rehabilitation facility for agitation. The patient was sedated on interview and history was supplemented through review of available documentation from the rehabilitation facility, emergency department staff, and behavioral health assessment. The patient was note to be agitated and belligerent at the rehabilitation facility. He was also noted to be combative. Most recently he had refused medications. There was no mention of fevers or dyspnea/cough. The patient was also noted to have been recently admitted for a urinary tract infection and has a chronic indwelling Gama catheter for urinary retention. Further information was otherwise limited. The patient denied any chest pain, dyspnea, or abdominal pain but did admit to lack of appetite. On presentation, WBC(10.6), H/H(9.5/30.7), Na(149), BUN/Cr(27/1.9). Urinalysis was noted to be abnormal and antibiotics were initiated for urinary tract infection. The patient was seen by Psychiatry in consultation and thought to benefit from inpatient psychiatric treatment once medically stable. Repeat laboratory results noted worsening hypernatremia and hypotonic intravenous fluids was initiated.    ASSESSMENT / PLAN:  ----------------------------------------  Hypernatremia- c/w 0.45% NS & free water. f/u BMP, Repeat laboratory studies at NH     Mild Urinary tract infection - completed antibiotics course . Afebrile. No leukocytosis    Urinary retention - Chronic Gama catheter in place and draining clear urine. No signs of hematuria. Called urology consult as per daughter's request due to recurrent urinary retention, chronic gama & recurrent UTI, Maintain Gama. The catheter was changed on 1/11/18.Maintain gama with monthly changes.  Do not treat pt with antibiotics for a positive UA or urine culture unless he has symptoms, fever, leukocytosis.    Bipolar disorder - Behavioral Health consultation noted. On quetiapine and divalproex, with lorazepam as needed. Cleared by psych for discharge to NH    Hypothyroidism - On levothyroxine.    Hypertension - Close blood pressure monitoring. On propranolol and amlodipine.    Diabetes - Insulin coverage, close monitoring of blood glucose levels.    DVT ppx    D/c tomorrow after Na normalizes
67M presented from the rehabilitation facility for agitation. The patient was sedated on interview and history was supplemented through review of available documentation from the rehabilitation facility, emergency department staff, and behavioral health assessment. The patient was note to be agitated and belligerent at the rehabilitation facility. He was also noted to be combative. Most recently he had refused medications. There was no mention of fevers or dyspnea/cough. The patient was also noted to have been recently admitted for a urinary tract infection and has a chronic indwelling Gama catheter for urinary retention. Further information was otherwise limited. The patient denied any chest pain, dyspnea, or abdominal pain but did admit to lack of appetite. On presentation, WBC(10.6), H/H(9.5/30.7), Na(149), BUN/Cr(27/1.9). Urinalysis was noted to be abnormal and antibiotics were initiated for urinary tract infection. The patient was seen by Psychiatry in consultation and thought to benefit from inpatient psychiatric treatment once medically stable. Repeat laboratory results noted worsening hypernatremia and hypotonic intravenous fluids was initiated.    ASSESSMENT / PLAN:  ----------------------------------------  Hypernatremia- c/w 0.45% NS, added free water. Repeat laboratory studies ordered to monitor.    Mild Urinary tract infection - completed antibiotics course . Afebrile. No leukocytosis    Urinary retention - Chronic Gama catheter in place and draining clear urine. No signs of hematuria. Called urology consult as per daughter's request due to recurrent urinary retention, chronic gama & recurrent UTI, will see in pt in am    Bipolar disorder - Behavioral Health consultation noted. On quetiapine and divalproex, with lorazepam as needed. Might need inpatient psych on d/c, discussed with psych    Hypothyroidism - On levothyroxine.    Hypertension - Close blood pressure monitoring. On propranolol and amlodipine.    Diabetes - Insulin coverage, close monitoring of blood glucose levels.    DVT ppx
67M presented from the rehabilitation facility for agitation. The patient was sedated on interview and history was supplemented through review of available documentation from the rehabilitation facility, emergency department staff, and behavioral health assessment. The patient was note to be agitated and belligerent at the rehabilitation facility. He was also noted to be combative. Most recently he had refused medications. There was no mention of fevers or dyspnea/cough. The patient was also noted to have been recently admitted for a urinary tract infection and has a chronic indwelling Hardin catheter for urinary retention. Further information was otherwise limited. The patient denied any chest pain, dyspnea, or abdominal pain but did admit to lack of appetite. On presentation, WBC(10.6), H/H(9.5/30.7), Na(149), BUN/Cr(27/1.9). Urinalysis was noted to be abnormal and antibiotics were initiated for urinary tract infection. The patient was seen by Psychiatry in consultation and thought to benefit from inpatient psychiatric treatment once medically stable. Repeat laboratory results noted worsening hypernatremia and hypotonic intravenous fluids was initiated.    ASSESSMENT / PLAN:  ----------------------------------------  Hypernatremia, resolved- c/w Intravenous fluids initiated. Repeat laboratory studies ordered to monitor.    Mild Urinary tract infection - c/w antibiotics day 4 . Afebrile.    Urinary retention - Chronic Hardin catheter in place and draining clear urine. No signs of hematuria.    Bipolar disorder - Behavioral Health consultation noted. On quetiapine and divalproex, with lorazepam as needed. Might need inpatient psych on d/c    Hypothyroidism - On levothyroxine.    Hypertension - Close blood pressure monitoring. On propranolol and amlodipine.    Diabetes - Insulin coverage, close monitoring of blood glucose levels.    DVT ppx
67M presented from the rehabilitation facility for agitation. The patient was sedated on interview and history was supplemented through review of available documentation from the rehabilitation facility, emergency department staff, and behavioral health assessment. The patient was note to be agitated and belligerent at the rehabilitation facility. He was also noted to be combative. Most recently he had refused medications. There was no mention of fevers or dyspnea/cough. The patient was also noted to have been recently admitted for a urinary tract infection and has a chronic indwelling Hardin catheter for urinary retention. Further information was otherwise limited. The patient denied any chest pain, dyspnea, or abdominal pain but did admit to lack of appetite. On presentation, WBC(10.6), H/H(9.5/30.7), Na(149), BUN/Cr(27/1.9). Urinalysis was noted to be abnormal and antibiotics were initiated for urinary tract infection. The patient was seen by Psychiatry in consultation and thought to benefit from inpatient psychiatric treatment once medically stable. Repeat laboratory results noted worsening hypernatremia and hypotonic intravenous fluids was initiated.    ASSESSMENT / PLAN:  ----------------------------------------  Hypernatremia- c/w 0.45% NS, added free water. Repeat laboratory studies ordered to monitor.    Mild Urinary tract infection - c/w antibiotics day 5 . Afebrile.    Urinary retention - Chronic Hardin catheter in place and draining clear urine. No signs of hematuria.    Bipolar disorder - Behavioral Health consultation noted. On quetiapine and divalproex, with lorazepam as needed. Might need inpatient psych on d/c, discussed with psych    Hypothyroidism - On levothyroxine.    Hypertension - Close blood pressure monitoring. On propranolol and amlodipine.    Diabetes - Insulin coverage, close monitoring of blood glucose levels.    DVT ppx

## 2018-01-19 NOTE — CONSULT NOTE ADULT - SUBJECTIVE AND OBJECTIVE BOX
HPI:  67M presented from the rehabilitation facility for agitation. The patient was sedated on interview and history was supplemented through review of available documentation from the rehabilitation facility, emergency department staff, and behavioral health assessment. The patient was noted to be agitated and belligerent at the rehabilitation facility. He was also noted to be combative. Most recently he had refused medications. There was no mention of fevers or dyspnea/cough. The patient was also noted to have been recently admitted for a urinary tract infection and has a chronic indwelling Hardin catheter for urinary retention. Further information was otherwise limited. The patient denied any chest pain, dyspnea, or abdominal pain but did admit to lack of appetite. (11 Jan 2018 10:01)    I am being asked to see him due to red areas on his right forearm.  Patient states that these have been there for about 2 weeks, but not sure if his history is reliable.  He states that these do not itch, hurt and that they did not result from a thermal burn, chemical burn and states that he did not manipulate the area.    He has a long history of anemia of chronic disease, chronic renal insufficiency.      PAST MEDICAL & SURGICAL HISTORY:  HTN (hypertension)  Urinary retention  Renal impairment  Bipolar 1 disorder  S/P TURP      REVIEW OF SYSTEMS:    CONSTITUTIONAL: No fever, weight loss, or fatigue.  He is eager to leave the hospital, but does not recall where he will be going or where he was living prior to his admission to [facility].                 MEDICATIONS  (STANDING):  amLODIPine   Tablet 10 milliGRAM(s) Oral daily  AQUAPHOR (petrolatum Ointment) 1 Application(s) Topical daily  atorvastatin 40 milliGRAM(s) Oral at bedtime  cinacalcet 30 milliGRAM(s) Oral two times a day  dextrose 5%. 1000 milliLiter(s) (50 mL/Hr) IV Continuous <Continuous>  dextrose 50% Injectable 12.5 Gram(s) IV Push once  dextrose 50% Injectable 25 Gram(s) IV Push once  dextrose 50% Injectable 25 Gram(s) IV Push once  diVALproex  milliGRAM(s) Oral two times a day  enoxaparin Injectable 40 milliGRAM(s) SubCutaneous daily  hydrALAZINE 50 milliGRAM(s) Oral every 8 hours  insulin glargine Injectable (LANTUS) 22 Unit(s) SubCutaneous at bedtime  insulin lispro (HumaLOG) corrective regimen sliding scale   SubCutaneous three times a day before meals  levothyroxine 75 MICROGram(s) Oral daily  pantoprazole    Tablet 40 milliGRAM(s) Oral before breakfast  propranolol 20 milliGRAM(s) Oral two times a day  QUEtiapine 100 milliGRAM(s) Oral at bedtime  QUEtiapine 25 milliGRAM(s) Oral two times a day  sodium chloride 0.45%. 1000 milliLiter(s) (80 mL/Hr) IV Continuous <Continuous>  sucralfate suspension 1 Gram(s) Oral four times a day before meals  tamsulosin 0.4 milliGRAM(s) Oral at bedtime    MEDICATIONS  (PRN):  dextrose Gel 1 Dose(s) Oral once PRN Blood Glucose LESS THAN 70 milliGRAM(s)/deciliter  glucagon  Injectable 1 milliGRAM(s) IntraMuscular once PRN Glucose LESS THAN 70 milligrams/deciliter  haloperidol    Injectable 2.5 milliGRAM(s) IntraMuscular every 6 hours PRN Agitation  hydrALAZINE Injectable 10 milliGRAM(s) IV Push every 6 hours PRN for SBP > 150      Allergies    No Known Allergies    FAMILY HISTORY:  No pertinent family history in first degree relatives      Vital Signs Last 24 Hrs  T(C): 36.5 (19 Jan 2018 07:50), Max: 36.7 (19 Jan 2018 05:15)  T(F): 97.7 (19 Jan 2018 07:50), Max: 98 (19 Jan 2018 05:15)  HR: 69 (19 Jan 2018 07:50) (69 - 92)  BP: 144/66 (19 Jan 2018 07:50) (125/76 - 164/105)  BP(mean): --  RR: 19 (19 Jan 2018 07:50) (18 - 19)  SpO2: 98% (19 Jan 2018 07:50) (97% - 98%)    PHYSICAL EXAM:    GENERAL: NAD well-developed  HEAD:  Atraumatic, Normocephalic  EYES: EOMI, PERRLA, conjunctiva and sclera clear  EXTREMITIES:  2+ Peripheral Pulses, No clubbing, cyanosis, or edema  LYMPH: No lymphadenopathy noted  SKIN EXAM:  several erythematous 0.9-2 cm non warm non tender non vesicular macules with rounded contours on right volar forearm with a few mildly dry crusted similar areas on his distal dorsal right forearm near wrist. No nail disease and, aside from a few evenly colored brown small macules on his anterior chest (benign melanocytic nevi) the rest of his skin exam was normal including his feet and genitals.      LABS:                        8.5    4.1   )-----------( 202      ( 19 Jan 2018 06:43 )             26.2     01-19    145  |  110<H>  |  22.0<H>  ----------------------------<  126<H>  3.6   |  23.0  |  1.63<H>    Ca    8.4<L>      19 Jan 2018 06:43  Phos  3.0     01-19  Mg     1.7     01-19
HPI:  67M presented from the rehabilitation facility for agitation. The patient was sedated on interview and history was supplemented through review of available documentation from the rehabilitation facility, emergency department staff, and behavioral health assessment. The patient was note to be agitated and belligerent at the rehabilitation facility. He was also noted to be combative. Most recently he had refused medications. There was no mention of fevers or dyspnea/cough. The patient was also noted to have been recently admitted for a urinary tract infection and has a chronic indwelling Agma catheter for urinary retention. Further information was otherwise limited. The patient denied any chest pain, dyspnea, or abdominal pain but did admit to lack of appetite. (11 Jan 2018 10:01)    Urologic consultation called for chronic gama/UTI.  The patient is unable to provide accurate history.  Apparently, as per the patient, he has had the gama in for a while.  Upon chart review, the gama was changed in the ER on this admission on 1/11/18.  Ucx findings noted.  No hematuria. No complaints of pain.      PAST MEDICAL & SURGICAL HISTORY:  HTN (hypertension)  Urinary retention  Renal impairment  Bipolar 1 disorder  S/P TURP      REVIEW OF SYSTEMS:    Unable to perform due to mental status    MEDICATIONS  (STANDING):  amLODIPine   Tablet 10 milliGRAM(s) Oral daily  atorvastatin 40 milliGRAM(s) Oral at bedtime  cinacalcet 30 milliGRAM(s) Oral two times a day  dextrose 5%. 1000 milliLiter(s) (50 mL/Hr) IV Continuous <Continuous>  dextrose 50% Injectable 12.5 Gram(s) IV Push once  dextrose 50% Injectable 25 Gram(s) IV Push once  dextrose 50% Injectable 25 Gram(s) IV Push once  diVALproex  milliGRAM(s) Oral two times a day  enoxaparin Injectable 40 milliGRAM(s) SubCutaneous daily  hydrALAZINE 50 milliGRAM(s) Oral every 8 hours  insulin glargine Injectable (LANTUS) 22 Unit(s) SubCutaneous at bedtime  insulin lispro (HumaLOG) corrective regimen sliding scale   SubCutaneous three times a day before meals  levothyroxine 75 MICROGram(s) Oral daily  pantoprazole    Tablet 40 milliGRAM(s) Oral before breakfast  propranolol 20 milliGRAM(s) Oral two times a day  QUEtiapine 100 milliGRAM(s) Oral at bedtime  QUEtiapine 25 milliGRAM(s) Oral two times a day  sodium chloride 0.45%. 1000 milliLiter(s) (100 mL/Hr) IV Continuous <Continuous>  sucralfate suspension 1 Gram(s) Oral four times a day before meals  tamsulosin 0.4 milliGRAM(s) Oral at bedtime    MEDICATIONS  (PRN):  dextrose Gel 1 Dose(s) Oral once PRN Blood Glucose LESS THAN 70 milliGRAM(s)/deciliter  glucagon  Injectable 1 milliGRAM(s) IntraMuscular once PRN Glucose LESS THAN 70 milligrams/deciliter  haloperidol    Injectable 2.5 milliGRAM(s) IntraMuscular every 6 hours PRN Agitation  hydrALAZINE Injectable 10 milliGRAM(s) IV Push every 6 hours PRN for SBP > 150  LORazepam     Tablet 1 milliGRAM(s) Oral every 6 hours PRN Agitation      Allergies    No Known Allergies    Intolerances        SOCIAL HISTORY:    FAMILY HISTORY:  No pertinent family history in first degree relatives      Vital Signs Last 24 Hrs  T(C): 35.5 (17 Jan 2018 07:17), Max: 36.4 (17 Jan 2018 05:06)  T(F): 95.9 (17 Jan 2018 07:17), Max: 97.6 (17 Jan 2018 05:06)  HR: 72 (17 Jan 2018 07:17) (69 - 74)  BP: 144/77 (17 Jan 2018 07:17) (123/73 - 148/69)  BP(mean): --  RR: 16 (17 Jan 2018 07:17) (16 - 17)  SpO2: 100% (17 Jan 2018 07:17) (97% - 100%)    PHYSICAL EXAM:    General: Well developed; well nourished; in no acute distress  Head: Normocephalic; atraumatic  Respiratory: No tachypnea  Gastrointestinal: Soft non-tender non-distended;   Genitourinary: No costovertebral angle tenderness.  Urinary bladder is clinically not distended  Testes: NT, no masses  Gama draining clear  Extremities: Normal range of motion, No edema  Neurological: confused  Skin: Warm and dry. No acute rash  Psychiatric: Cooperative      LABS:                        9.0    4.7   )-----------( 149      ( 16 Jan 2018 08:23 )             28.3     01-16    146<H>  |  113<H>  |  15.0  ----------------------------<  78  3.6   |  19.0<L>  |  1.42<H>    Ca    8.9      16 Jan 2018 08:23  Phos  3.0     01-16  Mg     1.7     01-16            RADIOLOGY & ADDITIONAL STUDIES:

## 2018-01-19 NOTE — PROGRESS NOTE ADULT - SUBJECTIVE AND OBJECTIVE BOX
CHIEF COMPLAINT/INTERVAL HISTORY:    Patient is a 67y old  Male who presents with a chief complaint of Agitation (17 Jan 2018 16:39)      HPI:  67M presented from the rehabilitation facility for agitation. The patient was sedated on interview and history was supplemented through review of available documentation from the rehabilitation facility, emergency department staff, and behavioral health assessment. The patient was note to be agitated and belligerent at the rehabilitation facility. He was also noted to be combative. Most recently he had refused medications. There was no mention of fevers or dyspnea/cough. The patient was also noted to have been recently admitted for a urinary tract infection and has a chronic indwelling Hardin catheter for urinary retention. Further information was otherwise limited. The patient denied any chest pain, dyspnea, or abdominal pain but did admit to lack of appetite. (11 Jan 2018 10:01)      SUBJECTIVE & OBJECTIVE/ ROS: Pt seen and examined at bedside.  Macular rash seen on RUE today. Pt denies any pain or itching. Afebrile. NO leukocytosis / left shift. No other overnight issues reported.     ICU Vital Signs Last 24 Hrs  T(C): 36.5 (19 Jan 2018 07:50), Max: 36.7 (19 Jan 2018 05:15)  T(F): 97.7 (19 Jan 2018 07:50), Max: 98 (19 Jan 2018 05:15)  HR: 69 (19 Jan 2018 07:50) (69 - 92)  BP: 144/66 (19 Jan 2018 07:50) (125/76 - 164/105)  BP(mean): --  ABP: --  ABP(mean): --  RR: 19 (19 Jan 2018 07:50) (18 - 19)  SpO2: 98% (19 Jan 2018 07:50) (97% - 99%)        MEDICATIONS  (STANDING):  amLODIPine   Tablet 10 milliGRAM(s) Oral daily  atorvastatin 40 milliGRAM(s) Oral at bedtime  cinacalcet 30 milliGRAM(s) Oral two times a day  dextrose 5%. 1000 milliLiter(s) (50 mL/Hr) IV Continuous <Continuous>  dextrose 50% Injectable 12.5 Gram(s) IV Push once  dextrose 50% Injectable 25 Gram(s) IV Push once  dextrose 50% Injectable 25 Gram(s) IV Push once  diVALproex  milliGRAM(s) Oral two times a day  enoxaparin Injectable 40 milliGRAM(s) SubCutaneous daily  hydrALAZINE 50 milliGRAM(s) Oral every 8 hours  insulin glargine Injectable (LANTUS) 22 Unit(s) SubCutaneous at bedtime  insulin lispro (HumaLOG) corrective regimen sliding scale   SubCutaneous three times a day before meals  levothyroxine 75 MICROGram(s) Oral daily  pantoprazole    Tablet 40 milliGRAM(s) Oral before breakfast  propranolol 20 milliGRAM(s) Oral two times a day  QUEtiapine 100 milliGRAM(s) Oral at bedtime  QUEtiapine 25 milliGRAM(s) Oral two times a day  sodium chloride 0.45%. 1000 milliLiter(s) (80 mL/Hr) IV Continuous <Continuous>  sucralfate suspension 1 Gram(s) Oral four times a day before meals  tamsulosin 0.4 milliGRAM(s) Oral at bedtime    MEDICATIONS  (PRN):  dextrose Gel 1 Dose(s) Oral once PRN Blood Glucose LESS THAN 70 milliGRAM(s)/deciliter  glucagon  Injectable 1 milliGRAM(s) IntraMuscular once PRN Glucose LESS THAN 70 milligrams/deciliter  haloperidol    Injectable 2.5 milliGRAM(s) IntraMuscular every 6 hours PRN Agitation  hydrALAZINE Injectable 10 milliGRAM(s) IV Push every 6 hours PRN for SBP > 150      LABS:                        8.5    4.1   )-----------( 202      ( 19 Jan 2018 06:43 )             26.2     01-19    145  |  110<H>  |  22.0<H>  ----------------------------<  126<H>  3.6   |  23.0  |  1.63<H>    Ca    8.4<L>      19 Jan 2018 06:43  Phos  3.0     01-19  Mg     1.7     01-19            CAPILLARY BLOOD GLUCOSE      POCT Blood Glucose.: 149 mg/dL (19 Jan 2018 11:25)  POCT Blood Glucose.: 106 mg/dL (19 Jan 2018 07:56)  POCT Blood Glucose.: 175 mg/dL (18 Jan 2018 20:58)  POCT Blood Glucose.: 81 mg/dL (18 Jan 2018 16:52)      RECENT CULTURES:      RADIOLOGY & ADDITIONAL TESTS:      PHYSICAL EXAM:    General appearance: NAD, Awake, Alert x 2, appears calm  HEENT: NCAT, Conjunctiva clear, EOMI  Neck: Supple, No JVD, No tenderness  Lungs: Clear to auscultation, Breath sound equal bilaterally, No wheezes, No rales  Cardiovascular: S1S2, Regular rhythm  Abdomen: Soft, Nontender, Nondistended, No guarding/rebound, Positive bowel sounds  Extremities: No clubbing, No cyanosis, No edema, No calf tenderness  Neuro: Strength equal bilaterally, No tremors  Psychiatric: Appropriate mood, Normal affect  + Chronic Hardin with clear urine  Skin: Blanching, Macular rash seen on RUE

## 2018-01-19 NOTE — CONSULT NOTE ADULT - ASSESSMENT
These lesions appear to be healing irritant reactions--the list of potential causes include self manipulation (a scraping type of superficial abrasion) vs thermal burn vs chemical burn vs a focal irritant contact dermatitis due to strong soap that was no sufficiently washed off.  They are non specific, non contagious and non malignant.  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  Phytophotodermatitis or fixed drug eruptions can also present in this manner but phytophotodermatitis is caused by contact with photosensitizing substances, such as lime oil, parsnip, celery then exposure to sunlight--very unlikely here.  Fixed drug eruptions are usually fewer in number and have a bullous or dusky appearance.  I would suggest that this area be photographed by the staff and followed.  If new ones appear, he should be seen by a dermatologist.  There is no contraindication to his discharge if medically he is otherwise cleared.

## 2018-01-20 VITALS
RESPIRATION RATE: 18 BRPM | OXYGEN SATURATION: 98 % | TEMPERATURE: 97 F | SYSTOLIC BLOOD PRESSURE: 133 MMHG | DIASTOLIC BLOOD PRESSURE: 67 MMHG | HEART RATE: 66 BPM

## 2018-01-20 LAB
ANION GAP SERPL CALC-SCNC: 12 MMOL/L — SIGNIFICANT CHANGE UP (ref 5–17)
BASOPHILS # BLD AUTO: 0 K/UL — SIGNIFICANT CHANGE UP (ref 0–0.2)
BASOPHILS NFR BLD AUTO: 0.3 % — SIGNIFICANT CHANGE UP (ref 0–2)
BUN SERPL-MCNC: 21 MG/DL — HIGH (ref 8–20)
CALCIUM SERPL-MCNC: 8.6 MG/DL — SIGNIFICANT CHANGE UP (ref 8.6–10.2)
CHLORIDE SERPL-SCNC: 109 MMOL/L — HIGH (ref 98–107)
CO2 SERPL-SCNC: 24 MMOL/L — SIGNIFICANT CHANGE UP (ref 22–29)
CREAT SERPL-MCNC: 1.48 MG/DL — HIGH (ref 0.5–1.3)
EOSINOPHIL # BLD AUTO: 0.2 K/UL — SIGNIFICANT CHANGE UP (ref 0–0.5)
EOSINOPHIL NFR BLD AUTO: 3.8 % — SIGNIFICANT CHANGE UP (ref 0–5)
GLUCOSE BLDC GLUCOMTR-MCNC: 189 MG/DL — HIGH (ref 70–99)
GLUCOSE BLDC GLUCOMTR-MCNC: 89 MG/DL — SIGNIFICANT CHANGE UP (ref 70–99)
GLUCOSE SERPL-MCNC: 180 MG/DL — HIGH (ref 70–115)
HCT VFR BLD CALC: 28.8 % — LOW (ref 42–52)
HGB BLD-MCNC: 9.3 G/DL — LOW (ref 14–18)
LYMPHOCYTES # BLD AUTO: 0.9 K/UL — LOW (ref 1–4.8)
LYMPHOCYTES # BLD AUTO: 13.8 % — LOW (ref 20–55)
MAGNESIUM SERPL-MCNC: 1.8 MG/DL — SIGNIFICANT CHANGE UP (ref 1.6–2.6)
MCHC RBC-ENTMCNC: 29.5 PG — SIGNIFICANT CHANGE UP (ref 27–31)
MCHC RBC-ENTMCNC: 32.3 G/DL — SIGNIFICANT CHANGE UP (ref 32–36)
MCV RBC AUTO: 91.4 FL — SIGNIFICANT CHANGE UP (ref 80–94)
MONOCYTES # BLD AUTO: 0.7 K/UL — SIGNIFICANT CHANGE UP (ref 0–0.8)
MONOCYTES NFR BLD AUTO: 11.4 % — HIGH (ref 3–10)
NEUTROPHILS # BLD AUTO: 4.5 K/UL — SIGNIFICANT CHANGE UP (ref 1.8–8)
NEUTROPHILS NFR BLD AUTO: 69.8 % — SIGNIFICANT CHANGE UP (ref 37–73)
PHOSPHATE SERPL-MCNC: 3 MG/DL — SIGNIFICANT CHANGE UP (ref 2.4–4.7)
PLATELET # BLD AUTO: 204 K/UL — SIGNIFICANT CHANGE UP (ref 150–400)
POTASSIUM SERPL-MCNC: 3.6 MMOL/L — SIGNIFICANT CHANGE UP (ref 3.5–5.3)
POTASSIUM SERPL-SCNC: 3.6 MMOL/L — SIGNIFICANT CHANGE UP (ref 3.5–5.3)
RBC # BLD: 3.15 M/UL — LOW (ref 4.6–6.2)
RBC # FLD: 16 % — HIGH (ref 11–15.6)
SODIUM SERPL-SCNC: 145 MMOL/L — SIGNIFICANT CHANGE UP (ref 135–145)
WBC # BLD: 6.5 K/UL — SIGNIFICANT CHANGE UP (ref 4.8–10.8)
WBC # FLD AUTO: 6.5 K/UL — SIGNIFICANT CHANGE UP (ref 4.8–10.8)

## 2018-01-20 PROCEDURE — 85730 THROMBOPLASTIN TIME PARTIAL: CPT

## 2018-01-20 PROCEDURE — 99285 EMERGENCY DEPT VISIT HI MDM: CPT | Mod: 25

## 2018-01-20 PROCEDURE — 36415 COLL VENOUS BLD VENIPUNCTURE: CPT

## 2018-01-20 PROCEDURE — 84100 ASSAY OF PHOSPHORUS: CPT

## 2018-01-20 PROCEDURE — 99239 HOSP IP/OBS DSCHRG MGMT >30: CPT

## 2018-01-20 PROCEDURE — 82962 GLUCOSE BLOOD TEST: CPT

## 2018-01-20 PROCEDURE — 80048 BASIC METABOLIC PNL TOTAL CA: CPT

## 2018-01-20 PROCEDURE — 87186 SC STD MICRODIL/AGAR DIL: CPT

## 2018-01-20 PROCEDURE — 80053 COMPREHEN METABOLIC PANEL: CPT

## 2018-01-20 PROCEDURE — 85610 PROTHROMBIN TIME: CPT

## 2018-01-20 PROCEDURE — 83735 ASSAY OF MAGNESIUM: CPT

## 2018-01-20 PROCEDURE — 81001 URINALYSIS AUTO W/SCOPE: CPT

## 2018-01-20 PROCEDURE — 85027 COMPLETE CBC AUTOMATED: CPT

## 2018-01-20 PROCEDURE — 96372 THER/PROPH/DIAG INJ SC/IM: CPT | Mod: XU

## 2018-01-20 PROCEDURE — 87086 URINE CULTURE/COLONY COUNT: CPT

## 2018-01-20 PROCEDURE — 93005 ELECTROCARDIOGRAM TRACING: CPT

## 2018-01-20 PROCEDURE — 71045 X-RAY EXAM CHEST 1 VIEW: CPT

## 2018-01-20 PROCEDURE — 96374 THER/PROPH/DIAG INJ IV PUSH: CPT

## 2018-01-20 PROCEDURE — 80164 ASSAY DIPROPYLACETIC ACD TOT: CPT

## 2018-01-20 RX ORDER — PETROLATUM,WHITE
1 JELLY (GRAM) TOPICAL
Qty: 0 | Refills: 0 | COMMUNITY
Start: 2018-01-20

## 2018-01-20 RX ADMIN — PANTOPRAZOLE SODIUM 40 MILLIGRAM(S): 20 TABLET, DELAYED RELEASE ORAL at 05:57

## 2018-01-20 RX ADMIN — Medication 50 MILLIGRAM(S): at 13:05

## 2018-01-20 RX ADMIN — Medication 1 GRAM(S): at 11:28

## 2018-01-20 RX ADMIN — Medication 75 MICROGRAM(S): at 05:58

## 2018-01-20 RX ADMIN — Medication 50 MILLIGRAM(S): at 05:57

## 2018-01-20 RX ADMIN — Medication 1 GRAM(S): at 05:58

## 2018-01-20 RX ADMIN — Medication 1 APPLICATION(S): at 11:28

## 2018-01-20 RX ADMIN — SODIUM CHLORIDE 80 MILLILITER(S): 9 INJECTION, SOLUTION INTRAVENOUS at 05:59

## 2018-01-20 RX ADMIN — SODIUM CHLORIDE 80 MILLILITER(S): 9 INJECTION, SOLUTION INTRAVENOUS at 11:28

## 2018-01-20 RX ADMIN — ENOXAPARIN SODIUM 40 MILLIGRAM(S): 100 INJECTION SUBCUTANEOUS at 11:28

## 2018-01-20 RX ADMIN — AMLODIPINE BESYLATE 10 MILLIGRAM(S): 2.5 TABLET ORAL at 05:58

## 2018-01-20 RX ADMIN — QUETIAPINE FUMARATE 25 MILLIGRAM(S): 200 TABLET, FILM COATED ORAL at 05:58

## 2018-01-20 RX ADMIN — DIVALPROEX SODIUM 250 MILLIGRAM(S): 500 TABLET, DELAYED RELEASE ORAL at 05:58

## 2018-01-20 RX ADMIN — CINACALCET 30 MILLIGRAM(S): 30 TABLET, FILM COATED ORAL at 05:57

## 2018-01-20 RX ADMIN — Medication 20 MILLIGRAM(S): at 05:58

## 2018-01-20 RX ADMIN — Medication 2: at 11:27

## 2018-01-25 ENCOUNTER — APPOINTMENT (OUTPATIENT)
Dept: NEUROSURGERY | Facility: CLINIC | Age: 68
End: 2018-01-25

## 2018-04-20 NOTE — PATIENT PROFILE ADULT. - TEACHING/LEARNING LEARNING PREFERENCES
Anesthetic History     PONV          Review of Systems / Medical History  Patient summary reviewed, nursing notes reviewed and pertinent labs reviewed    Pulmonary          Smoker         Neuro/Psych   Within defined limits           Cardiovascular  Within defined limits                     GI/Hepatic/Renal  Within defined limits     Hepatitis         Endo/Other  Within defined limits           Other Findings   Comments:  Thrombocytopenia, unspecified           Physical Exam    Airway  Mallampati: II  TM Distance: > 6 cm  Neck ROM: normal range of motion   Mouth opening: Normal     Cardiovascular  Regular rate and rhythm,  S1 and S2 normal,  no murmur, click, rub, or gallop             Dental  No notable dental hx       Pulmonary  Breath sounds clear to auscultation               Abdominal  GI exam deferred       Other Findings            Anesthetic Plan    ASA: 2  Anesthesia type: general          Induction: Intravenous  Anesthetic plan and risks discussed with: Patient
written material/skill demonstration/individual instruction

## 2018-04-24 NOTE — CONSULT NOTE ADULT - SUBJECTIVE AND OBJECTIVE BOX
Patient is a 67y old  Male who presents with a chief complaint of     68 y/o male pmhx of Bipolar, HTN, DM, [facility] from [facility] with right sided facial droop and left wrist drop that began this morning, symptoms began over 4 hours from arriving to hospital so not a candidate for TPA. Pt was hospitalized in september for "UTI and kidney failure from being on Lithium for over 20 years," according to patients daughter. Since that hospitalization and he was taken off lithium he had a steady functional decline, and now is in a nursing home needing help w/ ADL's, however he has never been formally diagnosed w/ dementia. Last week, patient was alert and able to hold a conversation w/ daughter.  All history obtained from daughter at bedside. Pt sitting in ED now/ awake and alert able, following commands w/ expressive aphasia. Pt started on nicardipine gtt from SBP >220.       PAST MEDICAL & SURGICAL HISTORY:  HTN (hypertension)  Urinary retention  Renal impairment  Bipolar 1 disorder  S/P TURP    Allergies    No Known Allergies    Intolerances      FAMILY HISTORY:  No pertinent family history in first degree relatives      Review of Systems: unable to obtain         Medications:    labetalol Injectable 20 milliGRAM(s) IV Push Once  labetalol Injectable 10 milliGRAM(s) IV Push every 2 hours PRN  niCARdipine Infusion 5 mG/Hr IV Continuous <Continuous>        ICU Vital Signs Last 24 Hrs  T(C): 36.7 (20 Dec 2017 13:34), Max: 36.9 (20 Dec 2017 13:19)  T(F): 98 (20 Dec 2017 13:34), Max: 98.4 (20 Dec 2017 13:19)  HR: 84 (20 Dec 2017 14:51) (60 - 85)  BP: 181/90 (20 Dec 2017 16:06) (154/73 - 225/101)  BP(mean): --  ABP: --  ABP(mean): --  RR: 20 (20 Dec 2017 13:40) (16 - 20)  SpO2: 99% (20 Dec 2017 13:40) (97% - 100%)          I&O's Detail        LABS:                        9.3    10.2  )-----------( 220      ( 20 Dec 2017 13:37 )             29.2     12-20    142  |  106  |  28.0<H>  ----------------------------<  187<H>  4.7   |  21.0<L>  |  2.21<H>    Ca    10.1      20 Dec 2017 13:37    TPro  5.7<L>  /  Alb  3.2<L>  /  TBili  0.4  /  DBili  x   /  AST  14  /  ALT  10  /  AlkPhos  76  12-20      CARDIAC MARKERS ( 20 Dec 2017 13:37 )  x     / 0.02 ng/mL / 77 U/L / x     / x          CAPILLARY BLOOD GLUCOSE      POCT Blood Glucose.: 178 mg/dL (20 Dec 2017 13:17)    PT/INR - ( 20 Dec 2017 13:37 )   PT: 11.5 sec;   INR: 1.04 ratio         PTT - ( 20 Dec 2017 13:37 )  PTT:29.3 sec  Urinalysis Basic - ( 20 Dec 2017 15:05 )    Color: Yellow / Appearance: Clear / S.010 / pH: x  Gluc: x / Ketone: Negative  / Bili: Negative / Urobili: Negative mg/dL   Blood: x / Protein: 30 mg/dL / Nitrite: Negative   Leuk Esterase: Moderate / RBC: 0-2 /HPF / WBC 3-5   Sq Epi: x / Non Sq Epi: Occasional / Bacteria: x      CULTURES:      Physical Examination:    General: No acute distress.  Awake/alert, non responsive     HEENT: Pupils equal, reactive to light.  Symmetric.    PULM:  breath sounds b/l. no wheeze/rales/rhonci     CVS: +S1/S2, no  murmurs. NSR    ABD: Soft, nondistended, nontender, normoactive bowel sounds,     EXT: No edema, nontender    SKIN: Warm and well perfused, no rashes noted.    NEURO: awake/ alert, follows commands but unresponsive. 4/5 strength on right side. left side 3/5 strength, w/ left wrist drop.  No pronator drift. right sided facial drop     RADIOLOGY: < from: Xray Chest 1 View AP/PA. (17 @ 15:18) >  INTERPRETATION:  Portable chest radiograph        CLINICAL INFORMATION:   CVA. Code stroke.  Chest x-ray ordered as part of   standard stroke protocol /altered mental status workup at [facility] emergency department .      TECHNIQUE:  Portable  AP view of the chest was obtained.    COMPARISON: No previous examinations are available for review.    FINDINGS:   The lungs  are clear.  No pleural abnormality is seen.         The heart and mediastinum are within normal limits.         Visualized osseous structures are intact.        IMPRESSION:   No evidence of active chest disease.          < end of copied text >    < from: CT Head No Cont (17 @ 13:15) >  INTERPRETATION:      CLINICAL INFORMATION:   67-year-old assess for CVA     TECHNIQUE: Contiguous non contrast axial images of brain from skull base   to vertex sag[place] coronal reformations.   This scan was performed   using automatic exposure control (radiation dose reduction software) to   obtain a diagnostic image quality scan with patient dose as low as   reasonably achievable.      COMPARISON: No previous examinations are available for review.     FINDINGS:       There is prominence of the ventricles and cortical sulci. Midline   structures are intact. There are patchy hypodensities in periventricular   distribution consistent with chronic small vessel ischemic changes. There   is no CT evidence of acute territorial infarction.  There is no   hemorrhage, contusion or extraaxial collection. There is no acute   hydrocephalus. The visualized posterior fossa structures are intact.   There are scattered intracranial arterial calcification.  The visualized   paranasal sinuses are clear.    IMPRESSION:       No parenchymal contusion, hemorrhage or extra-axial collection.    No CT evidence of an acute territorial infarction.    If symptoms persist, and additional imaging evaluation is needed,   follow-up MRI is suggested.    < end of copied text >      CRITICAL CARE TIME SPENT: 35 min  Evaluating/treating patient, reviewing data/labs/imaging, discussing case with multidisciplinary team, discussing plan/goals of care with patient/family. Non-inclusive of procedure time. Patient returned call  States bleeding is a little less  She has saturated three pads today  They are soaking from side to side not front to back  She has passed minimal clots the size of pencil eraser here an there  Not symptomatic  Advised to monitor  Call if worsens  Verbalized understanding  Routing to provider to update  Patient is a 67y old  Male who presents with a chief complaint of     68 y/o male pmhx of Bipolar, HTN, DM, [facility] from [facility] with right sided facial droop and left wrist drop that began this morning, symptoms began over 4 hours from arriving to hospital so not a candidate for TPA. Pt was hospitalized in september for "UTI and kidney failure from being on Lithium for over 20 years," according to patients daughter. Since that hospitalization and he was taken off lithium he had a steady functional decline, and now is in a nursing home needing help w/ ADL's, however he has never been formally diagnosed w/ dementia. Last week, patient was alert and able to hold a conversation w/ daughter until a couple days ago where he became nonresponsive w/ a flat affect.  All history obtained from daughter at bedside. Pt sitting in ED now/ awake and alert able, following commands w/ expressive aphasia. Pt started on nicardipine gtt from SBP >220.       PAST MEDICAL & SURGICAL HISTORY:  HTN (hypertension)  Urinary retention  Renal impairment  Bipolar 1 disorder  S/P TURP    Allergies    No Known Allergies    Intolerances      FAMILY HISTORY:  No pertinent family history in first degree relatives      Review of Systems: unable to obtain         Medications:    labetalol Injectable 20 milliGRAM(s) IV Push Once  labetalol Injectable 10 milliGRAM(s) IV Push every 2 hours PRN  niCARdipine Infusion 5 mG/Hr IV Continuous <Continuous>        ICU Vital Signs Last 24 Hrs  T(C): 36.7 (20 Dec 2017 13:34), Max: 36.9 (20 Dec 2017 13:19)  T(F): 98 (20 Dec 2017 13:34), Max: 98.4 (20 Dec 2017 13:19)  HR: 84 (20 Dec 2017 14:51) (60 - 85)  BP: 181/90 (20 Dec 2017 16:06) (154/73 - 225/101)  BP(mean): --  ABP: --  ABP(mean): --  RR: 20 (20 Dec 2017 13:40) (16 - 20)  SpO2: 99% (20 Dec 2017 13:40) (97% - 100%)          I&O's Detail        LABS:                        9.3    10.2  )-----------( 220      ( 20 Dec 2017 13:37 )             29.2     12-20    142  |  106  |  28.0<H>  ----------------------------<  187<H>  4.7   |  21.0<L>  |  2.21<H>    Ca    10.1      20 Dec 2017 13:37    TPro  5.7<L>  /  Alb  3.2<L>  /  TBili  0.4  /  DBili  x   /  AST  14  /  ALT  10  /  AlkPhos  76  12-20      CARDIAC MARKERS ( 20 Dec 2017 13:37 )  x     / 0.02 ng/mL / 77 U/L / x     / x          CAPILLARY BLOOD GLUCOSE      POCT Blood Glucose.: 178 mg/dL (20 Dec 2017 13:17)    PT/INR - ( 20 Dec 2017 13:37 )   PT: 11.5 sec;   INR: 1.04 ratio         PTT - ( 20 Dec 2017 13:37 )  PTT:29.3 sec  Urinalysis Basic - ( 20 Dec 2017 15:05 )    Color: Yellow / Appearance: Clear / S.010 / pH: x  Gluc: x / Ketone: Negative  / Bili: Negative / Urobili: Negative mg/dL   Blood: x / Protein: 30 mg/dL / Nitrite: Negative   Leuk Esterase: Moderate / RBC: 0-2 /HPF / WBC 3-5   Sq Epi: x / Non Sq Epi: Occasional / Bacteria: x      CULTURES:      Physical Examination:    General: No acute distress.  Awake/alert, non responsive     HEENT: Pupils equal, reactive to light.  Symmetric.    PULM:  breath sounds b/l. no wheeze/rales/rhonci     CVS: +S1/S2, no  murmurs. NSR    ABD: Soft, nondistended, nontender, normoactive bowel sounds,     EXT: No edema, nontender    SKIN: Warm and well perfused, no rashes noted.    NEURO: awake/ alert, follows commands but unresponsive. 4/5 strength on right side. left side 4/5 strength, w/ left wrist drop.  No pronator drift. right sided facial drop     RADIOLOGY: < from: Xray Chest 1 View AP/PA. (17 @ 15:18) >  INTERPRETATION:  Portable chest radiograph        CLINICAL INFORMATION:   CVA. Code stroke.  Chest x-ray ordered as part of   standard stroke protocol /altered mental status workup at [facility] emergency department .      TECHNIQUE:  Portable  AP view of the chest was obtained.    COMPARISON: No previous examinations are available for review.    FINDINGS:   The lungs  are clear.  No pleural abnormality is seen.         The heart and mediastinum are within normal limits.         Visualized osseous structures are intact.        IMPRESSION:   No evidence of active chest disease.          < end of copied text >    < from: CT Head No Cont (17 @ 13:15) >  INTERPRETATION:      CLINICAL INFORMATION:   67-year-old assess for CVA     TECHNIQUE: Contiguous non contrast axial images of brain from skull base   to vertex [place] coronal reformations.   This scan was performed   using automatic exposure control (radiation dose reduction software) to   obtain a diagnostic image quality scan with patient dose as low as   reasonably achievable.      COMPARISON: No previous examinations are available for review.     FINDINGS:       There is prominence of the ventricles and cortical sulci. Midline   structures are intact. There are patchy hypodensities in periventricular   distribution consistent with chronic small vessel ischemic changes. There   is no CT evidence of acute territorial infarction.  There is no   hemorrhage, contusion or extraaxial collection. There is no acute   hydrocephalus. The visualized posterior fossa structures are intact.   There are scattered intracranial arterial calcification.  The visualized   paranasal sinuses are clear.    IMPRESSION:       No parenchymal contusion, hemorrhage or extra-axial collection.    No CT evidence of an acute territorial infarction.    If symptoms persist, and additional imaging evaluation is needed,   follow-up MRI is suggested.    < end of copied text >      CRITICAL CARE TIME SPENT: 35 min  Evaluating/treating patient, reviewing data/labs/imaging, discussing case with multidisciplinary team, discussing plan/goals of care with patient/family. Non-inclusive of procedure time.

## 2019-11-07 NOTE — PHYSICAL THERAPY INITIAL EVALUATION ADULT - SHORT TERM MEMORY, REHAB EVAL
Class I (easy) - visualization of the soft palate, fauces, uvula, and both anterior and posterior pillars
impaired

## 2020-10-06 NOTE — DISCHARGE NOTE ADULT - THE PATIENT HAS
abdominal pain and leukocytosis abdominal pain and leukocytosis abdominal pain and leukocytosis difficulty decision making

## 2020-10-30 NOTE — PROGRESS NOTE BEHAVIORAL HEALTH - MUSCLE TONE / STRENGTH
Normal muscle tone/strength
Other
Normal muscle tone/strength
No

## 2021-06-06 NOTE — PATIENT PROFILE ADULT. - AS SC BRADEN FRICTION
Tania Kaufman,    It was a pleasure to see you today at the [facility].     My recommendations after this visit include:    Stop Eliquis.  Start aspirin 81 mg daily to decrease stroke risk with A fib.    Continue metoprolol 12.5 mg (1/2 tab) twice daily    Keep a log of A fib episodes.  Call if they increase in frequency or duration.    Follow up in 3 months, or sooner if needed.    Florecita Cox, CNP  [facility], Electrophysiology  [phone]  EP nurses [phone]             (3) no apparent problem

## 2021-11-01 NOTE — ED PROVIDER NOTE - CONSTITUTIONAL NEGATIVE STATEMENT, MLM
Discharge Planning Assessment  [facility]     Patient Name: David Villalta  MRN: [id number]  Today's Date: 11/1/2021    Admit Date: 10/29/2021     Discharge Needs Assessment     Row Name 11/01/21 0829       Living Environment    Lives With alone    Current Living Arrangements homeless    Primary Care Provided by self    Provides Primary Care For no one, unable/limited ability to care for self    Family Caregiver if Needed none    Quality of Family Relationships unable to assess    Living Arrangement Comments Pt is homeless       Resource/Environmental Concerns    Resource/Environmental Concerns none    Transportation Concerns car, none       Transition Planning    Patient/Family Anticipates Transition to shelter    Patient/Family Anticipated Services at Transition none       Discharge Needs Assessment    Equipment Currently Used at Home none    Concerns to be Addressed no discharge needs identified; denies needs/concerns at this time    Anticipated Changes Related to Illness none    Equipment Needed After Discharge none               Discharge Plan     Row Name 11/01/21 0831       Plan    Plan CCP to follow.   COLEEN Vaughn RN    Patient/Family in Agreement with Plan yes    Plan Comments FACE SHEET VERIFIED.  Spoke to pt at bedside.  Pt has a guardian ( Xiang Prakash [phone]).  Called and spoke with Mr. Prakash.  Pt is homeless. Guardian states he has arranged multiple apartments and placements and pt always leaves.  Pt also loses or sells his food stamps per guardian.  Pt is independent with ADLs per guardian.  Guardian states he is working with the court system to find pt new guardian.   CCP will follow for needs.   COLEEN Vaughn RN              Continued Care and Services - Admitted Since 10/29/2021    Coordination has not been started for this encounter.          Demographic Summary     Row Name 11/01/21 0829       General Information    Admission Type inpatient    Arrived From emergency department    Referral  Source admission list    Reason for Consult discharge planning    Preferred Language English     Used During This Interaction no               Functional Status     Row Name 11/01/21 0829       Functional Status    Usual Activity Tolerance moderate    Current Activity Tolerance fair       Functional Status, IADL    Medications independent               Psychosocial    No documentation.                Abuse/Neglect    No documentation.                Legal    No documentation.                Substance Abuse    No documentation.                Patient Forms    No documentation.                   Mirtha Vaughn RN     no fever and no chills.

## 2022-04-04 NOTE — PROGRESS NOTE ADULT - PROBLEM SELECTOR PROBLEM 8
Acquired hypothyroidism
Acquired hypothyroidism
Prophylactic measure
Prophylactic measure
Acquired hypothyroidism
Acquired hypothyroidism
Prophylactic measure
Renal impairment
Renal impairment
Patient requests all Lab, Cardiology, and Radiology Results on their Discharge Instructions

## 2022-04-19 NOTE — PROGRESS NOTE BEHAVIORAL HEALTH - NSBHFUPSUICPASSIVE_PSY_A_CORE
Follow up in 3 months: clinic visit, labs, echo, and CPX. Susie patient. Thanks!
LAURA. Thanks!
none known

## 2023-07-10 NOTE — ED ADULT NURSE NOTE - PMH
Bipolar 1 disorder    HTN (hypertension)    Renal impairment    Urinary retention Complex Repair And M Plasty Text: The defect edges were debeveled with a #15 scalpel blade.  The primary defect was closed partially with a complex linear closure.  Given the location of the remaining defect, shape of the defect and the proximity to free margins an M plasty was deemed most appropriate for complete closure of the defect.  Using a sterile surgical marker, an appropriate advancement flap was drawn incorporating the defect and placing the expected incisions within the relaxed skin tension lines where possible.    The area thus outlined was incised deep to adipose tissue with a #15 scalpel blade.  The skin margins were undermined to an appropriate distance in all directions utilizing iris scissors.

## 2023-10-16 NOTE — PATIENT PROFILE ADULT. - NSCAFFEINETYPE_GEN_ALL_CORE_SD
This was a shared visit with the NAVIN. I reviewed and verified the documentation and independently performed the documented:
tea/coffee

## 2024-12-02 NOTE — ED PROVIDER NOTE - CROS ED MUSC ALL NEG
How Many Skin Cancers Have You Had?: one What Is The Reason For Today's Visit?: History of Non-Melanoma Skin Cancer Additional History: Patient has no lesions of concern. When Was Your Last Cancer Diagnosed?: 2014 negative...

## 2025-03-04 NOTE — PHYSICAL THERAPY INITIAL EVALUATION ADULT - BALANCE DISTURBANCE, IDENTIFIED IMPAIRMENT CONTRIBUTE, REHAB EVAL
Render In Strict Bullet Format?: No Detail Level: Zone Continue Regimen: Triamcinolone Discontinue Regimen: Clobetasol 0.05% Ointment impaired postural control/decreased strength